# Patient Record
Sex: FEMALE | Race: BLACK OR AFRICAN AMERICAN | NOT HISPANIC OR LATINO | Employment: OTHER | URBAN - METROPOLITAN AREA
[De-identification: names, ages, dates, MRNs, and addresses within clinical notes are randomized per-mention and may not be internally consistent; named-entity substitution may affect disease eponyms.]

---

## 2017-10-10 ENCOUNTER — HOSPITAL ENCOUNTER (OUTPATIENT)
Dept: RADIOLOGY | Facility: OTHER | Age: 57
Discharge: HOME | End: 2017-10-10
Attending: INTERNAL MEDICINE
Payer: MEDICARE

## 2017-10-10 DIAGNOSIS — R10.13 ABDOMINAL PAIN, EPIGASTRIC: ICD-10-CM

## 2017-10-10 PROCEDURE — 76700 US EXAM ABDOM COMPLETE: CPT | Mod: 26,,, | Performed by: RADIOLOGY

## 2017-10-10 PROCEDURE — 76700 US EXAM ABDOM COMPLETE: CPT | Mod: TC

## 2017-11-28 DIAGNOSIS — R10.13 ABDOMINAL PAIN, EPIGASTRIC: Primary | ICD-10-CM

## 2018-01-08 ENCOUNTER — HOSPITAL ENCOUNTER (OUTPATIENT)
Dept: RADIOLOGY | Facility: OTHER | Age: 58
Discharge: HOME | End: 2018-01-08
Attending: INTERNAL MEDICINE
Payer: MEDICARE

## 2018-01-08 DIAGNOSIS — R10.13 ABDOMINAL PAIN, EPIGASTRIC: ICD-10-CM

## 2018-01-08 PROCEDURE — 74177 CT ABD & PELVIS W/CONTRAST: CPT | Mod: TC

## 2018-01-08 PROCEDURE — 74177 CT ABD & PELVIS W/CONTRAST: CPT | Mod: 26,,, | Performed by: RADIOLOGY

## 2018-01-08 PROCEDURE — 25500020 PHARM REV CODE 255: Performed by: INTERNAL MEDICINE

## 2018-01-08 RX ADMIN — IOHEXOL 75 ML: 350 INJECTION, SOLUTION INTRAVENOUS at 04:01

## 2018-01-08 RX ADMIN — IOHEXOL 25 ML: 350 INJECTION, SOLUTION INTRAVENOUS at 03:01

## 2018-12-31 ENCOUNTER — TELEPHONE (OUTPATIENT)
Dept: GASTROENTEROLOGY | Facility: CLINIC | Age: 58
End: 2018-12-31

## 2018-12-31 NOTE — TELEPHONE ENCOUNTER
----- Message from Rose Abid sent at 12/31/2018  2:10 PM CST -----  Contact: self [phone]  Patient Requesting  Appointment.     Reason for appt.: stomach issues/ loss of appepite; Pt asked specifically for Dr. Cobb  When is the first available appointment? Unable to access  Communication Preference: self [phone]  Additional Information:

## 2018-12-31 NOTE — TELEPHONE ENCOUNTER
Spoke with patient.  Scheduled to see Dr.Sean Cobb on 4/12 for 2:30.  Will mail confirmation.  Aware she is also on 's cancellation list.

## 2020-03-03 ENCOUNTER — TELEPHONE (OUTPATIENT)
Dept: GASTROENTEROLOGY | Facility: CLINIC | Age: 60
End: 2020-03-03

## 2020-03-03 ENCOUNTER — TELEPHONE (OUTPATIENT)
Dept: ENDOSCOPY | Facility: HOSPITAL | Age: 60
End: 2020-03-03

## 2020-03-03 ENCOUNTER — HOSPITAL ENCOUNTER (OUTPATIENT)
Facility: HOSPITAL | Age: 60
Discharge: HOME | End: 2020-03-05
Attending: EMERGENCY MEDICINE | Admitting: INTERNAL MEDICINE
Payer: MEDICARE

## 2020-03-03 DIAGNOSIS — F41.9 ANXIETY: ICD-10-CM

## 2020-03-03 DIAGNOSIS — I49.1 PAC (PREMATURE ATRIAL CONTRACTION): ICD-10-CM

## 2020-03-03 DIAGNOSIS — R63.4 WEIGHT LOSS: ICD-10-CM

## 2020-03-03 DIAGNOSIS — R63.4 ABNORMAL WEIGHT LOSS: Primary | ICD-10-CM

## 2020-03-03 PROBLEM — R63.39 FOOD AVERSION: Status: ACTIVE | Noted: 2020-03-03

## 2020-03-03 PROBLEM — M32.9 SLE (SYSTEMIC LUPUS ERYTHEMATOSUS RELATED SYNDROME): Status: ACTIVE | Noted: 2020-03-03

## 2020-03-03 LAB
ALBUMIN SERPL BCP-MCNC: 3.9 G/DL (ref 3.5–5.2)
ALP SERPL-CCNC: 62 U/L (ref 55–135)
ALT SERPL W/O P-5'-P-CCNC: 12 U/L (ref 10–44)
ANION GAP SERPL CALC-SCNC: 8 MMOL/L (ref 8–16)
AST SERPL-CCNC: 21 U/L (ref 10–40)
BASOPHILS # BLD AUTO: 0.02 K/UL (ref 0–0.2)
BASOPHILS NFR BLD: 0.6 % (ref 0–1.9)
BILIRUB SERPL-MCNC: 0.9 MG/DL (ref 0.1–1)
BILIRUB UR QL STRIP: NEGATIVE
BUN SERPL-MCNC: 5 MG/DL (ref 6–20)
CALCIUM SERPL-MCNC: 9.5 MG/DL (ref 8.7–10.5)
CHLORIDE SERPL-SCNC: 102 MMOL/L (ref 95–110)
CLARITY UR REFRACT.AUTO: CLEAR
CO2 SERPL-SCNC: 29 MMOL/L (ref 23–29)
COLOR UR AUTO: YELLOW
CREAT SERPL-MCNC: 0.8 MG/DL (ref 0.5–1.4)
DIFFERENTIAL METHOD: ABNORMAL
EOSINOPHIL # BLD AUTO: 0 K/UL (ref 0–0.5)
EOSINOPHIL NFR BLD: 1.2 % (ref 0–8)
ERYTHROCYTE [DISTWIDTH] IN BLOOD BY AUTOMATED COUNT: 12.4 % (ref 11.5–14.5)
EST. GFR  (AFRICAN AMERICAN): >60 ML/MIN/1.73 M^2
EST. GFR  (NON AFRICAN AMERICAN): >60 ML/MIN/1.73 M^2
GLUCOSE SERPL-MCNC: 93 MG/DL (ref 70–110)
GLUCOSE UR QL STRIP: NEGATIVE
HCT VFR BLD AUTO: 39.1 % (ref 37–48.5)
HGB BLD-MCNC: 12.2 G/DL (ref 12–16)
HGB UR QL STRIP: NEGATIVE
IMM GRANULOCYTES # BLD AUTO: 0 K/UL (ref 0–0.04)
IMM GRANULOCYTES NFR BLD AUTO: 0 % (ref 0–0.5)
KETONES UR QL STRIP: NEGATIVE
LEUKOCYTE ESTERASE UR QL STRIP: NEGATIVE
LIPASE SERPL-CCNC: 9 U/L (ref 4–60)
LYMPHOCYTES # BLD AUTO: 1.4 K/UL (ref 1–4.8)
LYMPHOCYTES NFR BLD: 43 % (ref 18–48)
MCH RBC QN AUTO: 29.8 PG (ref 27–31)
MCHC RBC AUTO-ENTMCNC: 31.2 G/DL (ref 32–36)
MCV RBC AUTO: 95 FL (ref 82–98)
MONOCYTES # BLD AUTO: 0.3 K/UL (ref 0.3–1)
MONOCYTES NFR BLD: 8.4 % (ref 4–15)
NEUTROPHILS # BLD AUTO: 1.5 K/UL (ref 1.8–7.7)
NEUTROPHILS NFR BLD: 46.8 % (ref 38–73)
NITRITE UR QL STRIP: NEGATIVE
NRBC BLD-RTO: 0 /100 WBC
PH UR STRIP: 6 [PH] (ref 5–8)
PLATELET # BLD AUTO: 303 K/UL (ref 150–350)
PMV BLD AUTO: 10.3 FL (ref 9.2–12.9)
POTASSIUM SERPL-SCNC: 3.7 MMOL/L (ref 3.5–5.1)
PROT SERPL-MCNC: 7.2 G/DL (ref 6–8.4)
PROT UR QL STRIP: NEGATIVE
RBC # BLD AUTO: 4.1 M/UL (ref 4–5.4)
SODIUM SERPL-SCNC: 139 MMOL/L (ref 136–145)
SP GR UR STRIP: 1.01 (ref 1–1.03)
URN SPEC COLLECT METH UR: NORMAL
WBC # BLD AUTO: 3.23 K/UL (ref 3.9–12.7)

## 2020-03-03 PROCEDURE — 83690 ASSAY OF LIPASE: CPT

## 2020-03-03 PROCEDURE — 93010 ELECTROCARDIOGRAM REPORT: CPT | Mod: ,,, | Performed by: INTERNAL MEDICINE

## 2020-03-03 PROCEDURE — 96361 HYDRATE IV INFUSION ADD-ON: CPT

## 2020-03-03 PROCEDURE — 94761 N-INVAS EAR/PLS OXIMETRY MLT: CPT

## 2020-03-03 PROCEDURE — 93005 ELECTROCARDIOGRAM TRACING: CPT

## 2020-03-03 PROCEDURE — 93010 EKG 12-LEAD: ICD-10-PCS | Mod: ,,, | Performed by: INTERNAL MEDICINE

## 2020-03-03 PROCEDURE — 96374 THER/PROPH/DIAG INJ IV PUSH: CPT

## 2020-03-03 PROCEDURE — 99285 PR EMERGENCY DEPT VISIT,LEVEL V: ICD-10-PCS | Mod: ,,, | Performed by: EMERGENCY MEDICINE

## 2020-03-03 PROCEDURE — 63600175 PHARM REV CODE 636 W HCPCS: Performed by: EMERGENCY MEDICINE

## 2020-03-03 PROCEDURE — 25500020 PHARM REV CODE 255: Performed by: EMERGENCY MEDICINE

## 2020-03-03 PROCEDURE — 63600175 PHARM REV CODE 636 W HCPCS: Performed by: HOSPITALIST

## 2020-03-03 PROCEDURE — 99285 EMERGENCY DEPT VISIT HI MDM: CPT | Mod: ,,, | Performed by: EMERGENCY MEDICINE

## 2020-03-03 PROCEDURE — 96376 TX/PRO/DX INJ SAME DRUG ADON: CPT

## 2020-03-03 PROCEDURE — 25000003 PHARM REV CODE 250: Performed by: HOSPITALIST

## 2020-03-03 PROCEDURE — G0378 HOSPITAL OBSERVATION PER HR: HCPCS

## 2020-03-03 PROCEDURE — 99220 PR INITIAL OBSERVATION CARE,LEVL III: CPT | Mod: ,,, | Performed by: HOSPITALIST

## 2020-03-03 PROCEDURE — 85025 COMPLETE CBC W/AUTO DIFF WBC: CPT

## 2020-03-03 PROCEDURE — 80053 COMPREHEN METABOLIC PANEL: CPT

## 2020-03-03 PROCEDURE — 81003 URINALYSIS AUTO W/O SCOPE: CPT

## 2020-03-03 PROCEDURE — 99220 PR INITIAL OBSERVATION CARE,LEVL III: ICD-10-PCS | Mod: ,,, | Performed by: HOSPITALIST

## 2020-03-03 PROCEDURE — 99285 EMERGENCY DEPT VISIT HI MDM: CPT | Mod: 25

## 2020-03-03 RX ORDER — PANTOPRAZOLE SODIUM 40 MG/1
40 TABLET, DELAYED RELEASE ORAL DAILY
Status: DISCONTINUED | OUTPATIENT
Start: 2020-03-04 | End: 2020-03-03

## 2020-03-03 RX ORDER — IBUPROFEN 200 MG
24 TABLET ORAL
Status: DISCONTINUED | OUTPATIENT
Start: 2020-03-03 | End: 2020-03-05 | Stop reason: HOSPADM

## 2020-03-03 RX ORDER — SOTALOL HYDROCHLORIDE 80 MG/1
80 TABLET ORAL 2 TIMES DAILY
COMMUNITY

## 2020-03-03 RX ORDER — ACETAMINOPHEN 325 MG/1
650 TABLET ORAL EVERY 4 HOURS PRN
Status: DISCONTINUED | OUTPATIENT
Start: 2020-03-03 | End: 2020-03-05 | Stop reason: HOSPADM

## 2020-03-03 RX ORDER — ONDANSETRON 2 MG/ML
4 INJECTION INTRAMUSCULAR; INTRAVENOUS
Status: COMPLETED | OUTPATIENT
Start: 2020-03-03 | End: 2020-03-03

## 2020-03-03 RX ORDER — PROCHLORPERAZINE EDISYLATE 5 MG/ML
5 INJECTION INTRAMUSCULAR; INTRAVENOUS EVERY 6 HOURS PRN
Status: DISCONTINUED | OUTPATIENT
Start: 2020-03-03 | End: 2020-03-05 | Stop reason: HOSPADM

## 2020-03-03 RX ORDER — DICYCLOMINE HYDROCHLORIDE 20 MG/1
20 TABLET ORAL 2 TIMES DAILY WITH MEALS
Status: DISCONTINUED | OUTPATIENT
Start: 2020-03-04 | End: 2020-03-04

## 2020-03-03 RX ORDER — SODIUM CHLORIDE 0.9 % (FLUSH) 0.9 %
10 SYRINGE (ML) INJECTION
Status: DISCONTINUED | OUTPATIENT
Start: 2020-03-03 | End: 2020-03-05 | Stop reason: HOSPADM

## 2020-03-03 RX ORDER — TALC
6 POWDER (GRAM) TOPICAL NIGHTLY PRN
Status: DISCONTINUED | OUTPATIENT
Start: 2020-03-03 | End: 2020-03-03

## 2020-03-03 RX ORDER — ZOLPIDEM TARTRATE 10 MG/1
10 TABLET ORAL NIGHTLY PRN
COMMUNITY

## 2020-03-03 RX ORDER — IBUPROFEN 200 MG
16 TABLET ORAL
Status: DISCONTINUED | OUTPATIENT
Start: 2020-03-03 | End: 2020-03-05 | Stop reason: HOSPADM

## 2020-03-03 RX ORDER — METHOCARBAMOL 500 MG/1
500 TABLET, FILM COATED ORAL 3 TIMES DAILY PRN
COMMUNITY

## 2020-03-03 RX ORDER — HYDROXYCHLOROQUINE SULFATE 200 MG/1
200 TABLET, FILM COATED ORAL DAILY
COMMUNITY

## 2020-03-03 RX ORDER — PANTOPRAZOLE SODIUM 20 MG/1
20 TABLET, DELAYED RELEASE ORAL
COMMUNITY

## 2020-03-03 RX ORDER — LORAZEPAM 1 MG/1
1 TABLET ORAL 2 TIMES DAILY
COMMUNITY

## 2020-03-03 RX ORDER — PANTOPRAZOLE SODIUM 40 MG/1
40 TABLET, DELAYED RELEASE ORAL 2 TIMES DAILY
Status: DISCONTINUED | OUTPATIENT
Start: 2020-03-03 | End: 2020-03-05 | Stop reason: HOSPADM

## 2020-03-03 RX ORDER — ONDANSETRON 4 MG/1
4 TABLET, ORALLY DISINTEGRATING ORAL EVERY 8 HOURS PRN
COMMUNITY
End: 2020-07-30

## 2020-03-03 RX ORDER — FAMOTIDINE 20 MG/1
20 TABLET, FILM COATED ORAL DAILY
COMMUNITY
End: 2020-07-20

## 2020-03-03 RX ORDER — SODIUM CHLORIDE 9 MG/ML
1000 INJECTION, SOLUTION INTRAVENOUS
Status: COMPLETED | OUTPATIENT
Start: 2020-03-03 | End: 2020-03-03

## 2020-03-03 RX ORDER — SODIUM CHLORIDE 9 MG/ML
INJECTION, SOLUTION INTRAVENOUS CONTINUOUS
Status: ACTIVE | OUTPATIENT
Start: 2020-03-03 | End: 2020-03-04

## 2020-03-03 RX ORDER — IPRATROPIUM BROMIDE AND ALBUTEROL SULFATE 2.5; .5 MG/3ML; MG/3ML
3 SOLUTION RESPIRATORY (INHALATION) EVERY 6 HOURS PRN
Status: DISCONTINUED | OUTPATIENT
Start: 2020-03-03 | End: 2020-03-05 | Stop reason: HOSPADM

## 2020-03-03 RX ORDER — SOTALOL HYDROCHLORIDE 80 MG/1
80 TABLET ORAL 2 TIMES DAILY
Status: DISCONTINUED | OUTPATIENT
Start: 2020-03-03 | End: 2020-03-05 | Stop reason: HOSPADM

## 2020-03-03 RX ORDER — FAMOTIDINE 20 MG/1
20 TABLET, FILM COATED ORAL DAILY
Status: DISCONTINUED | OUTPATIENT
Start: 2020-03-04 | End: 2020-03-05 | Stop reason: HOSPADM

## 2020-03-03 RX ORDER — ZOLPIDEM TARTRATE 5 MG/1
5 TABLET ORAL NIGHTLY PRN
Status: DISCONTINUED | OUTPATIENT
Start: 2020-03-03 | End: 2020-03-05 | Stop reason: HOSPADM

## 2020-03-03 RX ORDER — LORAZEPAM 1 MG/1
1 TABLET ORAL EVERY 12 HOURS PRN
Status: DISCONTINUED | OUTPATIENT
Start: 2020-03-03 | End: 2020-03-05 | Stop reason: HOSPADM

## 2020-03-03 RX ORDER — DICYCLOMINE HYDROCHLORIDE 20 MG/1
20 TABLET ORAL 4 TIMES DAILY
COMMUNITY
End: 2020-07-20 | Stop reason: SDUPTHER

## 2020-03-03 RX ORDER — HYDROXYCHLOROQUINE SULFATE 200 MG/1
200 TABLET, FILM COATED ORAL 2 TIMES DAILY
Status: DISCONTINUED | OUTPATIENT
Start: 2020-03-03 | End: 2020-03-05 | Stop reason: HOSPADM

## 2020-03-03 RX ORDER — ONDANSETRON 2 MG/ML
4 INJECTION INTRAMUSCULAR; INTRAVENOUS EVERY 8 HOURS PRN
Status: DISCONTINUED | OUTPATIENT
Start: 2020-03-03 | End: 2020-03-05 | Stop reason: HOSPADM

## 2020-03-03 RX ADMIN — SODIUM CHLORIDE: 0.9 INJECTION, SOLUTION INTRAVENOUS at 10:03

## 2020-03-03 RX ADMIN — ZOLPIDEM TARTRATE 5 MG: 5 TABLET ORAL at 10:03

## 2020-03-03 RX ADMIN — IOHEXOL 75 ML: 350 INJECTION, SOLUTION INTRAVENOUS at 05:03

## 2020-03-03 RX ADMIN — SODIUM CHLORIDE 1000 ML: 0.9 INJECTION, SOLUTION INTRAVENOUS at 02:03

## 2020-03-03 RX ADMIN — HYDROXYCHLOROQUINE SULFATE 200 MG: 200 TABLET, FILM COATED ORAL at 10:03

## 2020-03-03 RX ADMIN — ONDANSETRON 4 MG: 2 INJECTION INTRAMUSCULAR; INTRAVENOUS at 04:03

## 2020-03-03 RX ADMIN — SODIUM CHLORIDE 1000 ML: 0.9 INJECTION, SOLUTION INTRAVENOUS at 04:03

## 2020-03-03 RX ADMIN — SOTALOL HYDROCHLORIDE 80 MG: 80 TABLET ORAL at 10:03

## 2020-03-03 RX ADMIN — ONDANSETRON 4 MG: 2 INJECTION INTRAMUSCULAR; INTRAVENOUS at 02:03

## 2020-03-03 RX ADMIN — PANTOPRAZOLE SODIUM 40 MG: 40 TABLET, DELAYED RELEASE ORAL at 10:03

## 2020-03-03 NOTE — H&P
"Hospital Medicine  History and Physical Exam    Team: [facility] F Derick Lee MD  Admit Date: 3/3/2020  Principal Problem:  Abnormal weight loss   Patient information was obtained from patient, past medical records and ER records.   Primary care Physician: Sebastien Yousif Jr, MD  Code status: Full Code    HPI: 60 yo F with PMHx recently diagnosed lupus and SVT presents to the ED complaining of inability to tolerate PO intake. The patient reports that the issue is chronic, and she has been evaluated by GI at [facility]. Within the last couple of days, she reports that her symptoms have gotten worse. She reports that everything she tries to take by mouth, both solids and liquids causes her stomach discomfort. She has no appetite and has to even force herself to drink sprite with her medications. She also reports that food over the last couple of days seems to have an awful taste. She has had only one episode of actual vomiting yesterday which was a small amount of clear liquid. She denies any acute diarrhea, but she endorses diarrhea that keeps in the bathroom for several hours each night. She says that around 1-2am every night like clockwork she has stomach cramps and needs to get out of bed to use the toilet, and she ends up being there for at least an hour because of diarrhea. The diarrhea seems to be worse the more food she eats. She states that everything she eats seems to come out of one end or the other within a few hours. Her diarrhea is not acutely worse. She reports that her last BM was last night around 1 am. Today, she started to feel lightheaded and suspected it was 2/2 to dehydration.    Per the patient's PCP documentation in the care everywhere tab, "Upper GI done recently with GI at [facility] with no significant finding. Previous upper GI showed ulcers which appear to have healed. She continues to follow up with them for abnormal weight loss evaluation. Also seeing rheumatology who has recently " "diagnosed patient with Lupus (~2 months ago per patient) for which she is taking Plaquenil." She states she was referred here to Dr. Cobb for GI from [facility] for further work-up of her symptoms. She has an appointment scheduled for 4/28. She called Dr. Cobb's today because of the acute worsening of her symptoms in an attempt to be seen earlier. She was instructed to go to the ED for further evaluation and possible need for IV fluids as no appointments were available for today.      No results found for: HGBA1C    Past Medical History: Patient has a past medical history of Anxiety, Arthritis, GERD (gastroesophageal reflux disease), and Neuromuscular disorder.    Past Surgical History: Patient has a past surgical history that includes left breast surgery in 1989 (Left).    Social History: Patient reports that she has never smoked. She does not have any smokeless tobacco history on file. She reports that she does not drink alcohol or use drugs.    Family History: family history includes Cancer in her mother; Heart disease in her father.    Medications: reviewed     Allergies: Patient has No Known Allergies.    ROS  Pain Scale: 0 /10   Constitutional: Positive for weakness, lack of appetite, and weight loss  Respiratory: no cough or shortness of breath  Cardiovascular: no chest pain or palpitations, Positive for lightheadedness  Gastrointestinal: Positive for N/V, and diarrhea, no abdominal pain or change in bowel habits  Genitourinary: no hematuria or dysuria  Integument/Breast: no rash or pruritis  Hematologic/Lymphatic: no easy bruising or lymphadenopathy  Musculoskeletal: no arthralgias or myalgias  Neurological: no seizures or tremors  Behavioral/Psych: no depression or anxiety    PEx  Temp:  [98.1 °F (36.7 °C)-98.5 °F (36.9 °C)]   Pulse:  [74-77]   Resp:  [15-16]   BP: (118-121)/(63-69)   SpO2:  [99 %-100 %]   Body mass index is 16.46 kg/m².     Intake/Output Summary (Last 24 hours) at 3/3/2020 " 1707  Last data filed at 3/3/2020 1534  Gross per 24 hour   Intake 1000 ml   Output --   Net 1000 ml     General appearance: no distress, pt. Resting comfortably  Mental status: Alert and oriented x 3  HEENT:  conjunctivae/corneas clear, PERRL  Neck: supple, thyroid not enlarged  Pulm:   normal respiratory effort, CTA B, no c/w/r  Card: RRR, S1, S2 normal, no murmur, click, rub or gallop  Abd: soft, NT, ND, BS present; no masses, no organomegaly  Ext: no c/c/e  Pulses: 2+, symmetric  Skin: color, texture, turgor normal. No rashes or lesions  Neuro: CN II-XII grossly intact, no focal numbness or weakness, normal strength and tone     Recent Results (from the past 24 hour(s))   CBC W/ AUTO DIFFERENTIAL    Collection Time: 03/03/20  2:14 PM   Result Value Ref Range    WBC 3.23 (L) 3.90 - 12.70 K/uL    RBC 4.10 4.00 - 5.40 M/uL    Hemoglobin 12.2 12.0 - 16.0 g/dL    Hematocrit 39.1 37.0 - 48.5 %    Mean Corpuscular Volume 95 82 - 98 fL    Mean Corpuscular Hemoglobin 29.8 27.0 - 31.0 pg    Mean Corpuscular Hemoglobin Conc 31.2 (L) 32.0 - 36.0 g/dL    RDW 12.4 11.5 - 14.5 %    Platelets 303 150 - 350 K/uL    MPV 10.3 9.2 - 12.9 fL    Immature Granulocytes 0.0 0.0 - 0.5 %    Gran # (ANC) 1.5 (L) 1.8 - 7.7 K/uL    Immature Grans (Abs) 0.00 0.00 - 0.04 K/uL    Lymph # 1.4 1.0 - 4.8 K/uL    Mono # 0.3 0.3 - 1.0 K/uL    Eos # 0.0 0.0 - 0.5 K/uL    Baso # 0.02 0.00 - 0.20 K/uL    nRBC 0 0 /100 WBC    Gran% 46.8 38.0 - 73.0 %    Lymph% 43.0 18.0 - 48.0 %    Mono% 8.4 4.0 - 15.0 %    Eosinophil% 1.2 0.0 - 8.0 %    Basophil% 0.6 0.0 - 1.9 %    Differential Method Automated    Comp. Metabolic Panel    Collection Time: 03/03/20  2:14 PM   Result Value Ref Range    Sodium 139 136 - 145 mmol/L    Potassium 3.7 3.5 - 5.1 mmol/L    Chloride 102 95 - 110 mmol/L    CO2 29 23 - 29 mmol/L    Glucose 93 70 - 110 mg/dL    BUN, Bld 5 (L) 6 - 20 mg/dL    Creatinine 0.8 0.5 - 1.4 mg/dL    Calcium 9.5 8.7 - 10.5 mg/dL    Total Protein 7.2 6.0 -  8.4 g/dL    Albumin 3.9 3.5 - 5.2 g/dL    Total Bilirubin 0.9 0.1 - 1.0 mg/dL    Alkaline Phosphatase 62 55 - 135 U/L    AST 21 10 - 40 U/L    ALT 12 10 - 44 U/L    Anion Gap 8 8 - 16 mmol/L    eGFR if African American >60.0 >60 mL/min/1.73 m^2    eGFR if non African American >60.0 >60 mL/min/1.73 m^2   Lipase    Collection Time: 03/03/20  2:14 PM   Result Value Ref Range    Lipase 9 4 - 60 U/L   Urinalysis, Reflex to Urine Culture Urine, Clean Catch    Collection Time: 03/03/20  2:37 PM   Result Value Ref Range    Specimen UA Urine, Clean Catch     Color, UA Yellow Yellow, Straw, Kaitlynn    Appearance, UA Clear Clear    pH, UA 6.0 5.0 - 8.0    Specific Gravity, UA 1.015 1.005 - 1.030    Protein, UA Negative Negative    Glucose, UA Negative Negative    Ketones, UA Negative Negative    Bilirubin (UA) Negative Negative    Occult Blood UA Negative Negative    Nitrite, UA Negative Negative    Leukocytes, UA Negative Negative       No results for input(s): POCTGLUCOSE in the last 168 hours.    Active Hospital Problems    Diagnosis  POA    Weight loss [R63.4]  Yes      Resolved Hospital Problems   No resolved problems to display.     Assessment and Plan:  Abnormal Weight Loss  Food Aversion  -Pt. Reports no appetite, not eating due to non-speicific GI symptoms. Also reports diarrhea or nausea/vomiting after she eats. Only reporting a couple of BMs per day and vomiting less than once daily  -Electrolytes and Albumin WNL, no evidence of malnutirtion  -Seen by [facility] without clear diagnosis. EGD recently performed. Was referred to GI here and has appointment 4/28  -Also reporting 30 lbs weight loss over last year, but weights documented in PCP notes show a much more chronic loss of weight  -Symptoms could possibly be manifestation of lupus, weight loss often occurs prior to the diagnosis of SLE    Wt over last several years as reported in care everywhere:  2014: 126lbs  2015: 118lbs  2016: 115lbs  2017 111 lbs  2018:  103lbs  2019: 98 lbs  2020: 90 lbs     -GI consulted, to see patient in am. Monitor in observation and treat acute symptoms of nausea symptomatically.  -Continue bentyl and PPI    SLE  -Pt.reports being diagnosed ~2 months ago after seeing derm for rash/hair loss and being referred to rheumatologist for further serum testing. Unclear if current symptoms are related  -Continue current regimen of Plaquenil, continue to f/u with rheumatology outpatient    Hx of PUD  -Per patient, diagnosed on EGD in October 2019, more recent EGDs have shown healing  -Continue PPI BID    Hx of Atrial Tachycardia  -EKG ordered, but rate normal on exam  -Continue home med sotalol    Generalized Anxiety Disorder  -Continue home med ativan PO PRN BID    DVT PPx: Lovenox    Derick Lee MD  Hospital Medicine Staff  [phone] pager

## 2020-03-03 NOTE — ED PROVIDER NOTES
"Encounter Date: 3/3/2020    SCRIBE #1 NOTE: I, Maylin Winter, am scribing for, and in the presence of,  Shonna Camarillo MD. I have scribed the following portions of the note - Other sections scribed: HPI ROS PE MDM.       History     Chief Complaint   Patient presents with    Emesis     "been going on for several months"; states she has already seen GI    Abdominal Pain     Time patient was seen by the provider: 2:07 PM      The patient is a 59 y.o. female with co-morbidities including: Lupus, GERD, who presents to the ED with a complaint of nausea and vomiting. Patient reports this has been ongoing for months and has seen here PCP for this. Patient reports yesterday she tried to eat soup and crackers but "it does not taste good". Her last episode of emesis was yesterday. Patient also reports of trying to drink ensure, smoothies, and eating spinach but after 30 minutes, she would have an episode of diarrhea. Patient reports worsening of palpitations with associated chest discomfort. She reports episode last 10 minutes in duration. She reports palpitations normally occurs in the morning. Patient reports she loss approximately 30 lbs within the last year. Endorses light headedness and decrease in appetite. Patient reports over the last couple of days she notes chills, diaphoresis, sneezing and rhinorrhea. Denies cough or fever. She reports she has been more forgetful recently.     Pt states these symptoms have been ongoing for months but she came in today because she feels as though she hasn't been able to eat anything.    The history is provided by the patient.     Review of patient's allergies indicates:  No Known Allergies  Past Medical History:   Diagnosis Date    Anxiety     Arthritis     GERD (gastroesophageal reflux disease)     Neuromuscular disorder      Past Surgical History:   Procedure Laterality Date    left breast surgery in 1989 Left      Family History   Problem Relation Age of Onset    " Cancer Mother     Heart disease Father      Social History     Tobacco Use    Smoking status: Never Smoker   Substance Use Topics    Alcohol use: No    Drug use: No     Review of Systems   Constitutional: Positive for appetite change, chills, diaphoresis and unexpected weight change. Negative for fever.   HENT: Positive for rhinorrhea and sneezing. Negative for sore throat.    Respiratory: Negative for shortness of breath.    Cardiovascular: Negative for chest pain.   Gastrointestinal: Positive for abdominal pain, diarrhea, nausea and vomiting.   Genitourinary: Negative for dysuria.   Musculoskeletal: Negative for back pain.   Skin: Negative for rash.   Neurological: Positive for light-headedness. Negative for weakness.   Hematological: Does not bruise/bleed easily.       Physical Exam     Initial Vitals [03/03/20 1340]   BP Pulse Resp Temp SpO2   118/63 77 16 98.5 °F (36.9 °C) 99 %      MAP       --         Physical Exam    Constitutional: She appears well-developed and well-nourished. No distress.   Thin appearing   HENT:   Head: Normocephalic and atraumatic.   Right Ear: External ear normal.   Left Ear: External ear normal.   Mouth/Throat: Oropharynx is clear and moist.   Eyes: EOM are normal. Pupils are equal, round, and reactive to light.   Neck: Normal range of motion. Neck supple.   Cardiovascular: Normal rate, regular rhythm, normal heart sounds and intact distal pulses.   Pulmonary/Chest: Breath sounds normal.   Abdominal: Soft. She exhibits no distension. There is tenderness.   Diffusely tender   Musculoskeletal: Normal range of motion.   Neurological: She is alert and oriented to person, place, and time. She has normal strength.   Skin: Skin is warm and dry.   Psychiatric: Her behavior is normal. Thought content normal.         ED Course   Procedures  Labs Reviewed   CBC W/ AUTO DIFFERENTIAL - Abnormal; Notable for the following components:       Result Value    WBC 3.23 (*)     Mean Corpuscular  Hemoglobin Conc 31.2 (*)     Gran # (ANC) 1.5 (*)     All other components within normal limits   COMPREHENSIVE METABOLIC PANEL - Abnormal; Notable for the following components:    BUN, Bld 5 (*)     All other components within normal limits   LIPASE   URINALYSIS, REFLEX TO URINE CULTURE    Narrative:     Preferred Collection Type->Urine, Clean Catch        ECG Results    None       Imaging Results          CT Abdomen Pelvis With Contrast (Final result)  Result time 03/03/20 18:07:00    Final result by Delmer Vanessa MD (03/03/20 18:07:00)                 Impression:      1. Mild prominence of the bilateral renal collecting systems noting distention of the urinary bladder.  Correlation with any history of urinary outlet obstruction or urinary retention recommended.  2. Moderate to large amount of stool within the colon, correlation with any clinical findings of constipation.  3. Findings suggesting hepatic steatosis, correlation with LFTs advised.  4. Additional findings above.      Electronically signed by: Delmer Vanessa MD  Date:    03/03/2020  Time:    18:07             Narrative:    EXAMINATION:  CT ABDOMEN PELVIS WITH CONTRAST    CLINICAL HISTORY:  Nausea, vomiting, diarrhea;    TECHNIQUE:  Low dose axial images, sagittal and coronal reformations were obtained from the lung bases to the pubic symphysis following the IV administration of 75 mL of Omnipaque 350 .  Oral contrast was not given.    COMPARISON:  01/08/2018    FINDINGS:  Images of the lower thorax are remarkable for minimal dependent atelectasis.    The liver is hypoattenuating, suggesting steatosis, correlation with LFTs recommended.  The spleen, pancreas, and adrenal glands are unremarkable.  The gallbladder is distended without significant biliary dilation.  No ascites.  The pancreatic duct is not dilated.  The stomach is decompressed.  The portal vein, splenic vein, proximal SMV, celiac axis and SMA all are grossly patent allowing for  contrast phase.  No significant abdominal lymphadenopathy noting several scattered shotty periaortic and paracaval lymph nodes.    The kidneys enhance symmetrically and excrete contrast appropriately without nephrolithiasis.  There is mild prominence of the bilateral renal collecting systems.  The bilateral ureters are unable to be followed in their entirety to the urinary bladder noting the ureters are mildly prominent.  The urinary bladder is distended, without wall thickening.  This may account for mild ureteral prominence although correlation with urinalysis is recommended.  The uterus and adnexa are unremarkable.    There is a large amount of stool within the colon noting a few scattered colonic diverticula without inflammation.  The terminal ileum is unremarkable.  The appendix is unremarkable.  The small bowel is unremarkable.  No focal organized pelvic fluid collection.    There is osteopenia.  Degenerative changes are noted of the spine.  No significant inguinal lymphadenopathy.                                 Medical Decision Making:   History:   Old Medical Records: I decided to obtain old medical records.  Old Records Summarized: records from clinic visits.       <> Summary of Records: Annual visit with her primary care physician at [facility] on 2/21. She has a history of Lupus and is on Plaquenil. She sees cardiology for palpitations. She had upper GI done recently at [facility] that showed no significant findings. Previous upper GI showed ulcers. Telephone note from this afternoon, where she called GI complaining of vomiting, diarrhea, unable to keep anything down.   Initial Assessment:   59 y.o. female with recent diagnosis of Lupus who presents complaining of weight loss, nausea, vomiting, and diarrhea. Reports significant decrease appetite over the past 2 days to the point she does not feel like she is eating at all. On my exam, abdominal exam is benign, will check labs. Will give a liter  of IV fluids and give IV Zofran for nausea.   Clinical Tests:   Lab Tests: Ordered and Reviewed            Scribe Attestation:   Scribe #1: I performed the above scribed service and the documentation accurately describes the services I performed. I attest to the accuracy of the note.    Attending Attestation:           Physician Attestation for Scribe:      Comments: I, Dr. Shonna Camarillo, personally performed the services described in this documentation. All medical record entries made by the scribe were at my direction and in my presence.  I have reviewed the chart and agree that the record reflects my personal performance and is accurate and complete. Shonna Camarillo MD.        Attending ED Notes:   3:57 PM  Labs are unremarkable other than mild decrease white blood cell count and a BUN of 5. Patient reports she is still nauseated. Will give another dose of Zofran and try to contact GI, Dr. Cobb.    Pt does not feel much better. Will place in observation for continued IV fluids, control of her nausea and consultation with GI.                        Clinical Impression:       ICD-10-CM ICD-9-CM   1. Weight loss R63.4 783.21   2. PAC (premature atrial contraction) I49.1 427.61   3. Abnormal weight loss R63.4 783.21             ED Disposition Condition    Observation                           Shonna Camarillo MD  03/04/20 1710

## 2020-03-03 NOTE — TELEPHONE ENCOUNTER
Patient calls in and I spoke with her.  She has no appetite and she is unable to keep anything down. No solids, no fluids. She has had numerous bouts of vomiting and diarrhea; says she weighs 82#.  She is currently taking Bentyl, Zofran, Protonix and pepcid AC @ night.  She states she is very lightheaded and weak and is experiencing heart palpitations.  She stated she feels like she needs to go to the ER for some IV fluids.  Patient is crying on the phone.  Informed her that we had no appointments today and if she felt like she needed to go to the ER because of the dehydration, weakness and heart palpations that she should do so.  She thanked me for listening.  I told her I would let Dr. Cobb know.

## 2020-03-04 PROBLEM — E43 SEVERE MALNUTRITION: Status: ACTIVE | Noted: 2020-03-04

## 2020-03-04 PROBLEM — R10.9 ABDOMINAL PAIN: Status: ACTIVE | Noted: 2020-03-04

## 2020-03-04 PROBLEM — D64.9 ANEMIA: Status: ACTIVE | Noted: 2020-03-04

## 2020-03-04 PROBLEM — R19.7 DIARRHEA: Status: ACTIVE | Noted: 2020-03-04

## 2020-03-04 PROBLEM — K21.9 GERD (GASTROESOPHAGEAL REFLUX DISEASE): Status: ACTIVE | Noted: 2020-03-04

## 2020-03-04 LAB
ANION GAP SERPL CALC-SCNC: 5 MMOL/L (ref 8–16)
BASOPHILS # BLD AUTO: 0.02 K/UL (ref 0–0.2)
BASOPHILS NFR BLD: 0.8 % (ref 0–1.9)
BUN SERPL-MCNC: 7 MG/DL (ref 6–20)
CALCIUM SERPL-MCNC: 7.8 MG/DL (ref 8.7–10.5)
CHLORIDE SERPL-SCNC: 112 MMOL/L (ref 95–110)
CO2 SERPL-SCNC: 24 MMOL/L (ref 23–29)
CREAT SERPL-MCNC: 0.8 MG/DL (ref 0.5–1.4)
CRP SERPL-MCNC: 0.5 MG/L (ref 0–8.2)
DIFFERENTIAL METHOD: ABNORMAL
EOSINOPHIL # BLD AUTO: 0.1 K/UL (ref 0–0.5)
EOSINOPHIL NFR BLD: 1.9 % (ref 0–8)
ERYTHROCYTE [DISTWIDTH] IN BLOOD BY AUTOMATED COUNT: 12.6 % (ref 11.5–14.5)
ERYTHROCYTE [SEDIMENTATION RATE] IN BLOOD BY WESTERGREN METHOD: 8 MM/HR (ref 0–36)
EST. GFR  (AFRICAN AMERICAN): >60 ML/MIN/1.73 M^2
EST. GFR  (NON AFRICAN AMERICAN): >60 ML/MIN/1.73 M^2
GLUCOSE SERPL-MCNC: 87 MG/DL (ref 70–110)
HCT VFR BLD AUTO: 29.2 % (ref 37–48.5)
HGB BLD-MCNC: 9.2 G/DL (ref 12–16)
IMM GRANULOCYTES # BLD AUTO: 0.02 K/UL (ref 0–0.04)
IMM GRANULOCYTES NFR BLD AUTO: 0.8 % (ref 0–0.5)
IRON SERPL-MCNC: 52 UG/DL (ref 30–160)
LYMPHOCYTES # BLD AUTO: 1.4 K/UL (ref 1–4.8)
LYMPHOCYTES NFR BLD: 51.3 % (ref 18–48)
MCH RBC QN AUTO: 29.7 PG (ref 27–31)
MCHC RBC AUTO-ENTMCNC: 31.5 G/DL (ref 32–36)
MCV RBC AUTO: 94 FL (ref 82–98)
MONOCYTES # BLD AUTO: 0.3 K/UL (ref 0.3–1)
MONOCYTES NFR BLD: 9.4 % (ref 4–15)
NEUTROPHILS # BLD AUTO: 1 K/UL (ref 1.8–7.7)
NEUTROPHILS NFR BLD: 35.8 % (ref 38–73)
NRBC BLD-RTO: 0 /100 WBC
PLATELET # BLD AUTO: 209 K/UL (ref 150–350)
PMV BLD AUTO: 10.8 FL (ref 9.2–12.9)
POTASSIUM SERPL-SCNC: 3.7 MMOL/L (ref 3.5–5.1)
RBC # BLD AUTO: 3.1 M/UL (ref 4–5.4)
SATURATED IRON: 18 % (ref 20–50)
SODIUM SERPL-SCNC: 141 MMOL/L (ref 136–145)
T4 FREE SERPL-MCNC: 0.96 NG/DL (ref 0.71–1.51)
TOTAL IRON BINDING CAPACITY: 292 UG/DL (ref 250–450)
TRANSFERRIN SERPL-MCNC: 197 MG/DL (ref 200–375)
TSH SERPL DL<=0.005 MIU/L-ACNC: 1.35 UIU/ML (ref 0.4–4)
WBC # BLD AUTO: 2.65 K/UL (ref 3.9–12.7)

## 2020-03-04 PROCEDURE — 80048 BASIC METABOLIC PNL TOTAL CA: CPT

## 2020-03-04 PROCEDURE — 85652 RBC SED RATE AUTOMATED: CPT

## 2020-03-04 PROCEDURE — 84443 ASSAY THYROID STIM HORMONE: CPT

## 2020-03-04 PROCEDURE — 83540 ASSAY OF IRON: CPT

## 2020-03-04 PROCEDURE — 25000003 PHARM REV CODE 250: Performed by: HOSPITALIST

## 2020-03-04 PROCEDURE — 86677 HELICOBACTER PYLORI ANTIBODY: CPT

## 2020-03-04 PROCEDURE — 97802 MEDICAL NUTRITION INDIV IN: CPT

## 2020-03-04 PROCEDURE — 84439 ASSAY OF FREE THYROXINE: CPT

## 2020-03-04 PROCEDURE — 85025 COMPLETE CBC W/AUTO DIFF WBC: CPT

## 2020-03-04 PROCEDURE — 99226 PR SUBSEQUENT OBSERVATION CARE,LEVEL III: ICD-10-PCS | Mod: ,,, | Performed by: PHYSICIAN ASSISTANT

## 2020-03-04 PROCEDURE — 25000003 PHARM REV CODE 250: Performed by: PHYSICIAN ASSISTANT

## 2020-03-04 PROCEDURE — 86140 C-REACTIVE PROTEIN: CPT

## 2020-03-04 PROCEDURE — G0378 HOSPITAL OBSERVATION PER HR: HCPCS

## 2020-03-04 PROCEDURE — 83516 IMMUNOASSAY NONANTIBODY: CPT | Mod: 59

## 2020-03-04 PROCEDURE — 94761 N-INVAS EAR/PLS OXIMETRY MLT: CPT

## 2020-03-04 PROCEDURE — 99204 PR OFFICE/OUTPT VISIT, NEW, LEVL IV, 45-59 MIN: ICD-10-PCS | Mod: ,,, | Performed by: INTERNAL MEDICINE

## 2020-03-04 PROCEDURE — 99226 PR SUBSEQUENT OBSERVATION CARE,LEVEL III: CPT | Mod: ,,, | Performed by: PHYSICIAN ASSISTANT

## 2020-03-04 PROCEDURE — 36415 COLL VENOUS BLD VENIPUNCTURE: CPT

## 2020-03-04 PROCEDURE — 99204 OFFICE O/P NEW MOD 45 MIN: CPT | Mod: ,,, | Performed by: INTERNAL MEDICINE

## 2020-03-04 PROCEDURE — 63600175 PHARM REV CODE 636 W HCPCS: Performed by: HOSPITALIST

## 2020-03-04 PROCEDURE — 96376 TX/PRO/DX INJ SAME DRUG ADON: CPT

## 2020-03-04 RX ORDER — LOPERAMIDE HYDROCHLORIDE 2 MG/1
2 CAPSULE ORAL 4 TIMES DAILY PRN
Status: DISCONTINUED | OUTPATIENT
Start: 2020-03-04 | End: 2020-03-05 | Stop reason: HOSPADM

## 2020-03-04 RX ORDER — DICYCLOMINE HYDROCHLORIDE 20 MG/1
20 TABLET ORAL
Status: DISCONTINUED | OUTPATIENT
Start: 2020-03-04 | End: 2020-03-04

## 2020-03-04 RX ORDER — DICYCLOMINE HYDROCHLORIDE 20 MG/1
20 TABLET ORAL
Status: DISPENSED | OUTPATIENT
Start: 2020-03-04 | End: 2020-03-05

## 2020-03-04 RX ADMIN — DICYCLOMINE HYDROCHLORIDE 20 MG: 20 TABLET ORAL at 09:03

## 2020-03-04 RX ADMIN — DICYCLOMINE HYDROCHLORIDE 20 MG: 20 TABLET ORAL at 10:03

## 2020-03-04 RX ADMIN — HYDROXYCHLOROQUINE SULFATE 200 MG: 200 TABLET, FILM COATED ORAL at 09:03

## 2020-03-04 RX ADMIN — ONDANSETRON 4 MG: 2 INJECTION INTRAMUSCULAR; INTRAVENOUS at 07:03

## 2020-03-04 RX ADMIN — ACETAMINOPHEN 650 MG: 325 TABLET ORAL at 02:03

## 2020-03-04 RX ADMIN — LOPERAMIDE HYDROCHLORIDE 2 MG: 2 CAPSULE ORAL at 01:03

## 2020-03-04 RX ADMIN — SOTALOL HYDROCHLORIDE 80 MG: 80 TABLET ORAL at 09:03

## 2020-03-04 RX ADMIN — PANTOPRAZOLE SODIUM 40 MG: 40 TABLET, DELAYED RELEASE ORAL at 09:03

## 2020-03-04 RX ADMIN — ZOLPIDEM TARTRATE 5 MG: 5 TABLET ORAL at 09:03

## 2020-03-04 RX ADMIN — SOTALOL HYDROCHLORIDE 80 MG: 80 TABLET ORAL at 10:03

## 2020-03-04 RX ADMIN — HYDROXYCHLOROQUINE SULFATE 200 MG: 200 TABLET, FILM COATED ORAL at 10:03

## 2020-03-04 RX ADMIN — LORAZEPAM 1 MG: 1 TABLET ORAL at 02:03

## 2020-03-04 RX ADMIN — LOPERAMIDE HYDROCHLORIDE 2 MG: 2 CAPSULE ORAL at 02:03

## 2020-03-04 RX ADMIN — FAMOTIDINE 20 MG: 20 TABLET, FILM COATED ORAL at 01:03

## 2020-03-04 NOTE — PLAN OF CARE
Pt admitted and care plan initiated.pt reports a 30lb unintentional weight loss over the past year.reports decreased appetite and diff swallowing solids.states she gets nauseated and vomits every night at 2;30am.no n/v noted tonight.ivf in progress.awaiting GI consult in am.reports adequate pain relief.pt free of falls or injuries.safety precautions maintained.continue plan of care.

## 2020-03-04 NOTE — CONSULTS
"  [facility]  Adult Nutrition  Consult Note    SUMMARY     Recommendations    Pt meets severe malnutrition criteria.     1. Continue regular diet as tolerated.     2. Add Boost Plus with meals to aid in caloric intake.     3. If TF warranted, recommend TF of Isosource 1.5 advancing as tolerated to goal rate of 45 mL/hr to provide 1620 kcal, 73 gm protein, and 825 mL free fluid.     4. If TPN warranted, recommend Custom TPN 82 gm AA / 250 gm Dex + IV lipids to provide 1619 kcal, 82 gm protein, and GIR 4.26.     5. RD following.     Goals: meet >50% EEN/EPN by RD follow-up  Nutrition Goal Status: new  Communication of RD Recs: (POC)    Reason for Assessment    Reason For Assessment: consult  Diagnosis: (Abnormal weight loss)  Relevant Medical History:  recently diagnosed lupus, SVT   Interdisciplinary Rounds: did not attend  General Information Comments: Pt resting in bed with no family members at bedside. Pt reports no appetite and agreed to try Boost Plus. Per chart review, pt eating 25% of meals. C/o nausea. Pt states that appetite was been decreased/worsening for 1 year PTA. Experiences N/V/D almost daily. UBW 128lbs 1 year ago. NFPE completed. Pt with mild-severe muscle/fat wasting. Pt reports being very small compared to normal. Pt meets severe malnutrition criteria.   Nutrition Discharge Planning: adequate po intake    Nutrition Risk Screen    Nutrition Risk Screen: dysphagia or difficulty swallowing    Nutrition/Diet History    Spiritual, Cultural Beliefs, Adventism Practices, Values that Affect Care: no  Factors Affecting Nutritional Intake: abdominal pain, decreased appetite, nausea/vomiting    Anthropometrics    Temp: 97.5 °F (36.4 °C)  Height Method: Stated  Height: 5' 2" (157.5 cm)  Height (inches): 62 in  Weight Method: Standard Scale  Weight: 40.7 kg (89 lb 11.6 oz)  Weight (lb): 89.73 lb  Ideal Body Weight (IBW), Female: 110 lb  % Ideal Body Weight, Female (lb): 81.57 %  BMI " (Calculated): 16.4  BMI Grade: 16 - 16.9 protein-energy malnutrition grade II  Weight Loss: unintentional  Usual Body Weight (UBW), k.18 kg(UBW x 1 year per pt)  % Usual Body Weight: 70.1  % Weight Change From Usual Weight: -30.05 %     Lab/Procedures/Meds    Pertinent Labs Reviewed: reviewed  Pertinent Medications Reviewed: reviewed  Pertinent Medications Comments: famotidine, pantprazole, sotalol, IVF    Estimated/Assessed Needs    Weight Used For Calorie Calculations: 40.7 kg (89 lb 11.6 oz)  Energy Calorie Requirements (kcal): 1628 kcal/day  Energy Need Method: Kcal/kg(40)  Protein Requirements: 61-82 gm/day(1.5-2.0 gm/kg)  Weight Used For Protein Calculations: 40.7 kg (89 lb 11.6 oz)  Fluid Requirements (mL): 1 mL/kcal or per MD     RDA Method (mL): 1628     Nutrition Prescription Ordered    Current Diet Order: Regular    Evaluation of Received Nutrient/Fluid Intake    I/O: +2.54L since admit  Comments: LBM 3/2  Tolerance: tolerating  % Intake of Estimated Energy Needs: 0 - 25 %  % Meal Intake: 0 - 25 %    Nutrition Risk    Level of Risk/Frequency of Follow-up: (f/u 1 x wk)     Assessment and Plan    Severe malnutrition  Nutrition Problem:  Severe Protein-Calorie Malnutrition  Malnutrition in the context of Chronic Illness/Injury    Related to (etiology):  Inadequate Energy Intake 2/2 abdominal pain, nausea, vomiting, diarrhea    Signs and Symptoms (as evidenced by):  Energy Intake: <75% of estimated energy requirement for > 1 year; <50% of estimated energy requirement for > 1 month  Body Fat Depletion: moderate depletion of triceps   Muscle Mass Depletion: mild, moderate and severe depletion of temples, clavicle region and lower extremities   Weight Loss: 30% x 1 year per pt     Interventions (treatment strategy):  Collaboration of nutrition care with other providers    Nutrition Diagnosis Status:  New             Monitor and Evaluation    Food and Nutrient Intake: energy intake, food and beverage  intake  Food and Nutrient Adminstration: diet order  Physical Activity and Function: nutrition-related ADLs and IADLs  Anthropometric Measurements: weight, weight change, body mass index  Biochemical Data, Medical Tests and Procedures: electrolyte and renal panel, gastrointestinal profile, glucose/endocrine profile, inflammatory profile, lipid profile  Nutrition-Focused Physical Findings: overall appearance     Malnutrition Assessment  Malnutrition Type: chronic illness          Weight Loss (Malnutrition): (30% x 1 year per pt)  Energy Intake (Malnutrition): less than or equal to 50% for greater than or equal to 1 month(less than 75% for greater than 1 year)   Upper Arm Region (Subcutaneous Fat Loss): moderate depletion   [place] Region (Muscle Loss): moderate depletion  Clavicle Bone Region (Muscle Loss): severe depletion  Clavicle and Acromion Bone Region (Muscle Loss): severe depletion  Anterior Thigh Region (Muscle Loss): mild depletion  Posterior Calf Region (Muscle Loss): well nourished                 Nutrition Follow-Up    RD Follow-up?: Yes

## 2020-03-04 NOTE — PROGRESS NOTES
"[facility] Hospital Medicine  Progress Note    Patient Name: Luciana Berger  MRN: [id number]  Patient Class: OP- Observation   Admission Date: 3/3/2020  Length of Stay: 0 days  Attending Physician: JONATHON Huggins MD  Primary Care Provider: Sebastien Yousif Jr, MD    [facility] Medicine Team: [facility] MED F Edilia Alonso PA-C    Subjective:     Principal Problem:Abnormal weight loss        HPI:  58 yo F with PMHx recently diagnosed lupus and SVT presents to the ED complaining of inability to tolerate PO intake. The patient reports that the issue is chronic, and she has been evaluated by GI at [facility]. Within the last couple of days, she reports that her symptoms have gotten worse. She reports that everything she tries to take by mouth, both solids and liquids causes her stomach discomfort. She has no appetite and has to even force herself to drink sprite with her medications. She also reports that food over the last couple of days seems to have an awful taste. She has had only one episode of actual vomiting yesterday which was a small amount of clear liquid. She denies any acute diarrhea, but she endorses diarrhea that keeps in the bathroom for several hours each night. She says that around 1-2am every night like clockwork she has stomach cramps and needs to get out of bed to use the toilet, and she ends up being there for at least an hour because of diarrhea. The diarrhea seems to be worse the more food she eats. She states that everything she eats seems to come out of one end or the other within a few hours. Her diarrhea is not acutely worse. She reports that her last BM was last night around 1 am. Today, she started to feel lightheaded and suspected it was 2/2 to dehydration.     Per the patient's PCP documentation in the care everywhere tab, "Upper GI done recently with GI at [facility] with no significant finding. Previous upper GI showed ulcers which appear to have healed. She continues " "to follow up with them for abnormal weight loss evaluation. Also seeing rheumatology who has recently diagnosed patient with Lupus (~2 months ago per patient) for which she is taking Plaquenil." She states she was referred here to Dr. Cobb for GI from [facility] for further work-up of her symptoms. She has an appointment scheduled for 4/28. She called Dr. Cobb's today because of the acute worsening of her symptoms in an attempt to be seen earlier. She was instructed to go to the ED for further evaluation and possible need for IV fluids as no appointments were available for today.    Overview/Hospital Course:  Mrs. Berger was admitted to observation for abnormal weight loss. GI consulted, recommend gastric emptying study, ordered. Hgb 12.2-->9.2, anemia work up pending though could be dilutional as patient given 2L IVF and started on mIVF.  No active signs of bleeding. TSH WNL. Celiac panel pending. Suspect discharge tomorrow after gastric emptying study.    Interval History: Patient reports generalized abdominal cramping, N/V, and mild frequency and urgency. Multiple episodes of diarrhea. On mIVF. Hgb dropped 12-->9, likely diliutional but will get anemia work up. Celiac panel pending. TSH WNL. GI evaluated, recommend gastric emptying study for gastroparesis. Nutrition consulted, recommend boost supplements. Vitamin D pending. UA negative.     Review of Systems   Constitutional: Positive for activity change, appetite change and fatigue. Negative for chills and fever.   HENT: Negative for congestion and rhinorrhea.    Respiratory: Negative for cough and shortness of breath.    Cardiovascular: Negative for chest pain and leg swelling.   Gastrointestinal: Positive for abdominal pain, diarrhea, nausea and vomiting. Negative for abdominal distention, blood in stool and constipation.   Genitourinary: Positive for frequency and urgency. Negative for difficulty urinating, dysuria, flank pain and hematuria. "   Musculoskeletal: Negative for arthralgias and back pain.   Skin: Negative for color change and pallor.   Neurological: Positive for weakness. Negative for numbness and headaches.   Psychiatric/Behavioral: Negative for agitation and behavioral problems.     Objective:     Vital Signs (Most Recent):  Temp: 98 °F (36.7 °C) (03/04/20 1240)  Pulse: 70 (03/04/20 1240)  Resp: 17 (03/04/20 1240)  BP: 137/80 (03/04/20 1240)  SpO2: 95 % (03/04/20 1240) Vital Signs (24h Range):  Temp:  [96.7 °F (35.9 °C)-98.5 °F (36.9 °C)] 98 °F (36.7 °C)  Pulse:  [69-80] 70  Resp:  [15-18] 17  SpO2:  [95 %-100 %] 95 %  BP: ()/(52-80) 137/80     Weight: 40.7 kg (89 lb 11.6 oz)  Body mass index is 16.41 kg/m².    Intake/Output Summary (Last 24 hours) at 3/4/2020 1320  Last data filed at 3/4/2020 0600  Gross per 24 hour   Intake 3040 ml   Output 500 ml   Net 2540 ml      Physical Exam   Constitutional: She is oriented to person, place, and time. She appears well-developed and well-nourished.   Pleasant, thin appearing female   HENT:   Head: Normocephalic and atraumatic.   Eyes: Pupils are equal, round, and reactive to light. EOM are normal.   Neck: Normal range of motion. Neck supple.   Cardiovascular: Normal rate and regular rhythm.   Pulmonary/Chest: Effort normal and breath sounds normal.   Abdominal: Soft. Bowel sounds are normal. There is no tenderness. There is no guarding.   Hyperactive bowel sounds   Musculoskeletal: Normal range of motion. She exhibits no edema.   Neurological: She is alert and oriented to person, place, and time.   Skin: Skin is warm and dry. Capillary refill takes less than 2 seconds.   Psychiatric: She has a normal mood and affect. Her behavior is normal.   Nursing note and vitals reviewed.      Significant Labs:   Bilirubin:   Recent Labs   Lab 03/03/20  1414   BILITOT 0.9     CBC:   Recent Labs   Lab 03/03/20  1414 03/04/20  0452   WBC 3.23* 2.65*   HGB 12.2 9.2*   HCT 39.1 29.2*    209     CMP:    Recent Labs   Lab 03/03/20  1414 03/04/20  0452    141   K 3.7 3.7    112*   CO2 29 24   GLU 93 87   BUN 5* 7   CREATININE 0.8 0.8   CALCIUM 9.5 7.8*   PROT 7.2  --    ALBUMIN 3.9  --    BILITOT 0.9  --    ALKPHOS 62  --    AST 21  --    ALT 12  --    ANIONGAP 8 5*   EGFRNONAA >60.0 >60.0     Lipase:   Recent Labs   Lab 03/03/20  1414   LIPASE 9     Magnesium: No results for input(s): MG in the last 48 hours.  Troponin: No results for input(s): TROPONINI in the last 48 hours.  Urine Studies:   Recent Labs   Lab 03/03/20  1437   COLORU Yellow   APPEARANCEUA Clear   PHUR 6.0   SPECGRAV 1.015   PROTEINUA Negative   GLUCUA Negative   KETONESU Negative   BILIRUBINUA Negative   OCCULTUA Negative   NITRITE Negative   LEUKOCYTESUR Negative       Significant Imaging: I have reviewed all pertinent imaging results/findings within the past 24 hours.      Assessment/Plan:      * Abnormal weight loss  Abdominal pain  Diarrhea  Anxiety  Severe malnutrition  Patient presents with chronic abdominal cramping and diarrhea, nausea, bloating, decreased appetite, and weight loss. She reports abdominal cramping 30 minutes after meals with diarrhea. No melena/ hematochezia. Has had a few episodes of vomiting food.  -Seen by [facility] without clear diagnosis. EGD recently performed. Was referred to GI here and has appointment 4/28  -Reporting 30 lbs weight loss over last year, but weights documented in PCP notes show a much more chronic loss of weight  -Symptoms could possibly be manifestation of lupus, weight loss often occurs prior to the diagnosis of SLE  - No bloody bowel movements, lower concern for ischemia  - CT with stool burden on admit, but currently having diarrhea  - GI consulted, appreciate recs  - suspect IBS with anxiety, but concern for delayed gastric emptying.  - celiac labs pending  - vitamin D pending  - gastric emptying study pending, will hold bentyl at midnight for study  - dietary consulted, recommend  boost plus with meals for   - suspect discharge in am after gastric emptying study  - will need follow up with GI     Wt over last several years as reported in care everywhere:  2014: 126lbs  2015: 118lbs  2016: 115lbs  2017 111 lbs  2018: 103lbs  2019: 98 lbs  2020: 90 lbs      -GI consulted, to see patient in am. Monitor in observation and treat acute symptoms of nausea symptomatically.  -Continue bentyl and PPI      Anemia  - Hgb 12--> 9.2  - anemia labs pending  - suspect dilutional in setting of >2L IVF  - no evidence of bleeding  - continue to monitor    GERD (gastroesophageal reflux disease)  - continue protonix daily    SLE (systemic lupus erythematosus related syndrome)  -Pt.reports being diagnosed ~2 months ago after seeing derm for rash/hair loss and being referred to rheumatologist for further serum testing. Unclear if current symptoms are related  -Continue current regimen of Plaquenil, continue to f/u with rheumatology outpatient      VTE Risk Mitigation (From admission, onward)         Ordered     IP VTE LOW RISK PATIENT  Once      03/03/20 1631     Place REX hose  Until discontinued      03/03/20 1631     Place sequential compression device  Until discontinued      03/03/20 1631                      Edilia Alonso PA-C  Department of Hospital Medicine   [facility]

## 2020-03-04 NOTE — NURSING TRANSFER
Nursing Transfer Note    Pt arrived from the er via stretcher accompanied by transporter and .aaox4.resp even and non labored.vss.safety precautions implemented.call bell in reach.will monitor.

## 2020-03-04 NOTE — PLAN OF CARE
Problem: Oral Intake Inadequate  Goal: Improved Oral Intake  Outcome: Ongoing, Progressing     Problem: Malnutrition  Goal: Improved Nutritional Intake  Outcome: Ongoing, Progressing       Recommendations    Pt meets severe malnutrition criteria.     1. Continue regular diet as tolerated.     2. Add Boost Plus with meals to aid in caloric intake.     3. If TF warranted, recommend TF of Isosource 1.5 advancing as tolerated to goal rate of 45 mL/hr to provide 1620 kcal, 73 gm protein, and 825 mL free fluid.     4. If TPN warranted, recommend Custom TPN 82 gm AA / 250 gm Dex + IV lipids to provide 1619 kcal, 82 gm protein, and GIR 4.26.     5. RD following.     Goals: meet >50% EEN/EPN by RD follow-up  Nutrition Goal Status: new

## 2020-03-04 NOTE — ASSESSMENT & PLAN NOTE
-Pt.reports being diagnosed ~2 months ago after seeing derm for rash/hair loss and being referred to rheumatologist for further serum testing. Unclear if current symptoms are related  -Continue current regimen of Plaquenil, continue to f/u with rheumatology outpatient

## 2020-03-04 NOTE — ASSESSMENT & PLAN NOTE
Nutrition Problem:  Severe Protein-Calorie Malnutrition  Malnutrition in the context of Chronic Illness/Injury    Related to (etiology):  Inadequate Energy Intake 2/2 abdominal pain, nausea, vomiting, diarrhea    Signs and Symptoms (as evidenced by):  Energy Intake: <75% of estimated energy requirement for > 1 year; <50% of estimated energy requirement for > 1 month  Body Fat Depletion: moderate depletion of triceps   Muscle Mass Depletion: mild, moderate and severe depletion of temples, clavicle region and lower extremities   Weight Loss: 30% x 1 year per pt     Interventions (treatment strategy):  Collaboration of nutrition care with other providers    Nutrition Diagnosis Status:  New

## 2020-03-04 NOTE — CONSULTS
[facility]  Gastroenterology Service  Consult Note    Patient Name: Luciana Berger  MRN: [id number]  Admission Date: 3/3/2020  Hospital Length of Stay: 0 days  Code Status: Full Code   Attending Provider: JONATHON Huggins MD   Consulting Provider: Moises Onofre MD  Primary Care Physician: Sebastien Yousif Jr, MD  Principal Problem:Abnormal weight loss    Inpatient consult to Gastroenterology  Consult performed by: Moises Onofre MD  Consult ordered by: Derick Lee MD        Subjective:     HPI: Luciana Berger is a 59 y.o. female with history of SLE (on plaquenil) who presents with vomiting, diarrhea and weight loss.    Patient reports about five years of GERD and 3-4 years of dysphagia (previous dilations), nausea, vomiting, abdominal pain, and diarrhea.  She feels like food takes longer to get into her stomach.  After a few hours, she starts having nausea and vomiting.  Typically wakes up from sleep around 1-2AM with vomiting and sometimes diarrhea.  Try cutting out milk products, but no relief.  She has not found any specific food triggers.  Has an abdominal cramping in mid and lower abdomen which is relieved with Bentyl.  She notes a history of H pylori which was treated.  Most recently had EGD last month with GI at [facility] - previously seen ulcers had healed, and otherwise unremarkable. Last colonoscopy 4 years ago for screening unremarkable.    Most recently, patient notes worsening heartburn for the last 2 weeks.  She also notes intermittent dysphagia of but nothing that is changed from her baseline.  There is no change in her diarrhea or frequency of vomiting.  No overt GI bleeding.  She became more concerned about her symptoms and weight loss (40# in last two years) so presented to the ED.    She takes Percocet as needed for neck and back pain. She is also on Ativan and Robaxin.  No prior abdominal surgeries.  Denies smoking alcohol or illicit drug use.  No family history of GI  malignancies or IBD.  Mother had lymphoma.    CT a/p here with steatosis and stool throughout colon.        Past Medical History:   Diagnosis Date    Anxiety     Arthritis     GERD (gastroesophageal reflux disease)     Neuromuscular disorder        Past Surgical History:   Procedure Laterality Date    left breast surgery in 1989 Left        Family History   Problem Relation Age of Onset    Cancer Mother     Heart disease Father        Social History     Socioeconomic History    Marital status:      Spouse name: Not on file    Number of children: Not on file    Years of education: Not on file    Highest education level: Not on file   Occupational History    Not on file   Social Needs    Financial resource strain: Not on file    Food insecurity:     Worry: Not on file     Inability: Not on file    Transportation needs:     Medical: Not on file     Non-medical: Not on file   Tobacco Use    Smoking status: Never Smoker   Substance and Sexual Activity    Alcohol use: No    Drug use: No    Sexual activity: Not on file   Lifestyle    Physical activity:     Days per week: Not on file     Minutes per session: Not on file    Stress: Not on file   Relationships    Social connections:     Talks on phone: Not on file     Gets together: Not on file     Attends [employer] service: Not on file     Active member of club or organization: Not on file     Attends meetings of clubs or organizations: Not on file     Relationship status: Not on file   Other Topics Concern    Not on file   Social History Narrative    Not on file       No current facility-administered medications on file prior to encounter.      Current Outpatient Medications on File Prior to Encounter   Medication Sig Dispense Refill    dicyclomine (BENTYL) 20 mg tablet Take 20 mg by mouth 2 (two) times daily.      famotidine (PEPCID) 20 MG tablet Take 20 mg by mouth once daily.       hydroxychloroquine (PLAQUENIL) 200 mg tablet Take 200  mg by mouth once daily.      LORazepam (ATIVAN) 1 MG tablet Take 1 mg by mouth 2 (two) times daily.      methocarbamol (ROBAXIN) 500 MG Tab Take 500 mg by mouth 3 (three) times daily as needed.      ondansetron (ZOFRAN-ODT) 4 MG TbDL Take 4 mg by mouth every 8 (eight) hours as needed.      oxycodone-acetaminophen (PERCOCET) 5-325 mg per tablet Take 1 tablet by mouth every 4 (four) hours as needed for Pain.      pantoprazole (PROTONIX) 20 MG tablet Take 20 mg by mouth 2 (two) times daily before meals.      sotalol (BETAPACE) 80 MG tablet Take 80 mg by mouth 2 (two) times daily.      zolpidem (AMBIEN) 10 mg Tab Take 10 mg by mouth nightly as needed.      esomeprazole (NEXIUM) 20 mg GrPS Take 20 mg by mouth before breakfast.         Review of patient's allergies indicates:  No Known Allergies    Review of Systems:   Constitutional: no fever, chills  Eyes: no visual changes   ENT: no sore throat  Respiratory: no cough or shortness of breath   Cardiovascular: no chest pain or palpitations   Gastrointestinal: as per HPI  Hematologic/Lymphatic: no easy bruising or lymphadenopathy   Musculoskeletal: no arthralgias or myalgias   Neurological: no change in mental status  Behavioral/Psych: no change in mood    Objective:     Vitals:    03/04/20 0438   BP: (!) 98/52   Pulse: 75   Resp: 18   Temp: 97.8 °F (36.6 °C)       General: Alert and Oriented, no distress. Thin  HEENT: Normocephalic, Atraumatic. No scleral icterus.  Resp: Good air entry bilaterally, no adventitious sounds  Cardiac: S1 and S2 normal  Abdomen: Normoactive bowel sounds. Non-distended. Normal tympany. Soft. Non-tender. No peritoneal signs.  Extremities: No peripheral edema.   Neurologic: No gross neurological Deficits  Psych: Calm, cooperative. Normal mood and affect.    Significant Labs:  Recent Labs   Lab 03/03/20  1414   HGB 12.2       Lab Results   Component Value Date    WBC 3.23 (L) 03/03/2020    HGB 12.2 03/03/2020    HCT 39.1 03/03/2020    MCV  95 03/03/2020     03/03/2020       Lab Results   Component Value Date     03/03/2020    K 3.7 03/03/2020     03/03/2020    CO2 29 03/03/2020    BUN 5 (L) 03/03/2020    CREATININE 0.8 03/03/2020    CALCIUM 9.5 03/03/2020    ANIONGAP 8 03/03/2020    ESTGFRAFRICA >60.0 03/03/2020    EGFRNONAA >60.0 03/03/2020       Lab Results   Component Value Date    ALT 12 03/03/2020    AST 21 03/03/2020    ALKPHOS 62 03/03/2020    BILITOT 0.9 03/03/2020       No results found for: INR    Significant Imaging:  Reviewed pertinent radiology findings.       Assessment/Plan:     Luciana Berger is a 59 y.o. female with history of SLE (on plaquenil) who presents with vomiting, diarrhea and weight loss.    Symptoms are chronic, ongoing for years. Suspect IBS with anxiety. Vomiting occurs hours after eating. Possible delayed gastric emptying. Usually more immediate vomiting would suggest psychogenic component or rumination. Abdominal pain appears to be responding well to bentyl. Regarding diarrhea, patient actually has stool burden on CT. No use in sending stool studies for chronicity.    Problem List:  1. IBS  2. Vomiting  3. Chronic diarrhea  4. Weight loss    Plan:  1. Recommend gastric emptying study (ideally off narcotics or any motility agents). Can be done inpatient or outpatient.  2. No need to repeat endoscopy which was just recently done. Would be helpful to get OSH records from [facility]  3. Continue bentyl prn  4. Will follow up in GI clinic    Thank you for involving us in the care of Luciana Berger. Please call with any additional questions, concerns or changes in the patient's clinical status.    Moises Onofre MD  Gastroenterology Fellow PGY IV   [facility]

## 2020-03-04 NOTE — SUBJECTIVE & OBJECTIVE
Interval History: Patient reports generalized abdominal cramping, N/V, and mild frequency and urgency. Multiple episodes of diarrhea. On mIVF. Hgb dropped 12-->9, likely diliutional but will get anemia work up. Celiac panel pending. TSH WNL. GI evaluated, recommend gastric emptying study for gastroparesis. Nutrition consulted, recommend boost supplements. Vitamin D pending. UA negative.     Review of Systems   Constitutional: Positive for activity change, appetite change and fatigue. Negative for chills and fever.   HENT: Negative for congestion and rhinorrhea.    Respiratory: Negative for cough and shortness of breath.    Cardiovascular: Negative for chest pain and leg swelling.   Gastrointestinal: Positive for abdominal pain, diarrhea, nausea and vomiting. Negative for abdominal distention, blood in stool and constipation.   Genitourinary: Positive for frequency and urgency. Negative for difficulty urinating, dysuria, flank pain and hematuria.   Musculoskeletal: Negative for arthralgias and back pain.   Skin: Negative for color change and pallor.   Neurological: Positive for weakness. Negative for numbness and headaches.   Psychiatric/Behavioral: Negative for agitation and behavioral problems.     Objective:     Vital Signs (Most Recent):  Temp: 98 °F (36.7 °C) (03/04/20 1240)  Pulse: 70 (03/04/20 1240)  Resp: 17 (03/04/20 1240)  BP: 137/80 (03/04/20 1240)  SpO2: 95 % (03/04/20 1240) Vital Signs (24h Range):  Temp:  [96.7 °F (35.9 °C)-98.5 °F (36.9 °C)] 98 °F (36.7 °C)  Pulse:  [69-80] 70  Resp:  [15-18] 17  SpO2:  [95 %-100 %] 95 %  BP: ()/(52-80) 137/80     Weight: 40.7 kg (89 lb 11.6 oz)  Body mass index is 16.41 kg/m².    Intake/Output Summary (Last 24 hours) at 3/4/2020 1320  Last data filed at 3/4/2020 0600  Gross per 24 hour   Intake 3040 ml   Output 500 ml   Net 2540 ml      Physical Exam   Constitutional: She is oriented to person, place, and time. She appears well-developed and well-nourished.    Pleasant, thin appearing female   HENT:   Head: Normocephalic and atraumatic.   Eyes: Pupils are equal, round, and reactive to light. EOM are normal.   Neck: Normal range of motion. Neck supple.   Cardiovascular: Normal rate and regular rhythm.   Pulmonary/Chest: Effort normal and breath sounds normal.   Abdominal: Soft. Bowel sounds are normal. There is no tenderness. There is no guarding.   Hyperactive bowel sounds   Musculoskeletal: Normal range of motion. She exhibits no edema.   Neurological: She is alert and oriented to person, place, and time.   Skin: Skin is warm and dry. Capillary refill takes less than 2 seconds.   Psychiatric: She has a normal mood and affect. Her behavior is normal.   Nursing note and vitals reviewed.      Significant Labs:   Bilirubin:   Recent Labs   Lab 03/03/20  1414   BILITOT 0.9     CBC:   Recent Labs   Lab 03/03/20  1414 03/04/20  0452   WBC 3.23* 2.65*   HGB 12.2 9.2*   HCT 39.1 29.2*    209     CMP:   Recent Labs   Lab 03/03/20  1414 03/04/20  0452    141   K 3.7 3.7    112*   CO2 29 24   GLU 93 87   BUN 5* 7   CREATININE 0.8 0.8   CALCIUM 9.5 7.8*   PROT 7.2  --    ALBUMIN 3.9  --    BILITOT 0.9  --    ALKPHOS 62  --    AST 21  --    ALT 12  --    ANIONGAP 8 5*   EGFRNONAA >60.0 >60.0     Lipase:   Recent Labs   Lab 03/03/20  1414   LIPASE 9     Magnesium: No results for input(s): MG in the last 48 hours.  Troponin: No results for input(s): TROPONINI in the last 48 hours.  Urine Studies:   Recent Labs   Lab 03/03/20  1437   COLORU Yellow   APPEARANCEUA Clear   PHUR 6.0   SPECGRAV 1.015   PROTEINUA Negative   GLUCUA Negative   KETONESU Negative   BILIRUBINUA Negative   OCCULTUA Negative   NITRITE Negative   LEUKOCYTESUR Negative       Significant Imaging: I have reviewed all pertinent imaging results/findings within the past 24 hours.

## 2020-03-04 NOTE — HPI
"60 yo F with PMHx recently diagnosed lupus and SVT presents to the ED complaining of inability to tolerate PO intake. The patient reports that the issue is chronic, and she has been evaluated by GI at [facility]. Within the last couple of days, she reports that her symptoms have gotten worse. She reports that everything she tries to take by mouth, both solids and liquids causes her stomach discomfort. She has no appetite and has to even force herself to drink sprite with her medications. She also reports that food over the last couple of days seems to have an awful taste. She has had only one episode of actual vomiting yesterday which was a small amount of clear liquid. She denies any acute diarrhea, but she endorses diarrhea that keeps in the bathroom for several hours each night. She says that around 1-2am every night like clockwork she has stomach cramps and needs to get out of bed to use the toilet, and she ends up being there for at least an hour because of diarrhea. The diarrhea seems to be worse the more food she eats. She states that everything she eats seems to come out of one end or the other within a few hours. Her diarrhea is not acutely worse. She reports that her last BM was last night around 1 am. Today, she started to feel lightheaded and suspected it was 2/2 to dehydration.     Per the patient's PCP documentation in the care everywhere tab, "Upper GI done recently with GI at [facility] with no significant finding. Previous upper GI showed ulcers which appear to have healed. She continues to follow up with them for abnormal weight loss evaluation. Also seeing rheumatology who has recently diagnosed patient with Lupus (~2 months ago per patient) for which she is taking Plaquenil." She states she was referred here to Dr. Cobb for GI from [facility] GI for further work-up of her symptoms. She has an appointment scheduled for 4/28. She called Dr. Cobb's today because of the acute worsening of her " symptoms in an attempt to be seen earlier. She was instructed to go to the ED for further evaluation and possible need for IV fluids as no appointments were available for today.

## 2020-03-04 NOTE — ASSESSMENT & PLAN NOTE
Abdominal pain  Diarrhea  Anxiety  Severe malnutrition  Patient presents with chronic abdominal cramping and diarrhea, nausea, bloating, decreased appetite, and weight loss. She reports abdominal cramping 30 minutes after meals with diarrhea. No melena/ hematochezia. Has had a few episodes of vomiting food.  -Seen by [facility] L[facility] without clear diagnosis. EGD recently performed. Was referred to GI here and has appointment 4/28  -Reporting 30 lbs weight loss over last year, but weights documented in PCP notes show a much more chronic loss of weight  -Symptoms could possibly be manifestation of lupus, weight loss often occurs prior to the diagnosis of SLE  - No bloody bowel movements, lower concern for ischemia  - CT with stool burden on admit, but currently having diarrhea  - GI consulted, appreciate recs  - suspect IBS with anxiety, but concern for delayed gastric emptying.  - celiac labs pending  - vitamin D pending  - gastric emptying study pending, will hold bentyl at midnight for study  - dietary consulted, recommend boost plus with meals for   - suspect discharge in am after gastric emptying study  - will need follow up with GI     Wt over last several years as reported in care everywhere:  2014: 126lbs  2015: 118lbs  2016: 115lbs  2017 111 lbs  2018: 103lbs  2019: 98 lbs  2020: 90 lbs      -GI consulted, to see patient in am. Monitor in observation and treat acute symptoms of nausea symptomatically.  -Continue bentyl and PPI

## 2020-03-04 NOTE — PLAN OF CARE
03/04/20 1031   Discharge Assessment   Assessment Type Discharge Planning Assessment   Confirmed/corrected address and phone number on facesheet? Yes   Assessment information obtained from? Patient   Expected Length of Stay (days) 1   Communicated expected length of stay with patient/caregiver yes   Prior to hospitilization cognitive status: Alert/Oriented   Prior to hospitalization functional status: Independent   Current cognitive status: Alert/Oriented   Current Functional Status: Independent   Lives With spouse   Able to Return to Prior Arrangements yes   Is patient able to care for self after discharge? Yes   Patient's perception of discharge disposition home or selfcare   Readmission Within the Last 30 Days no previous admission in last 30 days   Patient currently being followed by outpatient case management? No   Patient currently receives any other outside agency services? No   Equipment Currently Used at Home none   Do you have any problems affording any of your prescribed medications? No   Is the patient taking medications as prescribed? yes   Does the patient have transportation home? Yes   Transportation Anticipated family or friend will provide   Does the patient receive services at the [facility]? No   Discharge Plan A Home   Discharge Plan B Home with family   DME Needed Upon Discharge  none   Patient/Family in Agreement with Plan yes     SW completed discharge planning assessment at bedside with patient and family. Patient verified information on FS. Patient lives at home with her spouse in a one story home with no steps to enter. Patient denies use of assistive devices. Patient denies use of illicit drugs or alcohol. Patient has no concerns at this time.    Sebastien Yousif Jr, MD  No Pharmacies Listed    Jo Evans LMSW [facility]  [id number]

## 2020-03-04 NOTE — NURSING
Pt arrived to unit via wheelchair from ED. Pt AAOx4. Pt denies pain or SOB upon arrival. . Bed in low position with call light within reach. Plan of care and fall precautions reviewed with pt. Family at bedside.

## 2020-03-04 NOTE — ASSESSMENT & PLAN NOTE
- Hgb 12--> 9.2  - anemia labs pending  - suspect dilutional in setting of >2L IVF  - no evidence of bleeding  - continue to monitor

## 2020-03-05 VITALS
SYSTOLIC BLOOD PRESSURE: 120 MMHG | DIASTOLIC BLOOD PRESSURE: 59 MMHG | TEMPERATURE: 98 F | HEART RATE: 69 BPM | WEIGHT: 89.75 LBS | BODY MASS INDEX: 16.52 KG/M2 | RESPIRATION RATE: 16 BRPM | OXYGEN SATURATION: 100 % | HEIGHT: 62 IN

## 2020-03-05 LAB
25(OH)D3+25(OH)D2 SERPL-MCNC: 26 NG/ML (ref 30–96)
ANION GAP SERPL CALC-SCNC: 7 MMOL/L (ref 8–16)
BASOPHILS # BLD AUTO: 0.01 K/UL (ref 0–0.2)
BASOPHILS NFR BLD: 0.4 % (ref 0–1.9)
BUN SERPL-MCNC: 9 MG/DL (ref 6–20)
CALCIUM SERPL-MCNC: 8.6 MG/DL (ref 8.7–10.5)
CHLORIDE SERPL-SCNC: 107 MMOL/L (ref 95–110)
CO2 SERPL-SCNC: 27 MMOL/L (ref 23–29)
CREAT SERPL-MCNC: 0.8 MG/DL (ref 0.5–1.4)
DIFFERENTIAL METHOD: ABNORMAL
EOSINOPHIL # BLD AUTO: 0.1 K/UL (ref 0–0.5)
EOSINOPHIL NFR BLD: 4.1 % (ref 0–8)
ERYTHROCYTE [DISTWIDTH] IN BLOOD BY AUTOMATED COUNT: 12.5 % (ref 11.5–14.5)
EST. GFR  (AFRICAN AMERICAN): >60 ML/MIN/1.73 M^2
EST. GFR  (NON AFRICAN AMERICAN): >60 ML/MIN/1.73 M^2
FERRITIN SERPL-MCNC: 22 NG/ML (ref 20–300)
FOLATE SERPL-MCNC: 5.3 NG/ML (ref 4–24)
GLUCOSE SERPL-MCNC: 102 MG/DL (ref 70–110)
H PYLORI IGG SERPL QL IA: POSITIVE
HCT VFR BLD AUTO: 31.5 % (ref 37–48.5)
HGB BLD-MCNC: 9.6 G/DL (ref 12–16)
IMM GRANULOCYTES # BLD AUTO: 0.01 K/UL (ref 0–0.04)
IMM GRANULOCYTES NFR BLD AUTO: 0.4 % (ref 0–0.5)
LYMPHOCYTES # BLD AUTO: 1.4 K/UL (ref 1–4.8)
LYMPHOCYTES NFR BLD: 49.8 % (ref 18–48)
MCH RBC QN AUTO: 29.4 PG (ref 27–31)
MCHC RBC AUTO-ENTMCNC: 30.5 G/DL (ref 32–36)
MCV RBC AUTO: 96 FL (ref 82–98)
MONOCYTES # BLD AUTO: 0.3 K/UL (ref 0.3–1)
MONOCYTES NFR BLD: 11.1 % (ref 4–15)
NEUTROPHILS # BLD AUTO: 0.9 K/UL (ref 1.8–7.7)
NEUTROPHILS NFR BLD: 34.2 % (ref 38–73)
NRBC BLD-RTO: 0 /100 WBC
PLATELET # BLD AUTO: 208 K/UL (ref 150–350)
PLATELET BLD QL SMEAR: ABNORMAL
PMV BLD AUTO: 10.4 FL (ref 9.2–12.9)
POTASSIUM SERPL-SCNC: 3.9 MMOL/L (ref 3.5–5.1)
RBC # BLD AUTO: 3.27 M/UL (ref 4–5.4)
SODIUM SERPL-SCNC: 141 MMOL/L (ref 136–145)
VIT B12 SERPL-MCNC: 514 PG/ML (ref 210–950)
WBC # BLD AUTO: 2.71 K/UL (ref 3.9–12.7)

## 2020-03-05 PROCEDURE — 99217 PR OBSERVATION CARE DISCHARGE: CPT | Mod: ,,, | Performed by: PHYSICIAN ASSISTANT

## 2020-03-05 PROCEDURE — 99217 PR OBSERVATION CARE DISCHARGE: ICD-10-PCS | Mod: ,,, | Performed by: PHYSICIAN ASSISTANT

## 2020-03-05 PROCEDURE — 80048 BASIC METABOLIC PNL TOTAL CA: CPT

## 2020-03-05 PROCEDURE — 85025 COMPLETE CBC W/AUTO DIFF WBC: CPT

## 2020-03-05 PROCEDURE — 63600175 PHARM REV CODE 636 W HCPCS: Performed by: PHYSICIAN ASSISTANT

## 2020-03-05 PROCEDURE — 82728 ASSAY OF FERRITIN: CPT

## 2020-03-05 PROCEDURE — 96375 TX/PRO/DX INJ NEW DRUG ADDON: CPT

## 2020-03-05 PROCEDURE — G0378 HOSPITAL OBSERVATION PER HR: HCPCS

## 2020-03-05 PROCEDURE — 25000003 PHARM REV CODE 250: Performed by: HOSPITALIST

## 2020-03-05 PROCEDURE — 82746 ASSAY OF FOLIC ACID SERUM: CPT

## 2020-03-05 PROCEDURE — 25000003 PHARM REV CODE 250: Performed by: PHYSICIAN ASSISTANT

## 2020-03-05 PROCEDURE — 82607 VITAMIN B-12: CPT

## 2020-03-05 PROCEDURE — 82306 VITAMIN D 25 HYDROXY: CPT

## 2020-03-05 RX ORDER — ERGOCALCIFEROL 1.25 MG/1
50000 CAPSULE ORAL
Status: DISCONTINUED | OUTPATIENT
Start: 2020-03-05 | End: 2020-03-05 | Stop reason: HOSPADM

## 2020-03-05 RX ORDER — FERROUS SULFATE 325(65) MG
325 TABLET, DELAYED RELEASE (ENTERIC COATED) ORAL DAILY
Status: DISCONTINUED | OUTPATIENT
Start: 2020-03-06 | End: 2020-03-05 | Stop reason: HOSPADM

## 2020-03-05 RX ORDER — FERROUS SULFATE 325(65) MG
325 TABLET ORAL DAILY
Qty: 30 TABLET | Refills: 3 | Status: SHIPPED | OUTPATIENT
Start: 2020-03-06 | End: 2020-10-20 | Stop reason: SDUPTHER

## 2020-03-05 RX ADMIN — HYDROXYCHLOROQUINE SULFATE 200 MG: 200 TABLET, FILM COATED ORAL at 12:03

## 2020-03-05 RX ADMIN — LORAZEPAM 1 MG: 1 TABLET ORAL at 01:03

## 2020-03-05 RX ADMIN — PANTOPRAZOLE SODIUM 40 MG: 40 TABLET, DELAYED RELEASE ORAL at 12:03

## 2020-03-05 RX ADMIN — FAMOTIDINE 20 MG: 20 TABLET, FILM COATED ORAL at 12:03

## 2020-03-05 RX ADMIN — SODIUM CHLORIDE 125 MG: 9 INJECTION, SOLUTION INTRAVENOUS at 12:03

## 2020-03-05 RX ADMIN — ERGOCALCIFEROL 50000 UNITS: 1.25 CAPSULE ORAL at 12:03

## 2020-03-05 NOTE — PLAN OF CARE
Safety precautions maintained. Bed in low position wheels locked. Side rails up x 2. Call light within reach. Pt free of falls.

## 2020-03-05 NOTE — PROGRESS NOTES
Pt Dc per MD orders. Dc and prescription instructions given. Pt verbalizes understanding. PIV removed catheter tip intact. VSS. Pt escorted by transport via WC.

## 2020-03-05 NOTE — PLAN OF CARE
Patient's VSS, AAOx4, patient stated some discomfort and pain, refer to MAR. NPO @ [facility] for morning procedure. Patient had 1x Diarrhea and continued removal urine hat, unable to measure urine output. Patient resting in bed.

## 2020-03-05 NOTE — PLAN OF CARE
Pt progressing towards goals.NPO for Gastric Emptying Study in am.denies abd pain.adequate relief of nausea with zofran.no further diarrhea tonight.pt free of falls or injuries.safety precautions maintained.continue plan of care.

## 2020-03-06 LAB
GLIADIN PEPTIDE IGA SER-ACNC: 4 UNITS
GLIADIN PEPTIDE IGG SER-ACNC: 4 UNITS
IGA SERPL-MCNC: 205 MG/DL (ref 70–400)
TTG IGA SER-ACNC: 6 UNITS
TTG IGG SER-ACNC: 4 UNITS

## 2020-03-06 NOTE — PLAN OF CARE
03/06/20 0819   Final Note   Assessment Type Final Discharge Note   Anticipated Discharge Disposition Home   What phone number can be called within the next 1-3 days to see how you are doing after discharge? [phone]   Discharge plans and expectations educations in teach back method with documentation complete? Yes   Right Care Referral Info   Post Acute Recommendation No Care     Future Appointments   Date Time Provider Department Center   3/16/2020  1:20 PM Padmini Silva NP [facility]

## 2020-03-08 NOTE — DISCHARGE SUMMARY
"[facility] Hospital Medicine  Discharge Summary      Patient Name: Luciana Berger  MRN: [id number]  Admission Date: 3/3/2020  Hospital Length of Stay: 0 days  Discharge Date and Time: 3/5/2020  5:15 PM  Attending Physician: No att. providers found   Discharging Provider: Woody Wyman PA-C  Primary Care Provider: Sebastien Yousif Jr, MD  [facility] Medicine Team: [facility] MED F Woody Wyman PA-C    HPI:   60 yo F with PMHx recently diagnosed lupus and SVT presents to the ED complaining of inability to tolerate PO intake. The patient reports that the issue is chronic, and she has been evaluated by GI at [facility]. Within the last couple of days, she reports that her symptoms have gotten worse. She reports that everything she tries to take by mouth, both solids and liquids causes her stomach discomfort. She has no appetite and has to even force herself to drink sprite with her medications. She also reports that food over the last couple of days seems to have an awful taste. She has had only one episode of actual vomiting yesterday which was a small amount of clear liquid. She denies any acute diarrhea, but she endorses diarrhea that keeps in the bathroom for several hours each night. She says that around 1-2am every night like clockwork she has stomach cramps and needs to get out of bed to use the toilet, and she ends up being there for at least an hour because of diarrhea. The diarrhea seems to be worse the more food she eats. She states that everything she eats seems to come out of one end or the other within a few hours. Her diarrhea is not acutely worse. She reports that her last BM was last night around 1 am. Today, she started to feel lightheaded and suspected it was 2/2 to dehydration.     Per the patient's PCP documentation in the care everywhere tab, "Upper GI done recently with GI at [facility] with no significant finding. Previous upper GI showed ulcers which appear to have " "healed. She continues to follow up with them for abnormal weight loss evaluation. Also seeing rheumatology who has recently diagnosed patient with Lupus (~2 months ago per patient) for which she is taking Plaquenil." She states she was referred here to Dr. Cobb for GI from [facility] for further work-up of her symptoms. She has an appointment scheduled for 4/28. She called Dr. Cobb's today because of the acute worsening of her symptoms in an attempt to be seen earlier. She was instructed to go to the ED for further evaluation and possible need for IV fluids as no appointments were available for today.    * No surgery found *      Hospital Course:   Mrs. Berger was admitted to observation for abnormal weight loss. GI consulted, recommend gastric emptying study, ordered. Hgb 12.2-->9.2, anemia work up pending though could be dilutional as patient given 2L IVF and started on mIVF.  No active signs of bleeding. TSH WNL. Celiac panel pending. Gastric emptying study negative for acute pathology. Suspect IBS symptoms. Patient stable for discharge with GI follow up in clinic.     Consults:   Consults (From admission, onward)        Status Ordering Provider     Inpatient consult to Gastroenterology  Once     Provider:  (Not yet assigned)    Completed ARELIS HARRY     Inpatient consult to Registered Dietitian/Nutritionist  Once     Provider:  (Not yet assigned)    Completed JONATHON SALES          * Abnormal weight loss  Abdominal pain  Diarrhea  Anxiety  Severe malnutrition  Patient presents with chronic abdominal cramping and diarrhea, nausea, bloating, decreased appetite, and weight loss. She reports abdominal cramping 30 minutes after meals with diarrhea. No melena/ hematochezia. Has had a few episodes of vomiting food.  -Seen by [facility] without clear diagnosis. EGD recently performed. Was referred to GI here and has appointment 4/28  -Reporting 30 lbs weight loss over last year, but weights documented in " PCP notes show a much more chronic loss of weight  -Symptoms could possibly be manifestation of lupus, weight loss often occurs prior to the diagnosis of SLE  - No bloody bowel movements, lower concern for ischemia  - CT with stool burden on admit, but currently having diarrhea  - GI consulted, appreciate recs  - suspect IBS with anxiety, but concern for delayed gastric emptying.  - gastric emptying negative  - dietary consulted, recommend boost plus with meals for      Wt over last several years as reported in care everywhere:  2014: 126lbs  2015: 118lbs  2016: 115lbs  2017 111 lbs  2018: 103lbs  2019: 98 lbs  2020: 90 lbs   -Continue bentyl and PPI  - stable for discharge      Anemia  - Hgb 12--> 9.2  - anemia labs pending  - suspect dilutional in setting of >2L IVF  - no evidence of bleeding  - continue to monitor  - ferritin low, started on iron supplements    GERD (gastroesophageal reflux disease)  - continue protonix daily    SLE (systemic lupus erythematosus related syndrome)  -Pt.reports being diagnosed ~2 months ago after seeing derm for rash/hair loss and being referred to rheumatologist for further serum testing. Unclear if current symptoms are related  -Continue current regimen of Plaquenil, continue to f/u with rheumatology outpatient      Final Active Diagnoses:    Diagnosis Date Noted POA    PRINCIPAL PROBLEM:  Abnormal weight loss [R63.4] 03/03/2020 Yes    Severe malnutrition [E43] 03/04/2020 Yes    GERD (gastroesophageal reflux disease) [K21.9] 03/04/2020 Yes    Abdominal pain [R10.9] 03/04/2020 Yes    Diarrhea [R19.7] 03/04/2020 Yes    Anemia [D64.9] 03/04/2020 Yes    Anxiety [F41.9] 03/03/2020 Yes    SLE (systemic lupus erythematosus related syndrome) [M32.9] 03/03/2020 Yes      Problems Resolved During this Admission:       Discharged Condition: good    Disposition: Home or Self Care    Follow Up:  Follow-up Information     Sebastien Yousif Jr, MD.    Specialty:  Family Medicine  Why:  If  symptoms worsen  Contact information:  [address]  [facility] [id number]  [phone]                 Patient Instructions:      Ambulatory referral/consult to Gastroenterology   Standing Status: Future   Referral Priority: Routine Referral Type: Consultation   Referral Reason: Specialty Services Required   Requested Specialty: Gastroenterology   Number of Visits Requested: 1     Diet Adult Regular     Notify your health care provider if you experience any of the following:  severe uncontrolled pain     Notify your health care provider if you experience any of the following:  persistent nausea and vomiting or diarrhea     Activity as tolerated       Significant Diagnostic Studies: Labs: All labs within the past 24 hours have been reviewed    Pending Diagnostic Studies:     None         Medications:  Reconciled Home Medications:      Medication List      START taking these medications    ferrous sulfate 325 mg (65 mg iron) Tab tablet  Commonly known as:  FEOSOL  Take 1 tablet (325 mg total) by mouth once daily.        CONTINUE taking these medications    dicyclomine 20 mg tablet  Commonly known as:  BENTYL  Take 20 mg by mouth 2 (two) times daily.     esomeprazole 20 mg Grps  Commonly known as:  NEXIUM  Take 20 mg by mouth before breakfast.     famotidine 20 MG tablet  Commonly known as:  PEPCID  Take 20 mg by mouth once daily.     hydroxychloroquine 200 mg tablet  Commonly known as:  PLAQUENIL  Take 200 mg by mouth once daily.     LORazepam 1 MG tablet  Commonly known as:  ATIVAN  Take 1 mg by mouth 2 (two) times daily.     methocarbamol 500 MG Tab  Commonly known as:  ROBAXIN  Take 500 mg by mouth 3 (three) times daily as needed.     ondansetron 4 MG Tbdl  Commonly known as:  ZOFRAN-ODT  Take 4 mg by mouth every 8 (eight) hours as needed.     oxyCODONE-acetaminophen 5-325 mg per tablet  Commonly known as:  PERCOCET  Take 1 tablet by mouth every 4 (four) hours as needed for Pain.     pantoprazole 20 MG  tablet  Commonly known as:  PROTONIX  Take 20 mg by mouth 2 (two) times daily before meals.     sotalol 80 MG tablet  Commonly known as:  BETAPACE  Take 80 mg by mouth 2 (two) times daily.     zolpidem 10 mg Tab  Commonly known as:  AMBIEN  Take 10 mg by mouth nightly as needed.            Indwelling Lines/Drains at time of discharge:   Lines/Drains/Airways     None                 Time spent on the discharge of patient: 32 minutes  Patient was seen and examined on the date of discharge and determined to be suitable for discharge.         Woody Wyman PA-C  Department of Hospital Medicine  [facility]

## 2020-03-08 NOTE — ASSESSMENT & PLAN NOTE
- Hgb 12--> 9.2  - anemia labs pending  - suspect dilutional in setting of >2L IVF  - no evidence of bleeding  - continue to monitor  - ferritin low, started on iron supplements

## 2020-03-08 NOTE — ASSESSMENT & PLAN NOTE
Abdominal pain  Diarrhea  Anxiety  Severe malnutrition  Patient presents with chronic abdominal cramping and diarrhea, nausea, bloating, decreased appetite, and weight loss. She reports abdominal cramping 30 minutes after meals with diarrhea. No melena/ hematochezia. Has had a few episodes of vomiting food.  -Seen by [facility] without clear diagnosis. EGD recently performed. Was referred to GI here and has appointment 4/28  -Reporting 30 lbs weight loss over last year, but weights documented in PCP notes show a much more chronic loss of weight  -Symptoms could possibly be manifestation of lupus, weight loss often occurs prior to the diagnosis of SLE  - No bloody bowel movements, lower concern for ischemia  - CT with stool burden on admit, but currently having diarrhea  - GI consulted, appreciate recs  - suspect IBS with anxiety, but concern for delayed gastric emptying.  - gastric emptying negative  - dietary consulted, recommend boost plus with meals for      Wt over last several years as reported in care everywhere:  2014: 126lbs  2015: 118lbs  2016: 115lbs  2017 111 lbs  2018: 103lbs  2019: 98 lbs  2020: 90 lbs   -Continue bentyl and PPI  - stable for discharge

## 2020-03-10 ENCOUNTER — TELEPHONE (OUTPATIENT)
Dept: SURGERY | Facility: CLINIC | Age: 60
End: 2020-03-10

## 2020-06-24 ENCOUNTER — TELEPHONE (OUTPATIENT)
Dept: GASTROENTEROLOGY | Facility: CLINIC | Age: 60
End: 2020-06-24

## 2020-06-24 NOTE — TELEPHONE ENCOUNTER
Angie Rodriguez from phone staff sent a message stating  pt requesting sooner appt. Left vm for pt informing her that her appt was rescheduled to 07/20/20 at 8AM with VANESSA Narayan.

## 2020-07-20 ENCOUNTER — LAB VISIT (OUTPATIENT)
Dept: LAB | Facility: HOSPITAL | Age: 60
End: 2020-07-20
Attending: INTERNAL MEDICINE
Payer: MEDICARE

## 2020-07-20 ENCOUNTER — TELEPHONE (OUTPATIENT)
Dept: GASTROENTEROLOGY | Facility: CLINIC | Age: 60
End: 2020-07-20

## 2020-07-20 ENCOUNTER — OFFICE VISIT (OUTPATIENT)
Dept: GASTROENTEROLOGY | Facility: CLINIC | Age: 60
End: 2020-07-20
Payer: MEDICARE

## 2020-07-20 VITALS
HEART RATE: 78 BPM | BODY MASS INDEX: 16.23 KG/M2 | DIASTOLIC BLOOD PRESSURE: 76 MMHG | SYSTOLIC BLOOD PRESSURE: 118 MMHG | WEIGHT: 88.19 LBS | HEIGHT: 62 IN

## 2020-07-20 DIAGNOSIS — Z51.81 ENCOUNTER FOR MONITORING LONG-TERM PROTON PUMP INHIBITOR THERAPY: ICD-10-CM

## 2020-07-20 DIAGNOSIS — R63.4 ABNORMAL WEIGHT LOSS: Primary | ICD-10-CM

## 2020-07-20 DIAGNOSIS — R09.89 PALPABLE ABDOMINAL AORTA: ICD-10-CM

## 2020-07-20 DIAGNOSIS — R13.10 DYSPHAGIA, UNSPECIFIED TYPE: ICD-10-CM

## 2020-07-20 DIAGNOSIS — Z11.3 SCREENING FOR STDS (SEXUALLY TRANSMITTED DISEASES): ICD-10-CM

## 2020-07-20 DIAGNOSIS — D50.9 IRON DEFICIENCY ANEMIA, UNSPECIFIED IRON DEFICIENCY ANEMIA TYPE: ICD-10-CM

## 2020-07-20 DIAGNOSIS — Z11.4 ENCOUNTER FOR SCREENING FOR HIV: ICD-10-CM

## 2020-07-20 DIAGNOSIS — R19.7 DIARRHEA, UNSPECIFIED TYPE: ICD-10-CM

## 2020-07-20 DIAGNOSIS — Z79.899 ENCOUNTER FOR MONITORING LONG-TERM PROTON PUMP INHIBITOR THERAPY: ICD-10-CM

## 2020-07-20 DIAGNOSIS — R63.4 ABNORMAL WEIGHT LOSS: ICD-10-CM

## 2020-07-20 DIAGNOSIS — K21.9 GASTROESOPHAGEAL REFLUX DISEASE, ESOPHAGITIS PRESENCE NOT SPECIFIED: ICD-10-CM

## 2020-07-20 DIAGNOSIS — R10.13 EPIGASTRIC PAIN: ICD-10-CM

## 2020-07-20 DIAGNOSIS — M32.9 SLE (SYSTEMIC LUPUS ERYTHEMATOSUS RELATED SYNDROME): ICD-10-CM

## 2020-07-20 LAB
25(OH)D3+25(OH)D2 SERPL-MCNC: 53 NG/ML (ref 30–96)
ALBUMIN SERPL BCP-MCNC: 4.1 G/DL (ref 3.5–5.2)
ALBUMIN SERPL BCP-MCNC: 4.1 G/DL (ref 3.5–5.2)
ALP SERPL-CCNC: 60 U/L (ref 55–135)
ALP SERPL-CCNC: 60 U/L (ref 55–135)
ALT SERPL W/O P-5'-P-CCNC: 14 U/L (ref 10–44)
ALT SERPL W/O P-5'-P-CCNC: 14 U/L (ref 10–44)
ANION GAP SERPL CALC-SCNC: 6 MMOL/L (ref 8–16)
ANION GAP SERPL CALC-SCNC: 6 MMOL/L (ref 8–16)
AST SERPL-CCNC: 23 U/L (ref 10–40)
AST SERPL-CCNC: 23 U/L (ref 10–40)
BASOPHILS # BLD AUTO: 0.01 K/UL (ref 0–0.2)
BASOPHILS # BLD AUTO: 0.01 K/UL (ref 0–0.2)
BASOPHILS NFR BLD: 0.4 % (ref 0–1.9)
BASOPHILS NFR BLD: 0.4 % (ref 0–1.9)
BILIRUB DIRECT SERPL-MCNC: 0.4 MG/DL (ref 0.1–0.3)
BILIRUB SERPL-MCNC: 0.9 MG/DL (ref 0.1–1)
BILIRUB SERPL-MCNC: 0.9 MG/DL (ref 0.1–1)
BUN SERPL-MCNC: 3 MG/DL (ref 6–20)
BUN SERPL-MCNC: 3 MG/DL (ref 6–20)
CALCIUM SERPL-MCNC: 9.8 MG/DL (ref 8.7–10.5)
CALCIUM SERPL-MCNC: 9.8 MG/DL (ref 8.7–10.5)
CHLORIDE SERPL-SCNC: 104 MMOL/L (ref 95–110)
CHLORIDE SERPL-SCNC: 104 MMOL/L (ref 95–110)
CO2 SERPL-SCNC: 31 MMOL/L (ref 23–29)
CO2 SERPL-SCNC: 31 MMOL/L (ref 23–29)
CORTIS SERPL-MCNC: 11 UG/DL
CREAT SERPL-MCNC: 0.7 MG/DL (ref 0.5–1.4)
CREAT SERPL-MCNC: 0.7 MG/DL (ref 0.5–1.4)
CRP SERPL-MCNC: 0.5 MG/L (ref 0–8.2)
DIFFERENTIAL METHOD: ABNORMAL
DIFFERENTIAL METHOD: ABNORMAL
EOSINOPHIL # BLD AUTO: 0 K/UL (ref 0–0.5)
EOSINOPHIL # BLD AUTO: 0 K/UL (ref 0–0.5)
EOSINOPHIL NFR BLD: 0.7 % (ref 0–8)
EOSINOPHIL NFR BLD: 0.7 % (ref 0–8)
ERYTHROCYTE [DISTWIDTH] IN BLOOD BY AUTOMATED COUNT: 12.5 % (ref 11.5–14.5)
ERYTHROCYTE [DISTWIDTH] IN BLOOD BY AUTOMATED COUNT: 12.5 % (ref 11.5–14.5)
EST. GFR  (AFRICAN AMERICAN): >60 ML/MIN/1.73 M^2
EST. GFR  (AFRICAN AMERICAN): >60 ML/MIN/1.73 M^2
EST. GFR  (NON AFRICAN AMERICAN): >60 ML/MIN/1.73 M^2
EST. GFR  (NON AFRICAN AMERICAN): >60 ML/MIN/1.73 M^2
FERRITIN SERPL-MCNC: 78 NG/ML (ref 20–300)
FERRITIN SERPL-MCNC: 78 NG/ML (ref 20–300)
GLUCOSE SERPL-MCNC: 78 MG/DL (ref 70–110)
GLUCOSE SERPL-MCNC: 78 MG/DL (ref 70–110)
HCT VFR BLD AUTO: 40.1 % (ref 37–48.5)
HCT VFR BLD AUTO: 40.1 % (ref 37–48.5)
HGB BLD-MCNC: 12.7 G/DL (ref 12–16)
HGB BLD-MCNC: 12.7 G/DL (ref 12–16)
IGA SERPL-MCNC: 216 MG/DL (ref 40–350)
IMM GRANULOCYTES # BLD AUTO: 0 K/UL (ref 0–0.04)
IMM GRANULOCYTES # BLD AUTO: 0 K/UL (ref 0–0.04)
IMM GRANULOCYTES NFR BLD AUTO: 0 % (ref 0–0.5)
IMM GRANULOCYTES NFR BLD AUTO: 0 % (ref 0–0.5)
IRON SERPL-MCNC: 126 UG/DL (ref 30–160)
IRON SERPL-MCNC: 126 UG/DL (ref 30–160)
LIPASE SERPL-CCNC: 8 U/L (ref 4–60)
LYMPHOCYTES # BLD AUTO: 1.4 K/UL (ref 1–4.8)
LYMPHOCYTES # BLD AUTO: 1.4 K/UL (ref 1–4.8)
LYMPHOCYTES NFR BLD: 50.2 % (ref 18–48)
LYMPHOCYTES NFR BLD: 50.2 % (ref 18–48)
MAGNESIUM SERPL-MCNC: 1.8 MG/DL (ref 1.6–2.6)
MCH RBC QN AUTO: 30.2 PG (ref 27–31)
MCH RBC QN AUTO: 30.2 PG (ref 27–31)
MCHC RBC AUTO-ENTMCNC: 31.7 G/DL (ref 32–36)
MCHC RBC AUTO-ENTMCNC: 31.7 G/DL (ref 32–36)
MCV RBC AUTO: 95 FL (ref 82–98)
MCV RBC AUTO: 95 FL (ref 82–98)
MONOCYTES # BLD AUTO: 0.2 K/UL (ref 0.3–1)
MONOCYTES # BLD AUTO: 0.2 K/UL (ref 0.3–1)
MONOCYTES NFR BLD: 8.7 % (ref 4–15)
MONOCYTES NFR BLD: 8.7 % (ref 4–15)
NEUTROPHILS # BLD AUTO: 1.1 K/UL (ref 1.8–7.7)
NEUTROPHILS # BLD AUTO: 1.1 K/UL (ref 1.8–7.7)
NEUTROPHILS NFR BLD: 40 % (ref 38–73)
NEUTROPHILS NFR BLD: 40 % (ref 38–73)
NRBC BLD-RTO: 0 /100 WBC
NRBC BLD-RTO: 0 /100 WBC
PLATELET # BLD AUTO: 271 K/UL (ref 150–350)
PLATELET # BLD AUTO: 271 K/UL (ref 150–350)
PMV BLD AUTO: 10.2 FL (ref 9.2–12.9)
PMV BLD AUTO: 10.2 FL (ref 9.2–12.9)
POTASSIUM SERPL-SCNC: 3.8 MMOL/L (ref 3.5–5.1)
POTASSIUM SERPL-SCNC: 3.8 MMOL/L (ref 3.5–5.1)
PROT SERPL-MCNC: 7.9 G/DL (ref 6–8.4)
PROT SERPL-MCNC: 7.9 G/DL (ref 6–8.4)
RBC # BLD AUTO: 4.21 M/UL (ref 4–5.4)
RBC # BLD AUTO: 4.21 M/UL (ref 4–5.4)
SATURATED IRON: 30 % (ref 20–50)
SATURATED IRON: 30 % (ref 20–50)
SODIUM SERPL-SCNC: 141 MMOL/L (ref 136–145)
SODIUM SERPL-SCNC: 141 MMOL/L (ref 136–145)
TOTAL IRON BINDING CAPACITY: 425 UG/DL (ref 250–450)
TOTAL IRON BINDING CAPACITY: 425 UG/DL (ref 250–450)
TRANSFERRIN SERPL-MCNC: 287 MG/DL (ref 200–375)
TRANSFERRIN SERPL-MCNC: 287 MG/DL (ref 200–375)
TSH SERPL DL<=0.005 MIU/L-ACNC: 0.95 UIU/ML (ref 0.4–4)
VIT B12 SERPL-MCNC: >2000 PG/ML (ref 210–950)
WBC # BLD AUTO: 2.77 K/UL (ref 3.9–12.7)
WBC # BLD AUTO: 2.77 K/UL (ref 3.9–12.7)

## 2020-07-20 PROCEDURE — 83735 ASSAY OF MAGNESIUM: CPT

## 2020-07-20 PROCEDURE — 82607 VITAMIN B-12: CPT

## 2020-07-20 PROCEDURE — 86592 SYPHILIS TEST NON-TREP QUAL: CPT

## 2020-07-20 PROCEDURE — 99215 PR OFFICE/OUTPT VISIT, EST, LEVL V, 40-54 MIN: ICD-10-PCS | Mod: S$GLB,,, | Performed by: INTERNAL MEDICINE

## 2020-07-20 PROCEDURE — 99999 PR PBB SHADOW E&M-EST. PATIENT-LVL III: ICD-10-PCS | Mod: PBBFAC,,, | Performed by: INTERNAL MEDICINE

## 2020-07-20 PROCEDURE — 80053 COMPREHEN METABOLIC PANEL: CPT

## 2020-07-20 PROCEDURE — 80074 ACUTE HEPATITIS PANEL: CPT

## 2020-07-20 PROCEDURE — 86703 HIV-1/HIV-2 1 RESULT ANTBDY: CPT

## 2020-07-20 PROCEDURE — 85025 COMPLETE CBC W/AUTO DIFF WBC: CPT

## 2020-07-20 PROCEDURE — 36415 COLL VENOUS BLD VENIPUNCTURE: CPT

## 2020-07-20 PROCEDURE — 99999 PR PBB SHADOW E&M-EST. PATIENT-LVL III: CPT | Mod: PBBFAC,,, | Performed by: INTERNAL MEDICINE

## 2020-07-20 PROCEDURE — 84443 ASSAY THYROID STIM HORMONE: CPT

## 2020-07-20 PROCEDURE — 83516 IMMUNOASSAY NONANTIBODY: CPT

## 2020-07-20 PROCEDURE — 82784 ASSAY IGA/IGD/IGG/IGM EACH: CPT

## 2020-07-20 PROCEDURE — 3008F BODY MASS INDEX DOCD: CPT | Mod: CPTII,S$GLB,, | Performed by: INTERNAL MEDICINE

## 2020-07-20 PROCEDURE — 86706 HEP B SURFACE ANTIBODY: CPT

## 2020-07-20 PROCEDURE — 99215 OFFICE O/P EST HI 40 MIN: CPT | Mod: S$GLB,,, | Performed by: INTERNAL MEDICINE

## 2020-07-20 PROCEDURE — 82728 ASSAY OF FERRITIN: CPT

## 2020-07-20 PROCEDURE — 3008F PR BODY MASS INDEX (BMI) DOCUMENTED: ICD-10-PCS | Mod: CPTII,S$GLB,, | Performed by: INTERNAL MEDICINE

## 2020-07-20 PROCEDURE — 83690 ASSAY OF LIPASE: CPT

## 2020-07-20 PROCEDURE — 86790 VIRUS ANTIBODY NOS: CPT

## 2020-07-20 PROCEDURE — 82306 VITAMIN D 25 HYDROXY: CPT

## 2020-07-20 PROCEDURE — 86704 HEP B CORE ANTIBODY TOTAL: CPT

## 2020-07-20 PROCEDURE — 83540 ASSAY OF IRON: CPT

## 2020-07-20 PROCEDURE — 82533 TOTAL CORTISOL: CPT

## 2020-07-20 PROCEDURE — 86140 C-REACTIVE PROTEIN: CPT

## 2020-07-20 RX ORDER — CYANOCOBALAMIN 1000 UG/ML
INJECTION, SOLUTION INTRAMUSCULAR; SUBCUTANEOUS
COMMUNITY
Start: 2020-07-12

## 2020-07-20 RX ORDER — DICYCLOMINE HYDROCHLORIDE 20 MG/1
20 TABLET ORAL EVERY 12 HOURS PRN
Qty: 60 TABLET | Refills: 0 | Status: SHIPPED | OUTPATIENT
Start: 2020-07-20 | End: 2020-08-19

## 2020-07-20 RX ORDER — LIDOCAINE HYDROCHLORIDE 20 MG/ML
SOLUTION OROPHARYNGEAL
COMMUNITY
Start: 2020-06-19
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
## 2020-07-21 LAB
HAV IGM SERPL QL IA: NEGATIVE
HBV CORE AB SERPL QL IA: NEGATIVE
HBV CORE IGM SERPL QL IA: NEGATIVE
HBV SURFACE AG SERPL QL IA: NEGATIVE
HCV AB SERPL QL IA: NEGATIVE
HEPATITIS A ANTIBODY, IGG: NEGATIVE
HIV 1+2 AB+HIV1 P24 AG SERPL QL IA: NEGATIVE
RPR SER QL: NORMAL

## 2020-07-23 DIAGNOSIS — Z01.818 PRE-OP TESTING: ICD-10-CM

## 2020-07-23 DIAGNOSIS — Z12.11 SPECIAL SCREENING FOR MALIGNANT NEOPLASMS, COLON: Primary | ICD-10-CM

## 2020-07-23 DIAGNOSIS — D64.9 ANEMIA, UNSPECIFIED TYPE: ICD-10-CM

## 2020-07-23 LAB
HBV SURFACE AB SER QL IA: NEGATIVE
HBV SURFACE AB SERPL IA-ACNC: 9 MIU/ML
TTG IGA SER-ACNC: 5 UNITS

## 2020-07-23 RX ORDER — POLYETHYLENE GLYCOL 3350, SODIUM SULFATE ANHYDROUS, SODIUM BICARBONATE, SODIUM CHLORIDE, POTASSIUM CHLORIDE 236; 22.74; 6.74; 5.86; 2.97 G/4L; G/4L; G/4L; G/4L; G/4L
4 POWDER, FOR SOLUTION ORAL ONCE
Qty: 4000 ML | Refills: 0 | Status: SHIPPED | OUTPATIENT
Start: 2020-07-23 | End: 2020-07-23

## 2020-07-24 ENCOUNTER — HOSPITAL ENCOUNTER (OUTPATIENT)
Dept: RADIOLOGY | Facility: OTHER | Age: 60
Discharge: HOME | End: 2020-07-24
Attending: INTERNAL MEDICINE
Payer: MEDICARE

## 2020-07-24 DIAGNOSIS — R09.89 PALPABLE ABDOMINAL AORTA: ICD-10-CM

## 2020-07-24 DIAGNOSIS — R63.4 ABNORMAL WEIGHT LOSS: ICD-10-CM

## 2020-07-24 DIAGNOSIS — R10.13 EPIGASTRIC PAIN: ICD-10-CM

## 2020-07-24 PROCEDURE — 74177 CT ABDOMEN PELVIS WITH CONTRAST: ICD-10-PCS | Mod: 26,,, | Performed by: RADIOLOGY

## 2020-07-24 PROCEDURE — 74177 CT ABD & PELVIS W/CONTRAST: CPT | Mod: TC

## 2020-07-24 PROCEDURE — 74177 CT ABD & PELVIS W/CONTRAST: CPT | Mod: 26,,, | Performed by: RADIOLOGY

## 2020-07-24 PROCEDURE — A9698 NON-RAD CONTRAST MATERIALNOC: HCPCS | Performed by: INTERNAL MEDICINE

## 2020-07-24 PROCEDURE — 25500020 PHARM REV CODE 255: Performed by: INTERNAL MEDICINE

## 2020-07-24 RX ADMIN — IOHEXOL 25 ML: 350 INJECTION, SOLUTION INTRAVENOUS at 01:07

## 2020-07-24 RX ADMIN — IOHEXOL 1000 ML: 9 SOLUTION ORAL at 12:07

## 2020-07-24 RX ADMIN — IOHEXOL 75 ML: 350 INJECTION, SOLUTION INTRAVENOUS at 01:07

## 2020-07-30 DIAGNOSIS — R11.0 NAUSEA: Primary | ICD-10-CM

## 2020-07-30 RX ORDER — ONDANSETRON 4 MG/1
4 TABLET, ORALLY DISINTEGRATING ORAL EVERY 12 HOURS PRN
Qty: 60 TABLET | Refills: 0 | Status: SHIPPED | OUTPATIENT
Start: 2020-07-30 | End: 2020-08-29

## 2020-08-02 DIAGNOSIS — R19.7 DIARRHEA, UNSPECIFIED TYPE: Primary | ICD-10-CM

## 2020-08-05 ENCOUNTER — TELEPHONE (OUTPATIENT)
Dept: GASTROENTEROLOGY | Facility: CLINIC | Age: 60
End: 2020-08-05

## 2020-08-05 NOTE — TELEPHONE ENCOUNTER
"----- Message from Cornell Cobb MD sent at 8/2/2020 10:38 AM CDT -----  Blanca  please tell patient that their Hepatitis A, B and C labs are negative but they have "No" immunity to them either.      There is currently No vaccination yet for Hepatitis C.    There is vaccinations for Hepatitis A and B,  and Recommend the Hepatitis A and B vaccination series.        Hepatitis A vaccination series is a 2 part vaccine series - Day 1, and then again in 6-months from first one.    Hepatitis B vaccination series is a 2 part vaccine series - Day 1, and then again in 1-month from the first one.      Orders were  placed.  "

## 2020-08-05 NOTE — TELEPHONE ENCOUNTER
Called and spoke to pt.  Pt verbalized understanding of results.  Pt accepted appt and appreciated the call.

## 2020-08-09 ENCOUNTER — LAB VISIT (OUTPATIENT)
Dept: URGENT CARE | Facility: CLINIC | Age: 60
End: 2020-08-09
Payer: MEDICARE

## 2020-08-09 VITALS — TEMPERATURE: 98 F

## 2020-08-09 DIAGNOSIS — Z01.818 PRE-OP TESTING: ICD-10-CM

## 2020-08-09 PROCEDURE — 99211 PR OFFICE/OUTPT VISIT, EST, LEVL I: ICD-10-PCS | Mod: S$GLB,,, | Performed by: NURSE PRACTITIONER

## 2020-08-09 PROCEDURE — 99211 OFF/OP EST MAY X REQ PHY/QHP: CPT | Mod: S$GLB,,, | Performed by: NURSE PRACTITIONER

## 2020-08-09 PROCEDURE — U0003 INFECTIOUS AGENT DETECTION BY NUCLEIC ACID (DNA OR RNA); SEVERE ACUTE RESPIRATORY SYNDROME CORONAVIRUS 2 (SARS-COV-2) (CORONAVIRUS DISEASE [COVID-19]), AMPLIFIED PROBE TECHNIQUE, MAKING USE OF HIGH THROUGHPUT TECHNOLOGIES AS DESCRIBED BY CMS-2020-01-R: HCPCS

## 2020-08-10 LAB — SARS-COV-2 RNA RESP QL NAA+PROBE: NOT DETECTED

## 2020-08-11 ENCOUNTER — TELEPHONE (OUTPATIENT)
Dept: GASTROENTEROLOGY | Facility: CLINIC | Age: 60
End: 2020-08-11

## 2020-08-11 DIAGNOSIS — R19.7 DIARRHEA, UNSPECIFIED TYPE: Primary | ICD-10-CM

## 2020-08-11 NOTE — TELEPHONE ENCOUNTER
----- Message from Eneida De Dios sent at 8/11/2020  3:24 PM CDT -----  [place] with [facility]#[phone]    She states that pt dropped off stool sample. She states that they have orders from June and if you want a duplicate test done, they need up to date orders.

## 2020-08-12 ENCOUNTER — HOSPITAL ENCOUNTER (OUTPATIENT)
Facility: HOSPITAL | Age: 60
Discharge: HOME | End: 2020-08-12
Attending: INTERNAL MEDICINE | Admitting: INTERNAL MEDICINE
Payer: MEDICARE

## 2020-08-12 ENCOUNTER — ANESTHESIA EVENT (OUTPATIENT)
Dept: ENDOSCOPY | Facility: HOSPITAL | Age: 60
End: 2020-08-12
Payer: MEDICARE

## 2020-08-12 ENCOUNTER — ANESTHESIA (OUTPATIENT)
Dept: ENDOSCOPY | Facility: HOSPITAL | Age: 60
End: 2020-08-12
Payer: MEDICARE

## 2020-08-12 VITALS
WEIGHT: 90 LBS | SYSTOLIC BLOOD PRESSURE: 124 MMHG | HEIGHT: 62 IN | DIASTOLIC BLOOD PRESSURE: 59 MMHG | RESPIRATION RATE: 16 BRPM | HEART RATE: 89 BPM | TEMPERATURE: 98 F | OXYGEN SATURATION: 100 % | BODY MASS INDEX: 16.56 KG/M2

## 2020-08-12 DIAGNOSIS — R19.7 DIARRHEA: ICD-10-CM

## 2020-08-12 PROCEDURE — E9220 PRA ENDO ANESTHESIA: ICD-10-PCS | Mod: ,,, | Performed by: NURSE ANESTHETIST, CERTIFIED REGISTERED

## 2020-08-12 PROCEDURE — 82657 ENZYME CELL ACTIVITY: CPT | Performed by: PATHOLOGY

## 2020-08-12 PROCEDURE — 43239 PR EGD, FLEX, W/BIOPSY, SGL/MULTI: ICD-10-PCS | Mod: 51,,, | Performed by: INTERNAL MEDICINE

## 2020-08-12 PROCEDURE — 88305 TISSUE EXAM BY PATHOLOGIST: ICD-10-PCS | Mod: 26,,, | Performed by: PATHOLOGY

## 2020-08-12 PROCEDURE — 43239 EGD BIOPSY SINGLE/MULTIPLE: CPT | Mod: 51,,, | Performed by: INTERNAL MEDICINE

## 2020-08-12 PROCEDURE — 25000003 PHARM REV CODE 250: Performed by: INTERNAL MEDICINE

## 2020-08-12 PROCEDURE — 25000003 PHARM REV CODE 250: Performed by: NURSE ANESTHETIST, CERTIFIED REGISTERED

## 2020-08-12 PROCEDURE — E9220 PRA ENDO ANESTHESIA: HCPCS | Mod: ,,, | Performed by: NURSE ANESTHETIST, CERTIFIED REGISTERED

## 2020-08-12 PROCEDURE — 27201012 HC FORCEPS, HOT/COLD, DISP: Performed by: INTERNAL MEDICINE

## 2020-08-12 PROCEDURE — 37000008 HC ANESTHESIA 1ST 15 MINUTES: Performed by: INTERNAL MEDICINE

## 2020-08-12 PROCEDURE — 88305 TISSUE EXAM BY PATHOLOGIST: CPT | Mod: 26,,, | Performed by: PATHOLOGY

## 2020-08-12 PROCEDURE — 45380 COLONOSCOPY AND BIOPSY: CPT | Performed by: INTERNAL MEDICINE

## 2020-08-12 PROCEDURE — 88305 TISSUE EXAM BY PATHOLOGIST: CPT | Mod: 59 | Performed by: PATHOLOGY

## 2020-08-12 PROCEDURE — 45380 COLONOSCOPY AND BIOPSY: CPT | Mod: ,,, | Performed by: INTERNAL MEDICINE

## 2020-08-12 PROCEDURE — 63600175 PHARM REV CODE 636 W HCPCS: Performed by: NURSE ANESTHETIST, CERTIFIED REGISTERED

## 2020-08-12 PROCEDURE — 45380 PR COLONOSCOPY,BIOPSY: ICD-10-PCS | Mod: ,,, | Performed by: INTERNAL MEDICINE

## 2020-08-12 PROCEDURE — 37000009 HC ANESTHESIA EA ADD 15 MINS: Performed by: INTERNAL MEDICINE

## 2020-08-12 PROCEDURE — 43239 EGD BIOPSY SINGLE/MULTIPLE: CPT | Performed by: INTERNAL MEDICINE

## 2020-08-12 RX ORDER — PROPOFOL 10 MG/ML
INJECTION, EMULSION INTRAVENOUS CONTINUOUS PRN
Status: DISCONTINUED | OUTPATIENT
Start: 2020-08-12 | End: 2020-08-12

## 2020-08-12 RX ORDER — PROPOFOL 10 MG/ML
INJECTION, EMULSION INTRAVENOUS
Status: DISCONTINUED | OUTPATIENT
Start: 2020-08-12 | End: 2020-08-12

## 2020-08-12 RX ORDER — LIDOCAINE HCL/PF 100 MG/5ML
SYRINGE (ML) INTRAVENOUS
Status: DISCONTINUED | OUTPATIENT
Start: 2020-08-12 | End: 2020-08-12

## 2020-08-12 RX ORDER — GLYCOPYRROLATE 0.2 MG/ML
INJECTION INTRAMUSCULAR; INTRAVENOUS
Status: DISCONTINUED | OUTPATIENT
Start: 2020-08-12 | End: 2020-08-12

## 2020-08-12 RX ORDER — SODIUM CHLORIDE 9 MG/ML
INJECTION, SOLUTION INTRAVENOUS CONTINUOUS
Status: DISCONTINUED | OUTPATIENT
Start: 2020-08-12 | End: 2020-08-12 | Stop reason: HOSPADM

## 2020-08-12 RX ADMIN — Medication 60 MG: at 03:08

## 2020-08-12 RX ADMIN — SODIUM CHLORIDE: 0.9 INJECTION, SOLUTION INTRAVENOUS at 02:08

## 2020-08-12 RX ADMIN — GLYCOPYRROLATE 0.2 MG: 0.2 INJECTION, SOLUTION INTRAMUSCULAR; INTRAVENOUS at 03:08

## 2020-08-12 RX ADMIN — PROPOFOL 200 MCG/KG/MIN: 10 INJECTION, EMULSION INTRAVENOUS at 03:08

## 2020-08-12 RX ADMIN — SODIUM CHLORIDE: 0.9 INJECTION, SOLUTION INTRAVENOUS at 03:08

## 2020-08-12 RX ADMIN — PROPOFOL 30 MG: 10 INJECTION, EMULSION INTRAVENOUS at 03:08

## 2020-08-12 RX ADMIN — PROPOFOL 70 MG: 10 INJECTION, EMULSION INTRAVENOUS at 03:08

## 2020-08-12 NOTE — TRANSFER OF CARE
"Anesthesia Transfer of Care Note    Patient: Luciana Berger    Procedure(s) Performed: Procedure(s) (LRB):  EGD (ESOPHAGOGASTRODUODENOSCOPY) (N/A)  COLONOSCOPY (N/A)    Patient location: GI    Anesthesia Type: general    Transport from OR: Transported from OR on room air with adequate spontaneous ventilation    Post pain: adequate analgesia    Post assessment: no apparent anesthetic complications and tolerated procedure well    Post vital signs: stable    Level of consciousness: awake    Nausea/Vomiting: no nausea/vomiting    Complications: none    Transfer of care protocol was followed      Last vitals:   Visit Vitals  /71 (BP Location: Left arm, Patient Position: Lying)   Pulse 90   Temp 36.7 °C (98.1 °F) (Temporal)   Resp 18   Ht 5' 2" (1.575 m)   Wt 40.8 kg (90 lb)   LMP 10/24/2013   SpO2 97%   Breastfeeding No   BMI 16.46 kg/m²     "

## 2020-08-12 NOTE — ANESTHESIA POSTPROCEDURE EVALUATION
Anesthesia Post Evaluation    Patient: Luciana Berger    Procedure(s) Performed: Procedure(s) (LRB):  EGD (ESOPHAGOGASTRODUODENOSCOPY) (N/A)  COLONOSCOPY (N/A)    Final Anesthesia Type: general    Patient location during evaluation: GI PACU  Patient participation: Yes- Able to Participate  Level of consciousness: awake and alert and oriented  Post-procedure vital signs: reviewed and stable  Pain management: adequate  Airway patency: patent    PONV status at discharge: No PONV  Anesthetic complications: no      Cardiovascular status: blood pressure returned to baseline and hemodynamically stable  Respiratory status: unassisted, spontaneous ventilation and room air  Hydration status: euvolemic  Follow-up not needed.          Vitals Value Taken Time   /57 08/12/20 1632   Temp 36.4 °C (97.5 °F) 08/12/20 1617   Pulse 89 08/12/20 1632   Resp 16 08/12/20 1632   SpO2 100 % 08/12/20 1632         No case tracking events are documented in the log.      Pain/Cecil Score: Cecil Score: 9 (8/12/2020  4:32 PM)

## 2020-08-12 NOTE — [employer] PATIENT INSTRUCTIONS
Discharge Summary/Instructions after an Endoscopic Procedure  Patient Name: Luciana Berger  Patient MRN: [id number]  Patient YOB: 1960  Wednesday, August 12, 2020  Cornell Cobb MD  RESTRICTIONS:  During your procedure today, you received medications for sedation.  These   medications may affect your judgment, balance and coordination.  Therefore,   for 24 hours, you have the following restrictions:   - DO NOT drive a car, operate machinery, make legal/financial decisions,   sign important papers or drink alcohol.    ACTIVITY:  Today: no heavy lifting, straining or running due to procedural   sedation/anesthesia.  The following day: return to full activity including work.  DIET:  Eat and drink normally unless instructed otherwise.     TREATMENT FOR COMMON SIDE EFFECTS:  - Mild abdominal pain, nausea, belching, bloating or excessive gas:  rest,   eat lightly and use a heating pad.  - Sore Throat: treat with throat lozenges and/or gargle with warm salt   water.  - Because air was used during the procedure, expelling large amounts of air   from your rectum or belching is normal.  - If a bowel prep was taken, you may not have a bowel movement for 1-3 days.    This is normal.  SYMPTOMS TO WATCH FOR AND REPORT TO YOUR PHYSICIAN:  1. Abdominal pain or bloating, other than gas cramps.  2. Chest pain.  3. Back pain.  4. Signs of infection such as: chills or fever occurring within 24 hours   after the procedure.  5. Rectal bleeding, which would show as bright red, maroon, or black stools.   (A tablespoon of blood from the rectum is not serious, especially if   hemorrhoids are present.)  6. Vomiting.  7. Weakness or dizziness.  GO DIRECTLY TO THE NEAREST EMERGENCY ROOM IF YOU HAVE ANY OF THE FOLLOWING:      Difficulty breathing              Chills and/or fever over 101 F   Persistent vomiting and/or vomiting blood   Severe abdominal pain   Severe chest pain   Black, tarry stools   Bleeding- more than one  tablespoon   Any other symptom or condition that you feel may need urgent attention  Your doctor recommends these additional instructions:  If any biopsies were taken, your doctors clinic will contact you in 1 to 2   weeks with any results.  - Discharge patient to home.   - Await pathology results.   - Telephone endoscopist for pathology results in 2 weeks.   - Return to primary care physician.   - Return to GI clinic.   - Repeat colonoscopy in 10 years for screening purposes.   - THIS RECOMMENDATION of 10-Years FOR NEXT SCREENING COLONOSCOPY ASSUMES   THAT YOU WILL NOT HAVE HAD OR NOT HAD A FIRST OR SECOND DEGREE RELATIVE/S   WITH COLORECTAL CANCER OR AN ADVANCE COLON ADENOMAS BEFORE THE AGE OF 60   YEARS OF AGE OF THOSE INDIVIDUALS BY THE TIME OF YOUR NEXT SCREENING   COLONOSCOPY.                - The findings and recommendations were discussed with the patient.  For questions, problems or results please call your physician - Cornell Cobb MD at Work:  [phone].  [facility], EMERGENCY ROOM PHONE NUMBER: [phone]  IF A COMPLICATION OR EMERGENCY SITUATION ARISES AND YOU ARE UNABLE TO REACH   YOUR PHYSICIAN - GO DIRECTLY TO THE EMERGENCY ROOM.  Cornell Cobb MD  8/12/2020 4:20:38 PM  This report has been verified and signed electronically.  [employer]

## 2020-08-12 NOTE — H&P
[facility] [address] [facility]  History & Physical    Subjective:      Chief Complaint/Reason for Admission:   EGD and colonoscopy    Luciana Berger is a 59 y.o. female.    Past Medical History:   Diagnosis Date    Anxiety     Arthritis     GERD (gastroesophageal reflux disease)     Neuromuscular disorder      Past Surgical History:   Procedure Laterality Date    left breast surgery in 1989 Left      Family History   Problem Relation Age of Onset    Cancer Mother     Heart disease Father     Celiac disease Neg Hx     Cirrhosis Neg Hx     Colon cancer Neg Hx     Colon polyps Neg Hx     Crohn's disease Neg Hx     Esophageal cancer Neg Hx     Inflammatory bowel disease Neg Hx     Liver cancer Neg Hx     Liver disease Neg Hx     Rectal cancer Neg Hx     Stomach cancer Neg Hx     Ulcerative colitis Neg Hx     Juan's disease Neg Hx     Lymphoma Neg Hx      Social History     Tobacco Use    Smoking status: Never Smoker   Substance Use Topics    Alcohol use: No    Drug use: No       PTA Medications   Medication Sig    cyanocobalamin 1,000 mcg/mL injection INJECT 1 ML INTO THE SKIN ONCE A WEEK    dicyclomine (BENTYL) 20 mg tablet Take 1 tablet (20 mg total) by mouth every 12 (twelve) hours as needed (Abdominal cramp).    ferrous sulfate (FEOSOL) 325 mg (65 mg iron) Tab tablet Take 1 tablet (325 mg total) by mouth once daily.    hydroxychloroquine (PLAQUENIL) 200 mg tablet Take 200 mg by mouth once daily.    LORazepam (ATIVAN) 1 MG tablet Take 1 mg by mouth 2 (two) times daily.    methocarbamol (ROBAXIN) 500 MG Tab Take 500 mg by mouth 3 (three) times daily as needed.    ondansetron (ZOFRAN-ODT) 4 MG TbDL Take 1 tablet (4 mg total) by mouth every 12 (twelve) hours as needed (Nausea).    oxycodone-acetaminophen (PERCOCET) 5-325 mg per tablet Take 1 tablet by mouth every 4 (four) hours as needed for Pain.    pantoprazole (PROTONIX) 20 MG tablet Take 20 mg by mouth 2 (two) times daily before  meals.    sotalol (BETAPACE) 80 MG tablet Take 80 mg by mouth 2 (two) times daily.    zolpidem (AMBIEN) 10 mg Tab Take 10 mg by mouth nightly as needed.    lidocaine HCl 2% (XYLOCAINE) 2 % Soln GARGLE AND SPIT 1 TEASPOON EVERY 6 HOURS AS NEEDED FOR SORE THROAT     Review of patient's allergies indicates:  No Known Allergies     Review of Systems   Constitutional: Positive for weight loss. Negative for chills and fever.   Respiratory: Negative for shortness of breath.    Cardiovascular: Negative for chest pain.   Gastrointestinal: Positive for diarrhea.       Objective:      Vital Signs (Most Recent)  Temp: 98.1 °F (36.7 °C) (08/12/20 1444)  Pulse: 90 (08/12/20 1444)  Resp: 18 (08/12/20 1444)  BP: 134/71 (08/12/20 1444)  SpO2: 97 % (08/12/20 1444)    Vital Signs Range (Last 24H):  Temp:  [98.1 °F (36.7 °C)]   Pulse:  [90]   Resp:  [18]   BP: (134)/(71)   SpO2:  [97 %]     Physical Exam  Cardiovascular:      Rate and Rhythm: Normal rate.   Pulmonary:      Effort: Pulmonary effort is normal.   Neurological:      Mental Status: She is alert and oriented to person, place, and time.   Psychiatric:         Mood and Affect: Mood normal.         Behavior: Behavior normal.         Thought Content: Thought content normal.         Judgment: Judgment normal.              Assessment:      Active Hospital Problems    Diagnosis  POA    Diarrhea [R19.7]  Yes      Resolved Hospital Problems   No resolved problems to display.       Plan:    EGD and colonoscopy for weight loss diarrhea history of iron deficiency anemia.

## 2020-08-12 NOTE — DISCHARGE INSTRUCTIONS

## 2020-08-12 NOTE — ANESTHESIA PREPROCEDURE EVALUATION
08/12/2020  Luciana Berger is a 59 y.o., female.    Anesthesia Evaluation    I have reviewed the Patient Summary Reports.   I have reviewed the NPO Status.   I have reviewed the Medications.     Review of Systems  Anesthesia Hx:  No problems with previous Anesthesia   Denies Personal Hx of Anesthesia complications.   Social:  Non-Smoker    Hematology/Oncology:  Hematology Normal   Oncology Normal     EENT/Dental:EENT/Dental Normal   Cardiovascular:  Cardiovascular Normal Exercise tolerance: good  ECG has been reviewed.    Pulmonary:  Pulmonary Normal    Renal/:  Renal/ Normal     Hepatic/GI:   Bowel Prep. GERD    Musculoskeletal:  Musculoskeletal Normal    Endocrine:  Endocrine Normal    Dermatological:  Skin Normal    Psych:  Psychiatric Normal           Physical Exam  General:  Well nourished    Airway/Jaw/Neck:  Airway Findings: Mouth Opening: Normal Mallampati: I  TM Distance: Normal, at least 6 cm  Jaw/Neck Findings:  Neck ROM: Normal ROM      Dental:  Dental Findings: In tact   Chest/Lungs:  Chest/Lungs Findings: Clear to auscultation, Normal Respiratory Rate     Heart/Vascular:  Heart Findings: Rate: Normal  Rhythm: Regular Rhythm  Sounds: Normal  Heart murmur: negative       Mental Status:  Mental Status Findings: Normal        Anesthesia Plan  Type of Anesthesia, risks & benefits discussed:  Anesthesia Type:  general  Patient's Preference:   Intra-op Monitoring Plan: standard ASA monitors  Intra-op Monitoring Plan Comments:   Post Op Pain Control Plan: IV/PO Opioids PRN  Post Op Pain Control Plan Comments:   Induction:   IV  Beta Blocker:  Patient is not currently on a Beta-Blocker (No further documentation required).       Informed Consent: Patient understands risks and agrees with Anesthesia plan.  Questions answered. Anesthesia consent signed with patient.  ASA Score: 1     Day of Surgery  Review of History & Physical:    H&P update referred to the provider.         Ready For Surgery From Anesthesia Perspective.

## 2020-08-12 NOTE — [employer] PATIENT INSTRUCTIONS
Discharge Summary/Instructions after an Endoscopic Procedure  Patient Name: Luciana Berger  Patient MRN: [id number]  Patient YOB: 1960  Wednesday, August 12, 2020  Cornell Cobb MD  RESTRICTIONS:  During your procedure today, you received medications for sedation.  These   medications may affect your judgment, balance and coordination.  Therefore,   for 24 hours, you have the following restrictions:   - DO NOT drive a car, operate machinery, make legal/financial decisions,   sign important papers or drink alcohol.    ACTIVITY:  Today: no heavy lifting, straining or running due to procedural   sedation/anesthesia.  The following day: return to full activity including work.  DIET:  Eat and drink normally unless instructed otherwise.     TREATMENT FOR COMMON SIDE EFFECTS:  - Mild abdominal pain, nausea, belching, bloating or excessive gas:  rest,   eat lightly and use a heating pad.  - Sore Throat: treat with throat lozenges and/or gargle with warm salt   water.  - Because air was used during the procedure, expelling large amounts of air   from your rectum or belching is normal.  - If a bowel prep was taken, you may not have a bowel movement for 1-3 days.    This is normal.  SYMPTOMS TO WATCH FOR AND REPORT TO YOUR PHYSICIAN:  1. Abdominal pain or bloating, other than gas cramps.  2. Chest pain.  3. Back pain.  4. Signs of infection such as: chills or fever occurring within 24 hours   after the procedure.  5. Rectal bleeding, which would show as bright red, maroon, or black stools.   (A tablespoon of blood from the rectum is not serious, especially if   hemorrhoids are present.)  6. Vomiting.  7. Weakness or dizziness.  GO DIRECTLY TO THE NEAREST EMERGENCY ROOM IF YOU HAVE ANY OF THE FOLLOWING:      Difficulty breathing              Chills and/or fever over 101 F   Persistent vomiting and/or vomiting blood   Severe abdominal pain   Severe chest pain   Black, tarry stools   Bleeding- more than one  tablespoon   Any other symptom or condition that you feel may need urgent attention  Your doctor recommends these additional instructions:  If any biopsies were taken, your doctors clinic will contact you in 1 to 2   weeks with any results.  - Discharge patient to home.   - Await pathology results.   - Telephone endoscopist for pathology results in 2 weeks.   - Return to GI clinic.   - The findings and recommendations were discussed with the patient.   - Return to primary care physician.  For questions, problems or results please call your physician - Cornell Cobb MD at Work:  [phone].  [facility], EMERGENCY ROOM PHONE NUMBER: [phone]  IF A COMPLICATION OR EMERGENCY SITUATION ARISES AND YOU ARE UNABLE TO REACH   YOUR PHYSICIAN - GO DIRECTLY TO THE EMERGENCY ROOM.  Cornell Cobb MD  8/12/2020 4:35:39 PM  This report has been verified and signed electronically.  [employer]

## 2020-08-13 NOTE — TELEPHONE ENCOUNTER
Called and spoke to pt.  Informed pt of stool needing to be recollected.   Pt says she will  on 08/14.    Informed pt message will be sent to provider.  Pt appreciated the call.

## 2020-08-18 LAB
FINAL PATHOLOGIC DIAGNOSIS: NORMAL
GROSS: NORMAL

## 2020-08-21 ENCOUNTER — TELEPHONE (OUTPATIENT)
Dept: GASTROENTEROLOGY | Facility: CLINIC | Age: 60
End: 2020-08-21

## 2020-08-21 NOTE — TELEPHONE ENCOUNTER
----- Message from Mariana Rogers sent at 8/21/2020  8:55 AM CDT -----  Luciana Clayton calling regarding Results  (message) for EGD done on 8/12. she also stated she was to be schedule for an appt but nothing is schedule. She also need a stool sample kit. [phone]

## 2020-08-24 LAB
FINAL PATHOLOGIC DIAGNOSIS: NORMAL
GROSS: NORMAL

## 2020-08-27 ENCOUNTER — TELEPHONE (OUTPATIENT)
Dept: GASTROENTEROLOGY | Facility: CLINIC | Age: 60
End: 2020-08-27

## 2020-08-27 DIAGNOSIS — D50.9 IRON DEFICIENCY ANEMIA, UNSPECIFIED IRON DEFICIENCY ANEMIA TYPE: Primary | ICD-10-CM

## 2020-08-27 NOTE — TELEPHONE ENCOUNTER
----- Message from Cornell Cobb MD sent at 8/27/2020  1:35 AM CDT -----  VERY IMPORTANT:    Blanca - please schedule with me in my fellows clinic in next 1-2 weeks to discuss VCE for iron def anemia.    Blanca- please tell patient that they are iron deficient and anemic and recommend that they take ferrous sulfate one 325mg pill every 12 hours for next 4 months.    Please order repeat fasting Hemoglobin, Iron/TIBC, and Ferritin in 8 weeks - Orders placed.

## 2020-08-28 ENCOUNTER — TELEPHONE (OUTPATIENT)
Dept: GASTROENTEROLOGY | Facility: CLINIC | Age: 60
End: 2020-08-28

## 2020-08-28 NOTE — TELEPHONE ENCOUNTER
----- Message from Mariana Rogers sent at 8/28/2020  1:20 PM CDT -----  Blanca -- pt is returning your call. [phone]

## 2020-09-21 DIAGNOSIS — R11.0 NAUSEA: Primary | ICD-10-CM

## 2020-09-21 RX ORDER — ONDANSETRON 4 MG/1
4 TABLET, FILM COATED ORAL EVERY 12 HOURS PRN
Qty: 60 TABLET | Refills: 0 | Status: SHIPPED | OUTPATIENT
Start: 2020-09-21 | End: 2020-10-21

## 2020-09-30 ENCOUNTER — TELEPHONE (OUTPATIENT)
Dept: GASTROENTEROLOGY | Facility: CLINIC | Age: 60
End: 2020-09-30

## 2020-09-30 NOTE — TELEPHONE ENCOUNTER
Blanca,  Pt was scheduled with Dr. Onofre for a virtual visit   Pt was having trouble getting on \she reached out to technical support and was told it will be awhile before they call her back  Pt wants to see Dr. Cobb she states she feels comfortable seeing her MD  Please advise pt

## 2020-09-30 NOTE — TELEPHONE ENCOUNTER
Called and spoke to pt.  Pt scheduled for fellow clinic with Dr. Cobb.  Pt accepted appt and appreciated the call.

## 2020-09-30 NOTE — TELEPHONE ENCOUNTER
----- Message from Eneida De Dios sent at 9/30/2020  1:33 PM CDT -----  Pt#[phone]     Calling regarding test results. She states that she was speaking to somebody and call dropped

## 2020-10-19 ENCOUNTER — LAB VISIT (OUTPATIENT)
Dept: LAB | Facility: HOSPITAL | Age: 60
End: 2020-10-19
Attending: INTERNAL MEDICINE
Payer: MEDICARE

## 2020-10-19 ENCOUNTER — TELEPHONE (OUTPATIENT)
Dept: GASTROENTEROLOGY | Facility: CLINIC | Age: 60
End: 2020-10-19

## 2020-10-19 DIAGNOSIS — D50.9 IRON DEFICIENCY ANEMIA, UNSPECIFIED IRON DEFICIENCY ANEMIA TYPE: ICD-10-CM

## 2020-10-19 LAB
FERRITIN SERPL-MCNC: 39 NG/ML (ref 20–300)
HGB BLD-MCNC: 11.6 G/DL (ref 12–16)
IRON SERPL-MCNC: 107 UG/DL (ref 30–160)
SATURATED IRON: 27 % (ref 20–50)
TOTAL IRON BINDING CAPACITY: 392 UG/DL (ref 250–450)
TRANSFERRIN SERPL-MCNC: 265 MG/DL (ref 200–375)

## 2020-10-19 PROCEDURE — 36415 COLL VENOUS BLD VENIPUNCTURE: CPT | Mod: PN

## 2020-10-19 PROCEDURE — 85018 HEMOGLOBIN: CPT

## 2020-10-19 PROCEDURE — 82728 ASSAY OF FERRITIN: CPT

## 2020-10-19 PROCEDURE — 83540 ASSAY OF IRON: CPT

## 2020-10-19 NOTE — TELEPHONE ENCOUNTER
----- Message from Luciana Gilliland MA sent at 10/19/2020  8:25 AM CDT -----  Contact: [phone]  Pt is doing lab work today and states that she was supposed to be getting a call to be fit in for an appt with Dr Cobb

## 2020-10-20 ENCOUNTER — TELEPHONE (OUTPATIENT)
Dept: GASTROENTEROLOGY | Facility: CLINIC | Age: 60
End: 2020-10-20

## 2020-10-20 DIAGNOSIS — D50.9 IRON DEFICIENCY ANEMIA, UNSPECIFIED IRON DEFICIENCY ANEMIA TYPE: Primary | ICD-10-CM

## 2020-10-20 RX ORDER — FERROUS SULFATE 325(65) MG
325 TABLET ORAL EVERY 12 HOURS
Qty: 60 TABLET | Refills: 3 | Status: SHIPPED | OUTPATIENT
Start: 2020-10-20 | End: 2021-02-17

## 2020-11-30 RX ORDER — ONDANSETRON 4 MG/1
4 TABLET, FILM COATED ORAL EVERY 8 HOURS PRN
Qty: 30 TABLET | Refills: 1 | Status: SHIPPED | OUTPATIENT
Start: 2020-11-30 | End: 2021-02-19 | Stop reason: SDUPTHER

## 2020-12-04 RX ORDER — ONDANSETRON 4 MG/1
8 TABLET, ORALLY DISINTEGRATING ORAL EVERY 12 HOURS PRN
Qty: 60 TABLET | Refills: 0 | Status: SHIPPED | OUTPATIENT
Start: 2020-12-04 | End: 2021-01-12

## 2020-12-04 NOTE — TELEPHONE ENCOUNTER
Dr. Cobb   Pt states shes having problems swallowing the zofran and wants the dissolvable tablet  Please advise

## 2020-12-04 NOTE — TELEPHONE ENCOUNTER
----- Message from So Medrano sent at 12/4/2020  1:36 PM CST -----  Regarding: PT  Contact: PT  PT called to see if the doctor could call in the dissolvable zolfran tablets bc its hard for her to swallow the other ones. Please resend the prescription     Callback: [phone]

## 2020-12-09 ENCOUNTER — OFFICE VISIT (OUTPATIENT)
Dept: GASTROENTEROLOGY | Facility: CLINIC | Age: 60
End: 2020-12-09
Payer: MEDICARE

## 2020-12-09 ENCOUNTER — HOSPITAL ENCOUNTER (OUTPATIENT)
Dept: CARDIOLOGY | Facility: CLINIC | Age: 60
Discharge: HOME | End: 2020-12-09
Payer: MEDICARE

## 2020-12-09 VITALS
WEIGHT: 95.44 LBS | SYSTOLIC BLOOD PRESSURE: 101 MMHG | HEART RATE: 61 BPM | DIASTOLIC BLOOD PRESSURE: 58 MMHG | BODY MASS INDEX: 17.56 KG/M2 | HEIGHT: 62 IN

## 2020-12-09 DIAGNOSIS — K30 FUNCTIONAL DYSPEPSIA: Primary | ICD-10-CM

## 2020-12-09 DIAGNOSIS — K30 FUNCTIONAL DYSPEPSIA: ICD-10-CM

## 2020-12-09 DIAGNOSIS — Z79.899 LONG-TERM USE OF HIGH-RISK MEDICATION: ICD-10-CM

## 2020-12-09 PROCEDURE — 99214 OFFICE O/P EST MOD 30 MIN: CPT | Mod: GC,S$GLB,, | Performed by: INTERNAL MEDICINE

## 2020-12-09 PROCEDURE — 99999 PR PBB SHADOW E&M-EST. PATIENT-LVL IV: CPT | Mod: PBBFAC,GC,, | Performed by: INTERNAL MEDICINE

## 2020-12-09 PROCEDURE — 93010 EKG 12-LEAD: ICD-10-PCS | Mod: S$GLB,,, | Performed by: INTERNAL MEDICINE

## 2020-12-09 PROCEDURE — 93010 ELECTROCARDIOGRAM REPORT: CPT | Mod: S$GLB,,, | Performed by: INTERNAL MEDICINE

## 2020-12-09 PROCEDURE — 93005 EKG 12-LEAD: ICD-10-PCS | Mod: S$GLB,,, | Performed by: INTERNAL MEDICINE

## 2020-12-09 PROCEDURE — 93005 ELECTROCARDIOGRAM TRACING: CPT | Mod: S$GLB,,, | Performed by: INTERNAL MEDICINE

## 2020-12-09 PROCEDURE — 99214 PR OFFICE/OUTPT VISIT, EST, LEVL IV, 30-39 MIN: ICD-10-PCS | Mod: GC,S$GLB,, | Performed by: INTERNAL MEDICINE

## 2020-12-09 PROCEDURE — 99999 PR PBB SHADOW E&M-EST. PATIENT-LVL IV: ICD-10-PCS | Mod: PBBFAC,GC,, | Performed by: INTERNAL MEDICINE

## 2020-12-09 RX ORDER — AMITRIPTYLINE HYDROCHLORIDE 25 MG/1
25 TABLET, FILM COATED ORAL NIGHTLY
Qty: 30 TABLET | Refills: 1 | Status: SHIPPED | OUTPATIENT
Start: 2020-12-09 | End: 2022-01-10

## 2020-12-09 NOTE — PROGRESS NOTES
[facility]    Reason for visit: The primary encounter diagnosis was Functional dyspepsia. A diagnosis of Long-term use of high-risk medication was also pertinent to this visit.  Referring Provider/PCP: Sebastien Yousif Jr, MD    History of Present Illness:  Luciana Thurston is a 60 y.o. female with a history of SLE, CRESCENCIO, afib, GERD, anxiety who is presenting for follow-up evaluation of reflux, epigastric pain, dysphagia.    Patient first seen by [facility] while hospitalized in March 2020 for chronic vomiting, diarrhea and weight loss.  Gastric emptying study was recommended which was normal.  Patient last seen by Dr. Cobb 7/20/20 for epigastric pain, diarrhea, weight loss x2 years. Workup notable for negative / normal stool studies (C diff, cyclospora, microsporidia, fecal elastase, Giardia/Crypto, stool OCP x2), celiac panel, TSH, cortisol. Noted to be mildly iron deficient. CT a/p with IV / PO / rectal contrast 7/24 unremarkable. 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 Had positive HP IgG in March 2020, but not seen on EGD biopsies 8/2020.    Today, complaining of reflux, dysphagia and epigastric soreness.  Symptoms have been going for months, but worse last 2 weeks.  Feels like food sits in lower esophagus. Takes pantoprazole twice a day about an hour before meals, but doesn't feel like it's helping. Previously taking Nexium but not sure if this helped more or less. No odynophagia. No NSAIDs. Takes iron once every other day.  No overt GI bleeding.  Reports compliance with lactose free diet (as well as gluten free diet) and diarrhea improved, but still has it intermittently. Takes imodium prn which helps. Doesn't feel like she's gained weight, but weight today 95lbs compared to 90lbs in August.    PEndoHx:  EGD 8/12/20 for CRESCENCIO, weight loss, diarrhea: normal. Disaccharidase panel with lactase deficiency. Gastric and duodenal biopsies otherwise  normal.  Colonoscopy 8/12/20 for CRESCENCIO, weight loss, diarrhea: normal TI, diverticulosis. Random colon and TI biopsies normal.    Review of Systems:   Constitutional: no fever, chills or change in weight   Eyes: no visual changes   ENT: no sore throat  Respiratory: no cough or shortness of breath   Cardiovascular: no chest pain or palpitations   Gastrointestinal: as per HPI  Hematologic/Lymphatic: no easy bruising or lymphadenopathy   Musculoskeletal: no arthralgias or myalgias   Neurological: no change in mental status  Behavioral/Psych: no change in mood    Medical, surgical, family, social history reviewed. Medications and allergies reviewed.    Physical Exam:  Vitals:    12/09/20 1325   BP: (!) 101/58   Pulse: 61     General: Alert and Oriented x3, no distress  HEENT: Normocephalic, Atraumatic. No scleral icterus.  Resp: Effort normal  Cardiac: RRR  Abdomen: Normoactive bowel sounds. Non-distended. Soft. Discomfort to palpation epigastric area.  Extremities: No peripheral edema.  Neurologic: No gross neurological Deficits  Psych: Calm, cooperative. Normal mood and affect.    Laboratory:  Lab Results   Component Value Date     07/20/2020     07/20/2020    K 3.8 07/20/2020    K 3.8 07/20/2020     07/20/2020     07/20/2020    CO2 31 (H) 07/20/2020    CO2 31 (H) 07/20/2020    BUN 3 (L) 07/20/2020    BUN 3 (L) 07/20/2020    CREATININE 0.7 07/20/2020    CREATININE 0.7 07/20/2020    CALCIUM 9.8 07/20/2020    CALCIUM 9.8 07/20/2020    ANIONGAP 6 (L) 07/20/2020    ANIONGAP 6 (L) 07/20/2020    ESTGFRAFRICA >60.0 07/20/2020    ESTGFRAFRICA >60.0 07/20/2020    EGFRNONAA >60.0 07/20/2020    EGFRNONAA >60.0 07/20/2020       Lab Results   Component Value Date    ALT 14 07/20/2020    ALT 14 07/20/2020    AST 23 07/20/2020    AST 23 07/20/2020    ALKPHOS 60 07/20/2020    ALKPHOS 60 07/20/2020    BILITOT 0.9 07/20/2020    BILITOT 0.9 07/20/2020       Lab Results   Component Value Date    WBC 3.26 (L)  08/12/2020    HGB 11.6 (L) 10/19/2020    HCT 36.2 (L) 08/12/2020    MCV 96 08/12/2020     08/12/2020       Microbiology:  Reviewed    Imaging:  Reviewed    Assessment:  Luciana Thurston is a 60 y.o. female who is presenting for follow-up evaluation of reflux, epigastric pain, dysphagia.    Patient has history of chronic GI symptoms with extensive workup in the past.  This includes workup for chronic diarrhea - negative stool studies, celiac labs and biopsy, TSH.  Gastric emptying study normal March 2020.  Had EGD and colonoscopy August 2020 which were unremarkable other than lactase deficiency on disaccharidase panel.  She is mildly iron deficient, but no overt bleeding.  Has gained weight since her last visit.  Overall, suspect her chronic symptoms are due to functional dyspepsia.    Plan:  1. Will start amitriptyline 25 mg every night.  As she is on sotalol, checked EKG today - QTc 438.  2. Continue oral iron every other day.  Will check iron labs at next visit  3. CRC Screening:  Next due 2030  Follow up in about 4 weeks (around 1/6/2021).    Moises Onofre MD  Gastroenterology Fellow (PGY IV)  Phone: [phone]    Orders Placed This Encounter   Procedures    EKG 12-lead

## 2020-12-10 NOTE — PROGRESS NOTES
I have discussed the patient with the fellow and agree with their history, examination, and plan.

## 2020-12-31 ENCOUNTER — OFFICE VISIT (OUTPATIENT)
Dept: URGENT CARE | Facility: CLINIC | Age: 60
End: 2020-12-31
Payer: MEDICARE

## 2020-12-31 VITALS
TEMPERATURE: 97 F | HEART RATE: 68 BPM | SYSTOLIC BLOOD PRESSURE: 123 MMHG | HEIGHT: 62 IN | OXYGEN SATURATION: 99 % | RESPIRATION RATE: 18 BRPM | DIASTOLIC BLOOD PRESSURE: 57 MMHG | WEIGHT: 95 LBS | BODY MASS INDEX: 17.48 KG/M2

## 2020-12-31 DIAGNOSIS — S69.91XA WRIST INJURY, RIGHT, INITIAL ENCOUNTER: Primary | ICD-10-CM

## 2020-12-31 DIAGNOSIS — S00.83XA CONTUSION OF FACE, INITIAL ENCOUNTER: ICD-10-CM
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
## 2020-12-31 PROCEDURE — 99213 PR OFFICE/OUTPT VISIT, EST, LEVL III, 20-29 MIN: ICD-10-PCS | Mod: S$GLB,,, | Performed by: NURSE PRACTITIONER

## 2020-12-31 PROCEDURE — 73110 XR WRIST COMPLETE 3 VIEWS RIGHT: ICD-10-PCS | Mod: RT,S$GLB,, | Performed by: RADIOLOGY

## 2020-12-31 PROCEDURE — 99213 OFFICE O/P EST LOW 20 MIN: CPT | Mod: S$GLB,,, | Performed by: NURSE PRACTITIONER

## 2020-12-31 PROCEDURE — 3008F BODY MASS INDEX DOCD: CPT | Mod: CPTII,S$GLB,, | Performed by: NURSE PRACTITIONER

## 2020-12-31 PROCEDURE — 3008F PR BODY MASS INDEX (BMI) DOCUMENTED: ICD-10-PCS | Mod: CPTII,S$GLB,, | Performed by: NURSE PRACTITIONER

## 2020-12-31 PROCEDURE — 73110 X-RAY EXAM OF WRIST: CPT | Mod: RT,S$GLB,, | Performed by: RADIOLOGY

## 2020-12-31 RX ORDER — DICYCLOMINE HYDROCHLORIDE 10 MG/1
CAPSULE ORAL
COMMUNITY
Start: 2020-11-29 | End: 2023-03-02 | Stop reason: SDUPTHER

## 2020-12-31 RX ORDER — FAMOTIDINE 20 MG/1
TABLET, FILM COATED ORAL
COMMUNITY
Start: 2020-10-19

## 2020-12-31 NOTE — PATIENT INSTRUCTIONS
If your condition worsens or fails to improve we recommend that you receive another evaluation at the ER immediately or contact your PCP to discuss your concerns or return here. You must understand that you've received an urgent care treatment only and that you may be released before all your medical problems are known or treated. You the patient will arrange for follouwp care as instructed.   Tylenol can also be used as directed for pain unless you have an allergy to them or medical condition such as stomach ulcers, kidney or liver disease or blood thinners etc for which you should not be taking these type of medications.   RICE which means rest, ice compression and elevation are helpful.   If you were given a splint wear it at all times.         Wrist Sprain  A sprain is an injury to the ligaments or capsule that holds a joint together. There are no broken bones. Most sprains take about 3 to 6 weeks to heal. If it a severe sprain where the ligament is completely torn, it can take months to recover.     Most wrist sprains are treated with a splint, wrist brace, or elastic wrap for support. Severe sprains may require surgery.  Home care  · Keep your arm elevated to reduce pain and swelling. This is very important during the first 48 hours.  · Apply an ice pack over the injured area for 15 to 20 minutes every 3 to 6 hours. You should do this for the first 24 to 48 hours. You can make an ice pack by filling a plastic bag that seals at the top with ice cubes and then wrapping it with a thin towel. Continue to use ice packs for relief of pain and swelling as needed. As the ice melts, be careful to avoid getting your wrap, splint, or cast wet. After 48 hours, apply heat (warm shower or warm bath) for 15 to 20 minutes several times a day, or alternate ice and heat.   · You may use over-the-counter pain medicine to control pain, unless another pain medicine was prescribed. If you have chronic liver or kidney disease or  ever had a stomach ulcer or GI bleeding, talk with your doctor before using these medicines.  · If you were given a splint or brace, wear it for the time advised by your doctor.  Follow-up care  Follow up with your healthcare provider as advised. Any X-rays you had today dont show any broken bones, breaks, or fractures. Sometimes fractures dont show up on the first X-ray. Bruises and sprains can sometimes hurt as much as a fracture. These injuries can take time to heal completely. If your symptoms dont improve or they get worse, talk with your doctor. You may need a repeat X-ray. If X-rays were taken, you will be told of any new findings that may affect your care.  When to seek medical advice  Call your healthcare provider right away if any of these occur:  · Pain or swelling increases  · Fingers or hand becomes cold, blue, numb, or tingly  Date Last Reviewed: 11/20/2015  © 9411-4474 [employer]. [address] [place] [address], [place] [address]. All rights reserved. This information is not intended as a substitute for professional medical care. Always follow your healthcare professional's instructions.        Facial Contusion  A contusion is another word for a bruise. It happens when small blood vessels break open and leak blood into the nearby area. A facial contusion can result from a bump, hit, or fall. This may happen during sports or an accident. Symptoms of a contusion often include changes in skin color (bruising), swelling, and pain.   The swelling from the contusion should decrease in a few days. Bruising and pain may take several weeks to go away.   Home care  · If you have been prescribed medicines for pain, take them as directed.  · To help reduce swelling and pain, wrap a cold pack or bag of frozen peas in a thin towel. Put it on the injured area for up to 20 minutes. Do this a few times a day until the swelling goes down.   · If you have scrapes or cuts on your face requiring stiches or other  closures, care for them as directed.  · For the next 24 hours (or longer if instructed):  ¨ Dont drink alcohol, or use sedatives or medicines that make you sleepy.  ¨ Dont drive or operate machinery.  ¨ Avoid doing anything strenuous. Dont lift or strain.  ¨ Do not return to sports or other activity that could result in another head injury.  Note about concussion  Because the injury was to your head, it is possible that a concussion (mild brain injury) could result. You don't have signs of a concussion at this time. But symptoms can show up later. Be alert for signs and symptoms of a concussion. Seek emergency medical care if any of these develop over the next hours to days:  · Headache  · Nausea or vomiting  · Dizziness  · Sensitivity to light or noise  · Unusual sleepiness or grogginess  · Trouble falling asleep  · Personality changes  · Vision changes  · Memory loss  · Confusion  · Trouble walking or clumsiness  · Loss of consciousness (even for a short time)  · Inability to be awakened   Follow-up care  Follow up with your healthcare provider or our staff as directed.  When to seek medical advice  Call your healthcare provider right away if any of these occur:  · Swelling or pain that gets worse, not better  · New swelling or pain  · Warmth or drainage from the swollen area or from cuts or scrapes  · Fluid drainage or bleeding from the nose or ears  · Fever of 100.4ºF (38ºC) or higher, or as directed by your healthcare provider  Date Last Reviewed: 5/7/2015  © 7682-1927 [employer]. [address] [place] [address], [place] [address]. All rights reserved. This information is not intended as a substitute for professional medical care. Always follow your healthcare professional's instructions.

## 2020-12-31 NOTE — PROGRESS NOTES
"Subjective:       Patient ID: Luciana Thurston is a 60 y.o. female.    Vitals:  height is 5' 2" (1.575 m) and weight is 43.1 kg (95 lb). Her tympanic temperature is 97.1 °F (36.2 °C). Her blood pressure is 123/57 (abnormal) and her pulse is 68. Her respiration is 18 and oxygen saturation is 99%.     Chief Complaint: Wrist Pain    Patient presents with c/o trip and fall pta with striking the right side of her face above her right eyebrow and bracing her fall with her right wrist.  Denies LOC, headache, N/V, dizziness, or blurred vision.  She denies blood thinner use.  Pain to right wrist.    Wrist Pain   The pain is present in the right wrist. This is a new problem. The current episode started today. The problem occurs constantly. The problem has been unchanged. The pain is at a severity of 8/10. Associated symptoms include joint swelling. Pertinent negatives include no fever, inability to bear weight, itching, joint locking, limited range of motion, numbness, stiffness or tingling. She has tried cold for the symptoms. The treatment provided mild relief.   Head Injury   The incident occurred 1 to 3 hours ago. The injury mechanism was a fall. There was no loss of consciousness. There was no blood loss. The pain is at a severity of 0/10. The patient is experiencing no pain. Pertinent negatives include no blurred vision, disorientation, headaches, memory loss, numbness, tinnitus, vomiting or weakness. She has tried ice for the symptoms.       Constitution: Negative for chills, fatigue and fever.   HENT: Negative for tinnitus, congestion and sore throat.    Neck: Negative for painful lymph nodes.   Cardiovascular: Negative for chest pain and leg swelling.   Eyes: Negative for double vision and blurred vision.   Respiratory: Negative for cough and shortness of breath.    Gastrointestinal: Negative for nausea, vomiting and diarrhea.   Genitourinary: Negative for dysuria, frequency, urgency and history of kidney " stones.   Musculoskeletal: Positive for joint pain and joint swelling. Negative for abnormal ROM of joint, muscle cramps and muscle ache.   Skin: Negative for color change, pale, rash and bruising.   Allergic/Immunologic: Negative for seasonal allergies.   Neurological: Negative for dizziness, history of vertigo, light-headedness, passing out, headaches, disorientation and numbness.   Hematologic/Lymphatic: Negative for swollen lymph nodes.   Psychiatric/Behavioral: Negative for disorientation, nervous/anxious, sleep disturbance and depression. The patient is not nervous/anxious.        Objective:      Physical Exam   Constitutional: She is oriented to person, place, and time. She appears well-developed. She is cooperative.  Non-toxic appearance. She does not appear ill. No distress.   HENT:   Head: Normocephalic and atraumatic. Head is without abrasion, without contusion and without laceration.   Ears:   Right Ear: Hearing, tympanic membrane, external ear and ear canal normal. No hemotympanum.   Left Ear: Hearing, tympanic membrane, external ear and ear canal normal. No hemotympanum.   Nose: Nose normal. No mucosal edema, rhinorrhea or nasal deformity. No epistaxis. Right sinus exhibits no maxillary sinus tenderness and no frontal sinus tenderness. Left sinus exhibits no maxillary sinus tenderness and no frontal sinus tenderness.   Mouth/Throat: Uvula is midline, oropharynx is clear and moist and mucous membranes are normal. No trismus in the jaw. Normal dentition. No uvula swelling. No posterior oropharyngeal erythema.   Eyes: Pupils are equal, round, and reactive to light. Conjunctivae, EOM and lids are normal. Right eye exhibits no discharge. Left eye exhibits no discharge. No visual field deficit is present. No scleral icterus.   Neck: Trachea normal, normal range of motion, full passive range of motion without pain and phonation normal. Neck supple. No spinous process tenderness and no muscular tenderness  present. No neck rigidity. No tracheal deviation present.   Cardiovascular: Normal rate, regular rhythm, normal heart sounds and normal pulses.   Pulmonary/Chest: Effort normal and breath sounds normal. No respiratory distress.   Abdominal: Soft. Normal appearance and bowel sounds are normal. She exhibits no distension, no pulsatile midline mass and no mass. There is no abdominal tenderness.   Musculoskeletal: Normal range of motion.         General: No deformity.      Right wrist: She exhibits tenderness. She exhibits normal range of motion, no bony tenderness, no swelling, no effusion, no crepitus, no deformity and no laceration.      Right hand: Normal.   Neurological: She is alert and oriented to person, place, and time. She has normal motor skills, normal sensation, normal strength, normal reflexes and intact cranial nerves. She displays facial symmetry. No cranial nerve deficit or sensory deficit. She exhibits normal muscle tone. She displays no seizure activity. Gait and coordination normal. Coordination normal. GCS eye subscore is 4. GCS verbal subscore is 5. GCS motor subscore is 6.   Skin: Skin is warm, dry, intact, not diaphoretic and not pale. Capillary refill takes less than 2 seconds. Lesions:  bruising (small contusion above right eyebrow.  Small contusion to right inner wrist)abrasion, burn and ecchymosisPsychiatric: Her speech is normal and behavior is normal. Judgment and thought content normal.   Nursing note and vitals reviewed.  X-ray Wrist Complete 3 Views Right    Result Date: 12/31/2020  EXAMINATION: XR WRIST COMPLETE 3 VIEWS RIGHT CLINICAL HISTORY: Unspecified injury of right wrist, hand and finger(s), initial encounter FINDINGS: Wrist three views right: No fracture dislocation bone destruction seen.  No trauma seen.     No acute process seen. Electronically signed by: Sonu Nathan MD Date:    12/31/2020 Time:    14:37      Assessment:       1. Wrist injury, right, initial encounter    2.  Fall, initial encounter    3. Contusion of face, initial encounter        Plan:       Xray reviewed.  Brace applied to right wrist, pt tolerated well, neurovascularly intact pre and post application by MA.  Wrist injury, right, initial encounter  -     X-Ray Wrist Complete 3 views Right; Future; Expected date: 12/31/2020  -     WRIST BRACE FOR HOME USE    Fall, initial encounter    Contusion of face, initial encounter      Patient Instructions       If your condition worsens or fails to improve we recommend that you receive another evaluation at the ER immediately or contact your PCP to discuss your concerns or return here. You must understand that you've received an urgent care treatment only and that you may be released before all your medical problems are known or treated. You the patient will arrange for follouwp care as instructed.   Tylenol can also be used as directed for pain unless you have an allergy to them or medical condition such as stomach ulcers, kidney or liver disease or blood thinners etc for which you should not be taking these type of medications.   RICE which means rest, ice compression and elevation are helpful.   If you were given a splint wear it at all times.         Wrist Sprain  A sprain is an injury to the ligaments or capsule that holds a joint together. There are no broken bones. Most sprains take about 3 to 6 weeks to heal. If it a severe sprain where the ligament is completely torn, it can take months to recover.     Most wrist sprains are treated with a splint, wrist brace, or elastic wrap for support. Severe sprains may require surgery.  Home care  · Keep your arm elevated to reduce pain and swelling. This is very important during the first 48 hours.  · Apply an ice pack over the injured area for 15 to 20 minutes every 3 to 6 hours. You should do this for the first 24 to 48 hours. You can make an ice pack by filling a plastic bag that seals at the top with ice cubes and then  wrapping it with a thin towel. Continue to use ice packs for relief of pain and swelling as needed. As the ice melts, be careful to avoid getting your wrap, splint, or cast wet. After 48 hours, apply heat (warm shower or warm bath) for 15 to 20 minutes several times a day, or alternate ice and heat.   · You may use over-the-counter pain medicine to control pain, unless another pain medicine was prescribed. If you have chronic liver or kidney disease or ever had a stomach ulcer or GI bleeding, talk with your doctor before using these medicines.  · If you were given a splint or brace, wear it for the time advised by your doctor.  Follow-up care  Follow up with your healthcare provider as advised. Any X-rays you had today dont show any broken bones, breaks, or fractures. Sometimes fractures dont show up on the first X-ray. Bruises and sprains can sometimes hurt as much as a fracture. These injuries can take time to heal completely. If your symptoms dont improve or they get worse, talk with your doctor. You may need a repeat X-ray. If X-rays were taken, you will be told of any new findings that may affect your care.  When to seek medical advice  Call your healthcare provider right away if any of these occur:  · Pain or swelling increases  · Fingers or hand becomes cold, blue, numb, or tingly  Date Last Reviewed: 11/20/2015  © 9256-2629 [employer]. [address] [place] [address]. All rights reserved. This information is not intended as a substitute for professional medical care. Always follow your healthcare professional's instructions.        Facial Contusion  A contusion is another word for a bruise. It happens when small blood vessels break open and leak blood into the nearby area. A facial contusion can result from a bump, hit, or fall. This may happen during sports or an accident. Symptoms of a contusion often include changes in skin color (bruising), swelling, and pain.   The swelling  from the contusion should decrease in a few days. Bruising and pain may take several weeks to go away.   Home care  · If you have been prescribed medicines for pain, take them as directed.  · To help reduce swelling and pain, wrap a cold pack or bag of frozen peas in a thin towel. Put it on the injured area for up to 20 minutes. Do this a few times a day until the swelling goes down.   · If you have scrapes or cuts on your face requiring stiches or other closures, care for them as directed.  · For the next 24 hours (or longer if instructed):  ¨ Dont drink alcohol, or use sedatives or medicines that make you sleepy.  ¨ Dont drive or operate machinery.  ¨ Avoid doing anything strenuous. Dont lift or strain.  ¨ Do not return to sports or other activity that could result in another head injury.  Note about concussion  Because the injury was to your head, it is possible that a concussion (mild brain injury) could result. You don't have signs of a concussion at this time. But symptoms can show up later. Be alert for signs and symptoms of a concussion. Seek emergency medical care if any of these develop over the next hours to days:  · Headache  · Nausea or vomiting  · Dizziness  · Sensitivity to light or noise  · Unusual sleepiness or grogginess  · Trouble falling asleep  · Personality changes  · Vision changes  · Memory loss  · Confusion  · Trouble walking or clumsiness  · Loss of consciousness (even for a short time)  · Inability to be awakened   Follow-up care  Follow up with your healthcare provider or our staff as directed.  When to seek medical advice  Call your healthcare provider right away if any of these occur:  · Swelling or pain that gets worse, not better  · New swelling or pain  · Warmth or drainage from the swollen area or from cuts or scrapes  · Fluid drainage or bleeding from the nose or ears  · Fever of 100.4ºF (38ºC) or higher, or as directed by your healthcare provider  Date Last Reviewed:  5/7/2015  © 1954-7406 [employer]. [address] [place] [address], [place] [address]. All rights reserved. This information is not intended as a substitute for professional medical care. Always follow your healthcare professional's instructions.

## 2021-01-08 RX ORDER — ONDANSETRON 4 MG/1
4 TABLET, FILM COATED ORAL EVERY 8 HOURS PRN
Qty: 30 TABLET | Refills: 1 | OUTPATIENT
Start: 2021-01-08

## 2021-01-12 ENCOUNTER — TELEPHONE (OUTPATIENT)
Dept: GASTROENTEROLOGY | Facility: CLINIC | Age: 61
End: 2021-01-12

## 2021-02-18 ENCOUNTER — TELEPHONE (OUTPATIENT)
Dept: GASTROENTEROLOGY | Facility: CLINIC | Age: 61
End: 2021-02-18

## 2021-02-19 RX ORDER — ONDANSETRON HYDROCHLORIDE 8 MG/1
8 TABLET, FILM COATED ORAL EVERY 12 HOURS PRN
Qty: 60 TABLET | Refills: 0 | Status: SHIPPED | OUTPATIENT
Start: 2021-02-19 | End: 2021-06-18 | Stop reason: DRUGHIGH

## 2021-02-19 RX ORDER — ONDANSETRON 4 MG/1
TABLET, ORALLY DISINTEGRATING ORAL
Qty: 60 TABLET | Refills: 0 | OUTPATIENT
Start: 2021-02-19

## 2021-02-22 ENCOUNTER — TELEPHONE (OUTPATIENT)
Dept: GASTROENTEROLOGY | Facility: CLINIC | Age: 61
End: 2021-02-22

## 2021-02-23 ENCOUNTER — TELEPHONE (OUTPATIENT)
Dept: GASTROENTEROLOGY | Facility: CLINIC | Age: 61
End: 2021-02-23

## 2021-02-24 ENCOUNTER — TELEPHONE (OUTPATIENT)
Dept: GASTROENTEROLOGY | Facility: CLINIC | Age: 61
End: 2021-02-24

## 2021-04-06 ENCOUNTER — LAB VISIT (OUTPATIENT)
Dept: LAB | Facility: HOSPITAL | Age: 61
End: 2021-04-06
Attending: INTERNAL MEDICINE
Payer: MEDICARE

## 2021-04-06 DIAGNOSIS — D50.9 IRON DEFICIENCY ANEMIA, UNSPECIFIED IRON DEFICIENCY ANEMIA TYPE: ICD-10-CM

## 2021-04-06 LAB — HGB BLD-MCNC: 11.2 G/DL (ref 12–16)

## 2021-04-06 PROCEDURE — 85018 HEMOGLOBIN: CPT | Performed by: INTERNAL MEDICINE

## 2021-04-06 PROCEDURE — 36415 COLL VENOUS BLD VENIPUNCTURE: CPT | Mod: PN | Performed by: INTERNAL MEDICINE

## 2021-04-06 PROCEDURE — 82728 ASSAY OF FERRITIN: CPT | Performed by: INTERNAL MEDICINE

## 2021-04-06 PROCEDURE — 83540 ASSAY OF IRON: CPT | Performed by: INTERNAL MEDICINE

## 2021-04-07 LAB
FERRITIN SERPL-MCNC: 41 NG/ML (ref 20–300)
IRON SERPL-MCNC: 54 UG/DL (ref 30–160)
SATURATED IRON: 15 % (ref 20–50)
TOTAL IRON BINDING CAPACITY: 361 UG/DL (ref 250–450)
TRANSFERRIN SERPL-MCNC: 244 MG/DL (ref 200–375)

## 2021-04-09 ENCOUNTER — TELEPHONE (OUTPATIENT)
Dept: GASTROENTEROLOGY | Facility: CLINIC | Age: 61
End: 2021-04-09

## 2021-04-18 DIAGNOSIS — D50.9 IRON DEFICIENCY ANEMIA, UNSPECIFIED IRON DEFICIENCY ANEMIA TYPE: Primary | ICD-10-CM

## 2021-04-18 RX ORDER — FERROUS SULFATE 325(65) MG
325 TABLET ORAL EVERY 12 HOURS
Qty: 60 TABLET | Refills: 4 | Status: SHIPPED | OUTPATIENT
Start: 2021-04-18 | End: 2023-05-05

## 2021-04-19 ENCOUNTER — TELEPHONE (OUTPATIENT)
Dept: GASTROENTEROLOGY | Facility: CLINIC | Age: 61
End: 2021-04-19

## 2021-05-21 RX ORDER — DICYCLOMINE HYDROCHLORIDE 10 MG/1
20 CAPSULE ORAL EVERY 12 HOURS PRN
OUTPATIENT
Start: 2021-05-21

## 2021-06-18 ENCOUNTER — OFFICE VISIT (OUTPATIENT)
Dept: GASTROENTEROLOGY | Facility: CLINIC | Age: 61
End: 2021-06-18
Payer: MEDICARE

## 2021-06-18 VITALS
SYSTOLIC BLOOD PRESSURE: 119 MMHG | WEIGHT: 97.88 LBS | HEIGHT: 63 IN | BODY MASS INDEX: 17.34 KG/M2 | DIASTOLIC BLOOD PRESSURE: 58 MMHG | HEART RATE: 58 BPM

## 2021-06-18 DIAGNOSIS — Z79.899 ENCOUNTER FOR MONITORING LONG-TERM PROTON PUMP INHIBITOR THERAPY: ICD-10-CM

## 2021-06-18 DIAGNOSIS — R63.4 WEIGHT LOSS, ABNORMAL: ICD-10-CM

## 2021-06-18 DIAGNOSIS — Z51.81 ENCOUNTER FOR MONITORING LONG-TERM PROTON PUMP INHIBITOR THERAPY: ICD-10-CM

## 2021-06-18 DIAGNOSIS — D50.9 IRON DEFICIENCY ANEMIA, UNSPECIFIED IRON DEFICIENCY ANEMIA TYPE: Primary | ICD-10-CM

## 2021-06-18 DIAGNOSIS — R11.0 NAUSEA: ICD-10-CM

## 2021-06-18 DIAGNOSIS — R62.7 FAILURE TO THRIVE IN ADULT: ICD-10-CM

## 2021-06-18 DIAGNOSIS — K59.09 CONSTIPATION, CHRONIC: ICD-10-CM

## 2021-06-18 PROCEDURE — 1126F PR PAIN SEVERITY QUANTIFIED, NO PAIN PRESENT: ICD-10-PCS | Mod: S$GLB,,, | Performed by: INTERNAL MEDICINE

## 2021-06-18 PROCEDURE — 99215 OFFICE O/P EST HI 40 MIN: CPT | Mod: S$GLB,,, | Performed by: INTERNAL MEDICINE

## 2021-06-18 PROCEDURE — 99999 PR PBB SHADOW E&M-EST. PATIENT-LVL V: CPT | Mod: PBBFAC,,, | Performed by: INTERNAL MEDICINE

## 2021-06-18 PROCEDURE — 3008F PR BODY MASS INDEX (BMI) DOCUMENTED: ICD-10-PCS | Mod: CPTII,S$GLB,, | Performed by: INTERNAL MEDICINE

## 2021-06-18 PROCEDURE — 99215 PR OFFICE/OUTPT VISIT, EST, LEVL V, 40-54 MIN: ICD-10-PCS | Mod: S$GLB,,, | Performed by: INTERNAL MEDICINE

## 2021-06-18 PROCEDURE — 99999 PR PBB SHADOW E&M-EST. PATIENT-LVL V: ICD-10-PCS | Mod: PBBFAC,,, | Performed by: INTERNAL MEDICINE

## 2021-06-18 PROCEDURE — 3008F BODY MASS INDEX DOCD: CPT | Mod: CPTII,S$GLB,, | Performed by: INTERNAL MEDICINE

## 2021-06-18 PROCEDURE — 1126F AMNT PAIN NOTED NONE PRSNT: CPT | Mod: S$GLB,,, | Performed by: INTERNAL MEDICINE

## 2021-06-18 RX ORDER — POLYETHYLENE GLYCOL 3350 17 G/17G
17 POWDER, FOR SOLUTION ORAL DAILY
Qty: 1530 G | Refills: 0 | Status: SHIPPED | OUTPATIENT
Start: 2021-06-18 | End: 2021-09-16

## 2021-06-18 RX ORDER — ONDANSETRON 4 MG/1
4 TABLET, FILM COATED ORAL EVERY 12 HOURS PRN
Qty: 60 TABLET | Refills: 0 | Status: SHIPPED | OUTPATIENT
Start: 2021-06-18 | End: 2021-08-13

## 2021-06-30 ENCOUNTER — TELEPHONE (OUTPATIENT)
Dept: GASTROENTEROLOGY | Facility: CLINIC | Age: 61
End: 2021-06-30

## 2021-07-01 ENCOUNTER — OFFICE VISIT (OUTPATIENT)
Dept: HEMATOLOGY/ONCOLOGY | Facility: CLINIC | Age: 61
End: 2021-07-01
Payer: MEDICARE

## 2021-07-01 VITALS
HEIGHT: 62 IN | TEMPERATURE: 99 F | RESPIRATION RATE: 14 BRPM | OXYGEN SATURATION: 100 % | SYSTOLIC BLOOD PRESSURE: 135 MMHG | DIASTOLIC BLOOD PRESSURE: 59 MMHG | HEART RATE: 69 BPM | BODY MASS INDEX: 18.01 KG/M2 | WEIGHT: 97.88 LBS

## 2021-07-01 DIAGNOSIS — D50.9 IRON DEFICIENCY ANEMIA, UNSPECIFIED IRON DEFICIENCY ANEMIA TYPE: ICD-10-CM

## 2021-07-01 DIAGNOSIS — E43 SEVERE MALNUTRITION: ICD-10-CM

## 2021-07-01 DIAGNOSIS — D50.8 IRON DEFICIENCY ANEMIA SECONDARY TO INADEQUATE DIETARY IRON INTAKE: Primary | ICD-10-CM

## 2021-07-01 PROCEDURE — 99999 PR PBB SHADOW E&M-EST. PATIENT-LVL V: CPT | Mod: PBBFAC,,, | Performed by: STUDENT IN AN ORGANIZED HEALTH CARE EDUCATION/TRAINING PROGRAM

## 2021-07-01 PROCEDURE — 99205 PR OFFICE/OUTPT VISIT, NEW, LEVL V, 60-74 MIN: ICD-10-PCS | Mod: S$GLB,,, | Performed by: STUDENT IN AN ORGANIZED HEALTH CARE EDUCATION/TRAINING PROGRAM

## 2021-07-01 PROCEDURE — 1126F PR PAIN SEVERITY QUANTIFIED, NO PAIN PRESENT: ICD-10-PCS | Mod: S$GLB,,, | Performed by: STUDENT IN AN ORGANIZED HEALTH CARE EDUCATION/TRAINING PROGRAM

## 2021-07-01 PROCEDURE — 99205 OFFICE O/P NEW HI 60 MIN: CPT | Mod: S$GLB,,, | Performed by: STUDENT IN AN ORGANIZED HEALTH CARE EDUCATION/TRAINING PROGRAM

## 2021-07-01 PROCEDURE — 3008F PR BODY MASS INDEX (BMI) DOCUMENTED: ICD-10-PCS | Mod: CPTII,S$GLB,, | Performed by: STUDENT IN AN ORGANIZED HEALTH CARE EDUCATION/TRAINING PROGRAM

## 2021-07-01 PROCEDURE — 99999 PR PBB SHADOW E&M-EST. PATIENT-LVL V: ICD-10-PCS | Mod: PBBFAC,,, | Performed by: STUDENT IN AN ORGANIZED HEALTH CARE EDUCATION/TRAINING PROGRAM

## 2021-07-01 PROCEDURE — 1126F AMNT PAIN NOTED NONE PRSNT: CPT | Mod: S$GLB,,, | Performed by: STUDENT IN AN ORGANIZED HEALTH CARE EDUCATION/TRAINING PROGRAM

## 2021-07-01 PROCEDURE — 3008F BODY MASS INDEX DOCD: CPT | Mod: CPTII,S$GLB,, | Performed by: STUDENT IN AN ORGANIZED HEALTH CARE EDUCATION/TRAINING PROGRAM

## 2021-07-02 ENCOUNTER — HOSPITAL ENCOUNTER (OUTPATIENT)
Dept: RADIOLOGY | Facility: OTHER | Age: 61
Discharge: HOME | End: 2021-07-02
Attending: INTERNAL MEDICINE
Payer: MEDICARE

## 2021-07-02 DIAGNOSIS — R63.4 WEIGHT LOSS, ABNORMAL: ICD-10-CM

## 2021-07-02 DIAGNOSIS — R62.7 FAILURE TO THRIVE IN ADULT: ICD-10-CM

## 2021-07-02 DIAGNOSIS — K59.09 CONSTIPATION, CHRONIC: ICD-10-CM

## 2021-07-02 DIAGNOSIS — D50.9 IRON DEFICIENCY ANEMIA, UNSPECIFIED IRON DEFICIENCY ANEMIA TYPE: ICD-10-CM

## 2021-07-02 PROCEDURE — 71260 CT THORAX DX C+: CPT | Mod: TC

## 2021-07-02 PROCEDURE — 25500020 PHARM REV CODE 255: Performed by: INTERNAL MEDICINE

## 2021-07-02 PROCEDURE — 71260 CT CHEST WITH CONTRAST: ICD-10-PCS | Mod: 26,,, | Performed by: RADIOLOGY

## 2021-07-02 PROCEDURE — 71260 CT THORAX DX C+: CPT | Mod: 26,,, | Performed by: RADIOLOGY

## 2021-07-02 RX ADMIN — IOHEXOL 75 ML: 350 INJECTION, SOLUTION INTRAVENOUS at 09:07

## 2021-07-06 ENCOUNTER — TELEPHONE (OUTPATIENT)
Dept: RHEUMATOLOGY | Facility: CLINIC | Age: 61
End: 2021-07-06

## 2021-07-09 ENCOUNTER — OFFICE VISIT (OUTPATIENT)
Dept: URGENT CARE | Facility: CLINIC | Age: 61
End: 2021-07-09
Payer: MEDICARE

## 2021-07-09 VITALS
SYSTOLIC BLOOD PRESSURE: 130 MMHG | WEIGHT: 100 LBS | RESPIRATION RATE: 18 BRPM | HEART RATE: 62 BPM | BODY MASS INDEX: 18.4 KG/M2 | OXYGEN SATURATION: 98 % | HEIGHT: 62 IN | TEMPERATURE: 98 F | DIASTOLIC BLOOD PRESSURE: 68 MMHG

## 2021-07-09 DIAGNOSIS — J02.9 PHARYNGITIS, UNSPECIFIED ETIOLOGY: Primary | ICD-10-CM

## 2021-07-09 LAB
CTP QC/QA: YES
CTP QC/QA: YES
MOLECULAR STREP A: NEGATIVE
SARS-COV-2 RDRP RESP QL NAA+PROBE: NEGATIVE

## 2021-07-09 PROCEDURE — 3008F PR BODY MASS INDEX (BMI) DOCUMENTED: ICD-10-PCS | Mod: CPTII,S$GLB,, | Performed by: FAMILY MEDICINE

## 2021-07-09 PROCEDURE — 87651 STREP A DNA AMP PROBE: CPT | Mod: QW,S$GLB,, | Performed by: FAMILY MEDICINE

## 2021-07-09 PROCEDURE — 99214 PR OFFICE/OUTPT VISIT, EST, LEVL IV, 30-39 MIN: ICD-10-PCS | Mod: S$GLB,,, | Performed by: FAMILY MEDICINE

## 2021-07-09 PROCEDURE — 87651 POCT STREP A MOLECULAR: ICD-10-PCS | Mod: QW,S$GLB,, | Performed by: FAMILY MEDICINE

## 2021-07-09 PROCEDURE — 3008F BODY MASS INDEX DOCD: CPT | Mod: CPTII,S$GLB,, | Performed by: FAMILY MEDICINE

## 2021-07-09 PROCEDURE — U0002: ICD-10-PCS | Mod: QW,S$GLB,, | Performed by: FAMILY MEDICINE

## 2021-07-09 PROCEDURE — 99214 OFFICE O/P EST MOD 30 MIN: CPT | Mod: S$GLB,,, | Performed by: FAMILY MEDICINE

## 2021-07-09 PROCEDURE — U0002 COVID-19 LAB TEST NON-CDC: HCPCS | Mod: QW,S$GLB,, | Performed by: FAMILY MEDICINE

## 2021-07-13 ENCOUNTER — LAB VISIT (OUTPATIENT)
Dept: LAB | Facility: HOSPITAL | Age: 61
End: 2021-07-13
Attending: INTERNAL MEDICINE
Payer: MEDICARE

## 2021-07-13 DIAGNOSIS — Z79.899 ENCOUNTER FOR MONITORING LONG-TERM PROTON PUMP INHIBITOR THERAPY: ICD-10-CM

## 2021-07-13 DIAGNOSIS — K59.09 CONSTIPATION, CHRONIC: ICD-10-CM

## 2021-07-13 DIAGNOSIS — D50.9 IRON DEFICIENCY ANEMIA, UNSPECIFIED IRON DEFICIENCY ANEMIA TYPE: ICD-10-CM

## 2021-07-13 DIAGNOSIS — R63.4 WEIGHT LOSS, ABNORMAL: ICD-10-CM

## 2021-07-13 DIAGNOSIS — Z51.81 ENCOUNTER FOR MONITORING LONG-TERM PROTON PUMP INHIBITOR THERAPY: ICD-10-CM

## 2021-07-13 DIAGNOSIS — R62.7 FAILURE TO THRIVE IN ADULT: ICD-10-CM

## 2021-07-13 LAB
25(OH)D3+25(OH)D2 SERPL-MCNC: 75 NG/ML (ref 30–96)
ALBUMIN SERPL BCP-MCNC: 3.9 G/DL (ref 3.5–5.2)
ALP SERPL-CCNC: 62 U/L (ref 55–135)
ALT SERPL W/O P-5'-P-CCNC: 11 U/L (ref 10–44)
ANION GAP SERPL CALC-SCNC: 10 MMOL/L (ref 8–16)
AST SERPL-CCNC: 25 U/L (ref 10–40)
BASOPHILS # BLD AUTO: 0.02 K/UL (ref 0–0.2)
BASOPHILS NFR BLD: 0.8 % (ref 0–1.9)
BILIRUB SERPL-MCNC: 0.8 MG/DL (ref 0.1–1)
BUN SERPL-MCNC: 7 MG/DL (ref 6–20)
CALCIUM SERPL-MCNC: 9.9 MG/DL (ref 8.7–10.5)
CHLORIDE SERPL-SCNC: 102 MMOL/L (ref 95–110)
CO2 SERPL-SCNC: 26 MMOL/L (ref 23–29)
CREAT SERPL-MCNC: 0.8 MG/DL (ref 0.5–1.4)
DIFFERENTIAL METHOD: ABNORMAL
EOSINOPHIL # BLD AUTO: 0 K/UL (ref 0–0.5)
EOSINOPHIL NFR BLD: 1.7 % (ref 0–8)
ERYTHROCYTE [DISTWIDTH] IN BLOOD BY AUTOMATED COUNT: 12.2 % (ref 11.5–14.5)
EST. GFR  (AFRICAN AMERICAN): >60 ML/MIN/1.73 M^2
EST. GFR  (NON AFRICAN AMERICAN): >60 ML/MIN/1.73 M^2
FERRITIN SERPL-MCNC: 114 NG/ML (ref 20–300)
GLUCOSE SERPL-MCNC: 94 MG/DL (ref 70–110)
HCT VFR BLD AUTO: 37.3 % (ref 37–48.5)
HGB BLD-MCNC: 12.2 G/DL (ref 12–16)
IMM GRANULOCYTES # BLD AUTO: 0 K/UL (ref 0–0.04)
IMM GRANULOCYTES NFR BLD AUTO: 0 % (ref 0–0.5)
IRON SERPL-MCNC: 89 UG/DL (ref 30–160)
LYMPHOCYTES # BLD AUTO: 1.1 K/UL (ref 1–4.8)
LYMPHOCYTES NFR BLD: 46.5 % (ref 18–48)
MAGNESIUM SERPL-MCNC: 1.7 MG/DL (ref 1.6–2.6)
MCH RBC QN AUTO: 30.5 PG (ref 27–31)
MCHC RBC AUTO-ENTMCNC: 32.7 G/DL (ref 32–36)
MCV RBC AUTO: 93 FL (ref 82–98)
MONOCYTES # BLD AUTO: 0.2 K/UL (ref 0.3–1)
MONOCYTES NFR BLD: 9.5 % (ref 4–15)
NEUTROPHILS # BLD AUTO: 1 K/UL (ref 1.8–7.7)
NEUTROPHILS NFR BLD: 41.5 % (ref 38–73)
NRBC BLD-RTO: 0 /100 WBC
PLATELET # BLD AUTO: 296 K/UL (ref 150–450)
PMV BLD AUTO: 11.2 FL (ref 9.2–12.9)
POTASSIUM SERPL-SCNC: 4.1 MMOL/L (ref 3.5–5.1)
PROT SERPL-MCNC: 7 G/DL (ref 6–8.4)
RBC # BLD AUTO: 4 M/UL (ref 4–5.4)
SATURATED IRON: 25 % (ref 20–50)
SODIUM SERPL-SCNC: 138 MMOL/L (ref 136–145)
TOTAL IRON BINDING CAPACITY: 351 UG/DL (ref 250–450)
TRANSFERRIN SERPL-MCNC: 237 MG/DL (ref 200–375)
VIT B12 SERPL-MCNC: >2000 PG/ML (ref 210–950)
WBC # BLD AUTO: 2.41 K/UL (ref 3.9–12.7)

## 2021-07-13 PROCEDURE — 85025 COMPLETE CBC W/AUTO DIFF WBC: CPT | Performed by: INTERNAL MEDICINE

## 2021-07-13 PROCEDURE — 82728 ASSAY OF FERRITIN: CPT | Performed by: INTERNAL MEDICINE

## 2021-07-13 PROCEDURE — 83540 ASSAY OF IRON: CPT | Performed by: INTERNAL MEDICINE

## 2021-07-13 PROCEDURE — 82306 VITAMIN D 25 HYDROXY: CPT | Performed by: INTERNAL MEDICINE

## 2021-07-13 PROCEDURE — 82607 VITAMIN B-12: CPT | Performed by: INTERNAL MEDICINE

## 2021-07-13 PROCEDURE — 36415 COLL VENOUS BLD VENIPUNCTURE: CPT | Mod: PN | Performed by: INTERNAL MEDICINE

## 2021-07-13 PROCEDURE — 80053 COMPREHEN METABOLIC PANEL: CPT | Performed by: INTERNAL MEDICINE

## 2021-07-13 PROCEDURE — 83735 ASSAY OF MAGNESIUM: CPT | Performed by: INTERNAL MEDICINE

## 2021-07-15 ENCOUNTER — INFUSION (OUTPATIENT)
Dept: INFUSION THERAPY | Facility: OTHER | Age: 61
End: 2021-07-15
Attending: STUDENT IN AN ORGANIZED HEALTH CARE EDUCATION/TRAINING PROGRAM
Payer: MEDICARE

## 2021-07-15 VITALS
TEMPERATURE: 98 F | RESPIRATION RATE: 16 BRPM | HEART RATE: 84 BPM | OXYGEN SATURATION: 99 % | DIASTOLIC BLOOD PRESSURE: 60 MMHG | SYSTOLIC BLOOD PRESSURE: 109 MMHG

## 2021-07-15 DIAGNOSIS — D50.8 IRON DEFICIENCY ANEMIA SECONDARY TO INADEQUATE DIETARY IRON INTAKE: Primary | ICD-10-CM

## 2021-07-15 PROCEDURE — 25000003 PHARM REV CODE 250: Performed by: STUDENT IN AN ORGANIZED HEALTH CARE EDUCATION/TRAINING PROGRAM

## 2021-07-15 PROCEDURE — 96365 THER/PROPH/DIAG IV INF INIT: CPT

## 2021-07-15 PROCEDURE — 63600175 PHARM REV CODE 636 W HCPCS: Mod: JG | Performed by: STUDENT IN AN ORGANIZED HEALTH CARE EDUCATION/TRAINING PROGRAM

## 2021-07-15 RX ORDER — SODIUM CHLORIDE 0.9 % (FLUSH) 0.9 %
10 SYRINGE (ML) INJECTION
Status: DISCONTINUED | OUTPATIENT
Start: 2021-07-15 | End: 2021-07-15 | Stop reason: HOSPADM

## 2021-07-15 RX ORDER — SODIUM CHLORIDE 0.9 % (FLUSH) 0.9 %
10 SYRINGE (ML) INJECTION
Status: CANCELLED | OUTPATIENT
Start: 2021-07-22

## 2021-07-15 RX ORDER — HEPARIN 100 UNIT/ML
5 SYRINGE INTRAVENOUS
Status: DISCONTINUED | OUTPATIENT
Start: 2021-07-15 | End: 2021-07-15 | Stop reason: HOSPADM

## 2021-07-15 RX ORDER — HEPARIN 100 UNIT/ML
5 SYRINGE INTRAVENOUS
Status: CANCELLED | OUTPATIENT
Start: 2021-07-22

## 2021-07-15 RX ORDER — SODIUM CHLORIDE 9 MG/ML
INJECTION, SOLUTION INTRAVENOUS CONTINUOUS
Status: CANCELLED | OUTPATIENT
Start: 2021-07-22

## 2021-07-15 RX ORDER — SODIUM CHLORIDE 9 MG/ML
INJECTION, SOLUTION INTRAVENOUS CONTINUOUS
Status: DISCONTINUED | OUTPATIENT
Start: 2021-07-15 | End: 2021-07-15 | Stop reason: HOSPADM

## 2021-07-15 RX ADMIN — SODIUM CHLORIDE: 0.9 INJECTION, SOLUTION INTRAVENOUS at 10:07

## 2021-07-15 RX ADMIN — FERRIC CARBOXYMALTOSE INJECTION 750 MG: 50 INJECTION, SOLUTION INTRAVENOUS at 10:07

## 2021-07-22 ENCOUNTER — INFUSION (OUTPATIENT)
Dept: INFUSION THERAPY | Facility: OTHER | Age: 61
End: 2021-07-22
Attending: STUDENT IN AN ORGANIZED HEALTH CARE EDUCATION/TRAINING PROGRAM
Payer: MEDICARE

## 2021-07-22 VITALS
TEMPERATURE: 99 F | RESPIRATION RATE: 16 BRPM | SYSTOLIC BLOOD PRESSURE: 123 MMHG | HEART RATE: 65 BPM | DIASTOLIC BLOOD PRESSURE: 59 MMHG | OXYGEN SATURATION: 100 %

## 2021-07-22 DIAGNOSIS — D50.8 IRON DEFICIENCY ANEMIA SECONDARY TO INADEQUATE DIETARY IRON INTAKE: Primary | ICD-10-CM

## 2021-07-22 PROCEDURE — 96365 THER/PROPH/DIAG IV INF INIT: CPT

## 2021-07-22 PROCEDURE — 63600175 PHARM REV CODE 636 W HCPCS: Mod: JG | Performed by: STUDENT IN AN ORGANIZED HEALTH CARE EDUCATION/TRAINING PROGRAM

## 2021-07-22 PROCEDURE — 25000003 PHARM REV CODE 250: Performed by: STUDENT IN AN ORGANIZED HEALTH CARE EDUCATION/TRAINING PROGRAM

## 2021-07-22 RX ORDER — SODIUM CHLORIDE 9 MG/ML
INJECTION, SOLUTION INTRAVENOUS CONTINUOUS
Status: CANCELLED | OUTPATIENT
Start: 2021-07-22

## 2021-07-22 RX ORDER — SODIUM CHLORIDE 0.9 % (FLUSH) 0.9 %
10 SYRINGE (ML) INJECTION
Status: CANCELLED | OUTPATIENT
Start: 2021-07-22

## 2021-07-22 RX ORDER — HEPARIN 100 UNIT/ML
5 SYRINGE INTRAVENOUS
Status: DISCONTINUED | OUTPATIENT
Start: 2021-07-22 | End: 2021-07-22 | Stop reason: HOSPADM

## 2021-07-22 RX ORDER — HEPARIN 100 UNIT/ML
5 SYRINGE INTRAVENOUS
Status: CANCELLED | OUTPATIENT
Start: 2021-07-22

## 2021-07-22 RX ORDER — SODIUM CHLORIDE 0.9 % (FLUSH) 0.9 %
10 SYRINGE (ML) INJECTION
Status: DISCONTINUED | OUTPATIENT
Start: 2021-07-22 | End: 2021-07-22 | Stop reason: HOSPADM

## 2021-07-22 RX ORDER — SODIUM CHLORIDE 9 MG/ML
INJECTION, SOLUTION INTRAVENOUS CONTINUOUS
Status: DISCONTINUED | OUTPATIENT
Start: 2021-07-22 | End: 2021-07-22 | Stop reason: HOSPADM

## 2021-07-22 RX ADMIN — SODIUM CHLORIDE: 0.9 INJECTION, SOLUTION INTRAVENOUS at 01:07

## 2021-07-22 RX ADMIN — FERRIC CARBOXYMALTOSE INJECTION 750 MG: 50 INJECTION, SOLUTION INTRAVENOUS at 01:07

## 2021-08-02 ENCOUNTER — OFFICE VISIT (OUTPATIENT)
Dept: RHEUMATOLOGY | Facility: CLINIC | Age: 61
End: 2021-08-02
Payer: MEDICARE

## 2021-08-02 ENCOUNTER — HOSPITAL ENCOUNTER (OUTPATIENT)
Dept: RADIOLOGY | Facility: HOSPITAL | Age: 61
Discharge: HOME | End: 2021-08-02
Attending: INTERNAL MEDICINE
Payer: MEDICARE

## 2021-08-02 VITALS
WEIGHT: 97.25 LBS | DIASTOLIC BLOOD PRESSURE: 59 MMHG | HEART RATE: 63 BPM | SYSTOLIC BLOOD PRESSURE: 95 MMHG | BODY MASS INDEX: 17.9 KG/M2 | HEIGHT: 62 IN

## 2021-08-02 DIAGNOSIS — M25.50 POLYARTHRALGIA: ICD-10-CM

## 2021-08-02 DIAGNOSIS — F41.9 ANXIETY DISORDER, UNSPECIFIED: ICD-10-CM

## 2021-08-02 DIAGNOSIS — M25.50 POLYARTHRALGIA: Primary | ICD-10-CM

## 2021-08-02 PROCEDURE — 77077 JOINT SURVEY SINGLE VIEW: CPT | Mod: 26,,, | Performed by: RADIOLOGY

## 2021-08-02 PROCEDURE — 99999 PR PBB SHADOW E&M-EST. PATIENT-LVL III: CPT | Mod: PBBFAC,,, | Performed by: INTERNAL MEDICINE

## 2021-08-02 PROCEDURE — 3074F SYST BP LT 130 MM HG: CPT | Mod: CPTII,S$GLB,, | Performed by: INTERNAL MEDICINE

## 2021-08-02 PROCEDURE — 77077 XR ARTHRITIS SURVEY: ICD-10-PCS | Mod: 26,,, | Performed by: RADIOLOGY

## 2021-08-02 PROCEDURE — 99205 OFFICE O/P NEW HI 60 MIN: CPT | Mod: S$GLB,,, | Performed by: INTERNAL MEDICINE

## 2021-08-02 PROCEDURE — 77077 JOINT SURVEY SINGLE VIEW: CPT | Mod: TC

## 2021-08-02 PROCEDURE — 99205 PR OFFICE/OUTPT VISIT, NEW, LEVL V, 60-74 MIN: ICD-10-PCS | Mod: S$GLB,,, | Performed by: INTERNAL MEDICINE

## 2021-08-02 PROCEDURE — 3078F DIAST BP <80 MM HG: CPT | Mod: CPTII,S$GLB,, | Performed by: INTERNAL MEDICINE

## 2021-08-02 PROCEDURE — 3074F PR MOST RECENT SYSTOLIC BLOOD PRESSURE < 130 MM HG: ICD-10-PCS | Mod: CPTII,S$GLB,, | Performed by: INTERNAL MEDICINE

## 2021-08-02 PROCEDURE — 1159F PR MEDICATION LIST DOCUMENTED IN MEDICAL RECORD: ICD-10-PCS | Mod: CPTII,S$GLB,, | Performed by: INTERNAL MEDICINE

## 2021-08-02 PROCEDURE — 1125F PR PAIN SEVERITY QUANTIFIED, PAIN PRESENT: ICD-10-PCS | Mod: CPTII,S$GLB,, | Performed by: INTERNAL MEDICINE

## 2021-08-02 PROCEDURE — 3008F PR BODY MASS INDEX (BMI) DOCUMENTED: ICD-10-PCS | Mod: CPTII,S$GLB,, | Performed by: INTERNAL MEDICINE

## 2021-08-02 PROCEDURE — 99999 PR PBB SHADOW E&M-EST. PATIENT-LVL III: ICD-10-PCS | Mod: PBBFAC,,, | Performed by: INTERNAL MEDICINE

## 2021-08-02 PROCEDURE — 1125F AMNT PAIN NOTED PAIN PRSNT: CPT | Mod: CPTII,S$GLB,, | Performed by: INTERNAL MEDICINE

## 2021-08-02 PROCEDURE — 3078F PR MOST RECENT DIASTOLIC BLOOD PRESSURE < 80 MM HG: ICD-10-PCS | Mod: CPTII,S$GLB,, | Performed by: INTERNAL MEDICINE

## 2021-08-02 PROCEDURE — 3008F BODY MASS INDEX DOCD: CPT | Mod: CPTII,S$GLB,, | Performed by: INTERNAL MEDICINE

## 2021-08-02 PROCEDURE — 1159F MED LIST DOCD IN RCRD: CPT | Mod: CPTII,S$GLB,, | Performed by: INTERNAL MEDICINE

## 2021-08-02 RX ORDER — CHOLECALCIFEROL (VITAMIN D3) 1250 MCG
TABLET ORAL
COMMUNITY
Start: 2021-04-09 | End: 2022-09-08 | Stop reason: ALTCHOICE

## 2021-08-09 ENCOUNTER — CLINICAL SUPPORT (OUTPATIENT)
Dept: URGENT CARE | Facility: CLINIC | Age: 61
End: 2021-08-09
Payer: MEDICARE

## 2021-08-09 DIAGNOSIS — Z20.822 EXPOSURE TO COVID-19 VIRUS: Primary | ICD-10-CM

## 2021-08-09 LAB
CTP QC/QA: YES
SARS-COV-2 RDRP RESP QL NAA+PROBE: NEGATIVE

## 2021-08-09 PROCEDURE — U0002: ICD-10-PCS | Mod: QW,S$GLB,, | Performed by: EMERGENCY MEDICINE

## 2021-08-09 PROCEDURE — U0002 COVID-19 LAB TEST NON-CDC: HCPCS | Mod: QW,S$GLB,, | Performed by: EMERGENCY MEDICINE

## 2021-08-18 ENCOUNTER — NUTRITION (OUTPATIENT)
Dept: GASTROENTEROLOGY | Facility: CLINIC | Age: 61
End: 2021-08-18
Payer: MEDICARE

## 2021-08-18 DIAGNOSIS — R63.39 FOOD AVERSION: ICD-10-CM

## 2021-08-18 DIAGNOSIS — R63.4 ABNORMAL WEIGHT LOSS: Primary | ICD-10-CM

## 2021-08-18 DIAGNOSIS — K21.9 GASTROESOPHAGEAL REFLUX DISEASE, UNSPECIFIED WHETHER ESOPHAGITIS PRESENT: ICD-10-CM

## 2021-08-18 DIAGNOSIS — M32.9 SLE (SYSTEMIC LUPUS ERYTHEMATOSUS RELATED SYNDROME): ICD-10-CM

## 2021-08-18 DIAGNOSIS — D50.8 IRON DEFICIENCY ANEMIA SECONDARY TO INADEQUATE DIETARY IRON INTAKE: ICD-10-CM

## 2021-08-18 PROCEDURE — 99999 PR PBB SHADOW E&M-EST. PATIENT-LVL I: CPT | Mod: PBBFAC,,,

## 2021-08-18 PROCEDURE — 99999 PR PBB SHADOW E&M-EST. PATIENT-LVL I: ICD-10-PCS | Mod: PBBFAC,,,

## 2021-08-22 VITALS — BODY MASS INDEX: 16.9 KG/M2 | WEIGHT: 92.38 LBS

## 2021-09-08 ENCOUNTER — LAB VISIT (OUTPATIENT)
Dept: LAB | Facility: OTHER | Age: 61
End: 2021-09-08
Attending: STUDENT IN AN ORGANIZED HEALTH CARE EDUCATION/TRAINING PROGRAM
Payer: MEDICARE

## 2021-09-08 DIAGNOSIS — D50.8 IRON DEFICIENCY ANEMIA SECONDARY TO INADEQUATE DIETARY IRON INTAKE: ICD-10-CM

## 2021-09-08 DIAGNOSIS — D50.8 IRON DEFICIENCY ANEMIA SECONDARY TO INADEQUATE DIETARY IRON INTAKE: Primary | ICD-10-CM

## 2021-09-08 LAB
ALBUMIN SERPL BCP-MCNC: 4.2 G/DL (ref 3.5–5.2)
ALP SERPL-CCNC: 76 U/L (ref 55–135)
ALT SERPL W/O P-5'-P-CCNC: 17 U/L (ref 10–44)
ANION GAP SERPL CALC-SCNC: 6 MMOL/L (ref 8–16)
AST SERPL-CCNC: 21 U/L (ref 10–40)
BASOPHILS # BLD AUTO: 0.02 K/UL (ref 0–0.2)
BASOPHILS NFR BLD: 0.6 % (ref 0–1.9)
BILIRUB SERPL-MCNC: 0.8 MG/DL (ref 0.1–1)
BUN SERPL-MCNC: 5 MG/DL (ref 6–20)
CALCIUM SERPL-MCNC: 9.3 MG/DL (ref 8.7–10.5)
CHLORIDE SERPL-SCNC: 100 MMOL/L (ref 95–110)
CO2 SERPL-SCNC: 30 MMOL/L (ref 23–29)
CREAT SERPL-MCNC: 0.7 MG/DL (ref 0.5–1.4)
DIFFERENTIAL METHOD: ABNORMAL
EOSINOPHIL # BLD AUTO: 0.1 K/UL (ref 0–0.5)
EOSINOPHIL NFR BLD: 1.5 % (ref 0–8)
ERYTHROCYTE [DISTWIDTH] IN BLOOD BY AUTOMATED COUNT: 12.5 % (ref 11.5–14.5)
EST. GFR  (AFRICAN AMERICAN): >60 ML/MIN/1.73 M^2
EST. GFR  (NON AFRICAN AMERICAN): >60 ML/MIN/1.73 M^2
GLUCOSE SERPL-MCNC: 126 MG/DL (ref 70–110)
HCT VFR BLD AUTO: 38.4 % (ref 37–48.5)
HGB BLD-MCNC: 12.7 G/DL (ref 12–16)
IMM GRANULOCYTES # BLD AUTO: 0 K/UL (ref 0–0.04)
IMM GRANULOCYTES NFR BLD AUTO: 0 % (ref 0–0.5)
LDH SERPL L TO P-CCNC: 172 U/L (ref 110–260)
LYMPHOCYTES # BLD AUTO: 1.2 K/UL (ref 1–4.8)
LYMPHOCYTES NFR BLD: 38.1 % (ref 18–48)
MCH RBC QN AUTO: 31.7 PG (ref 27–31)
MCHC RBC AUTO-ENTMCNC: 33.1 G/DL (ref 32–36)
MCV RBC AUTO: 96 FL (ref 82–98)
MONOCYTES # BLD AUTO: 0.3 K/UL (ref 0.3–1)
MONOCYTES NFR BLD: 8 % (ref 4–15)
NEUTROPHILS # BLD AUTO: 1.7 K/UL (ref 1.8–7.7)
NEUTROPHILS NFR BLD: 51.8 % (ref 38–73)
NRBC BLD-RTO: 0 /100 WBC
PLATELET # BLD AUTO: 188 K/UL (ref 150–450)
PMV BLD AUTO: 10.5 FL (ref 9.2–12.9)
POTASSIUM SERPL-SCNC: 3.7 MMOL/L (ref 3.5–5.1)
PROT SERPL-MCNC: 7 G/DL (ref 6–8.4)
RBC # BLD AUTO: 4.01 M/UL (ref 4–5.4)
RETICS/RBC NFR AUTO: 0.9 % (ref 0.5–2.5)
SODIUM SERPL-SCNC: 136 MMOL/L (ref 136–145)
WBC # BLD AUTO: 3.23 K/UL (ref 3.9–12.7)

## 2021-09-08 PROCEDURE — 83615 LACTATE (LD) (LDH) ENZYME: CPT | Performed by: STUDENT IN AN ORGANIZED HEALTH CARE EDUCATION/TRAINING PROGRAM

## 2021-09-08 PROCEDURE — 82728 ASSAY OF FERRITIN: CPT | Performed by: STUDENT IN AN ORGANIZED HEALTH CARE EDUCATION/TRAINING PROGRAM

## 2021-09-08 PROCEDURE — 80053 COMPREHEN METABOLIC PANEL: CPT | Performed by: STUDENT IN AN ORGANIZED HEALTH CARE EDUCATION/TRAINING PROGRAM

## 2021-09-08 PROCEDURE — 84466 ASSAY OF TRANSFERRIN: CPT | Performed by: STUDENT IN AN ORGANIZED HEALTH CARE EDUCATION/TRAINING PROGRAM

## 2021-09-08 PROCEDURE — 85045 AUTOMATED RETICULOCYTE COUNT: CPT | Performed by: STUDENT IN AN ORGANIZED HEALTH CARE EDUCATION/TRAINING PROGRAM

## 2021-09-08 PROCEDURE — 36415 COLL VENOUS BLD VENIPUNCTURE: CPT | Performed by: STUDENT IN AN ORGANIZED HEALTH CARE EDUCATION/TRAINING PROGRAM

## 2021-09-08 PROCEDURE — 85025 COMPLETE CBC W/AUTO DIFF WBC: CPT | Performed by: STUDENT IN AN ORGANIZED HEALTH CARE EDUCATION/TRAINING PROGRAM

## 2021-09-09 ENCOUNTER — OFFICE VISIT (OUTPATIENT)
Dept: HEMATOLOGY/ONCOLOGY | Facility: CLINIC | Age: 61
End: 2021-09-09
Payer: MEDICARE

## 2021-09-09 VITALS
SYSTOLIC BLOOD PRESSURE: 105 MMHG | RESPIRATION RATE: 14 BRPM | BODY MASS INDEX: 17.03 KG/M2 | OXYGEN SATURATION: 99 % | WEIGHT: 92.56 LBS | TEMPERATURE: 99 F | DIASTOLIC BLOOD PRESSURE: 51 MMHG | HEART RATE: 60 BPM | HEIGHT: 62 IN

## 2021-09-09 DIAGNOSIS — D70.8 OTHER NEUTROPENIA: ICD-10-CM

## 2021-09-09 DIAGNOSIS — E43 SEVERE MALNUTRITION: ICD-10-CM

## 2021-09-09 DIAGNOSIS — D50.8 IRON DEFICIENCY ANEMIA SECONDARY TO INADEQUATE DIETARY IRON INTAKE: Primary | ICD-10-CM

## 2021-09-09 LAB
FERRITIN SERPL-MCNC: 869 NG/ML (ref 20–300)
IRON SERPL-MCNC: 125 UG/DL (ref 30–160)
SATURATED IRON: 46 % (ref 20–50)
TOTAL IRON BINDING CAPACITY: 271 UG/DL (ref 250–450)
TRANSFERRIN SERPL-MCNC: 183 MG/DL (ref 200–375)

## 2021-09-09 PROCEDURE — 99999 PR PBB SHADOW E&M-EST. PATIENT-LVL IV: ICD-10-PCS | Mod: PBBFAC,,, | Performed by: STUDENT IN AN ORGANIZED HEALTH CARE EDUCATION/TRAINING PROGRAM

## 2021-09-09 PROCEDURE — 3008F PR BODY MASS INDEX (BMI) DOCUMENTED: ICD-10-PCS | Mod: CPTII,S$GLB,, | Performed by: STUDENT IN AN ORGANIZED HEALTH CARE EDUCATION/TRAINING PROGRAM

## 2021-09-09 PROCEDURE — 3074F SYST BP LT 130 MM HG: CPT | Mod: CPTII,S$GLB,, | Performed by: STUDENT IN AN ORGANIZED HEALTH CARE EDUCATION/TRAINING PROGRAM

## 2021-09-09 PROCEDURE — 3078F DIAST BP <80 MM HG: CPT | Mod: CPTII,S$GLB,, | Performed by: STUDENT IN AN ORGANIZED HEALTH CARE EDUCATION/TRAINING PROGRAM

## 2021-09-09 PROCEDURE — 99213 OFFICE O/P EST LOW 20 MIN: CPT | Mod: S$GLB,,, | Performed by: STUDENT IN AN ORGANIZED HEALTH CARE EDUCATION/TRAINING PROGRAM

## 2021-09-09 PROCEDURE — 99213 PR OFFICE/OUTPT VISIT, EST, LEVL III, 20-29 MIN: ICD-10-PCS | Mod: S$GLB,,, | Performed by: STUDENT IN AN ORGANIZED HEALTH CARE EDUCATION/TRAINING PROGRAM

## 2021-09-09 PROCEDURE — 1160F PR REVIEW ALL MEDS BY PRESCRIBER/CLIN PHARMACIST DOCUMENTED: ICD-10-PCS | Mod: CPTII,S$GLB,, | Performed by: STUDENT IN AN ORGANIZED HEALTH CARE EDUCATION/TRAINING PROGRAM

## 2021-09-09 PROCEDURE — 3078F PR MOST RECENT DIASTOLIC BLOOD PRESSURE < 80 MM HG: ICD-10-PCS | Mod: CPTII,S$GLB,, | Performed by: STUDENT IN AN ORGANIZED HEALTH CARE EDUCATION/TRAINING PROGRAM

## 2021-09-09 PROCEDURE — 3008F BODY MASS INDEX DOCD: CPT | Mod: CPTII,S$GLB,, | Performed by: STUDENT IN AN ORGANIZED HEALTH CARE EDUCATION/TRAINING PROGRAM

## 2021-09-09 PROCEDURE — 3074F PR MOST RECENT SYSTOLIC BLOOD PRESSURE < 130 MM HG: ICD-10-PCS | Mod: CPTII,S$GLB,, | Performed by: STUDENT IN AN ORGANIZED HEALTH CARE EDUCATION/TRAINING PROGRAM

## 2021-09-09 PROCEDURE — 99999 PR PBB SHADOW E&M-EST. PATIENT-LVL IV: CPT | Mod: PBBFAC,,, | Performed by: STUDENT IN AN ORGANIZED HEALTH CARE EDUCATION/TRAINING PROGRAM

## 2021-09-09 PROCEDURE — 1159F MED LIST DOCD IN RCRD: CPT | Mod: CPTII,S$GLB,, | Performed by: STUDENT IN AN ORGANIZED HEALTH CARE EDUCATION/TRAINING PROGRAM

## 2021-09-09 PROCEDURE — 1160F RVW MEDS BY RX/DR IN RCRD: CPT | Mod: CPTII,S$GLB,, | Performed by: STUDENT IN AN ORGANIZED HEALTH CARE EDUCATION/TRAINING PROGRAM

## 2021-09-09 PROCEDURE — 1159F PR MEDICATION LIST DOCUMENTED IN MEDICAL RECORD: ICD-10-PCS | Mod: CPTII,S$GLB,, | Performed by: STUDENT IN AN ORGANIZED HEALTH CARE EDUCATION/TRAINING PROGRAM

## 2021-11-13 ENCOUNTER — TELEPHONE (OUTPATIENT)
Dept: ENDOSCOPY | Facility: HOSPITAL | Age: 61
End: 2021-11-13
Payer: MEDICARE

## 2021-11-23 ENCOUNTER — TELEPHONE (OUTPATIENT)
Dept: GASTROENTEROLOGY | Facility: CLINIC | Age: 61
End: 2021-11-23
Payer: MEDICARE

## 2022-01-09 ENCOUNTER — TELEPHONE (OUTPATIENT)
Dept: ENDOSCOPY | Facility: HOSPITAL | Age: 62
End: 2022-01-09
Payer: MEDICARE

## 2022-01-09 DIAGNOSIS — R11.0 NAUSEA: Primary | ICD-10-CM

## 2022-01-10 RX ORDER — ONDANSETRON HYDROCHLORIDE 8 MG/1
TABLET, FILM COATED ORAL
Qty: 60 TABLET | Refills: 0 | OUTPATIENT
Start: 2022-01-10

## 2022-01-10 RX ORDER — ONDANSETRON 4 MG/1
4 TABLET, ORALLY DISINTEGRATING ORAL EVERY 12 HOURS PRN
Qty: 60 TABLET | Refills: 1 | Status: SHIPPED | OUTPATIENT
Start: 2022-01-10 | End: 2023-03-02 | Stop reason: SDUPTHER

## 2022-01-12 ENCOUNTER — TELEPHONE (OUTPATIENT)
Dept: GASTROENTEROLOGY | Facility: CLINIC | Age: 62
End: 2022-01-12
Payer: MEDICARE

## 2022-01-12 ENCOUNTER — PATIENT MESSAGE (OUTPATIENT)
Dept: GASTROENTEROLOGY | Facility: CLINIC | Age: 62
End: 2022-01-12
Payer: MEDICARE

## 2022-01-12 NOTE — TELEPHONE ENCOUNTER
----- Message from Mary Presley sent at 1/12/2022  2:46 PM CST -----  Regarding: Appointment  Contact: [phone]  Calling to speak with provider or nurse regarding a sooner appointment due to worsening symptoms. Patient stated that she called almost a week ago with no return phone call. Please call sent urgent per patient request.

## 2022-01-12 NOTE — TELEPHONE ENCOUNTER
Return call no answer left message for patient to call the office back.   Sent a message to the patient in the patient portal.

## 2022-02-01 ENCOUNTER — TELEPHONE (OUTPATIENT)
Dept: HEMATOLOGY/ONCOLOGY | Facility: CLINIC | Age: 62
End: 2022-02-01
Payer: MEDICARE

## 2022-02-01 NOTE — TELEPHONE ENCOUNTER
----- Message from Jaclyn Rapp MD sent at 2/1/2022  4:24 PM CST -----  Pt needs labs prior to appt.

## 2022-02-01 NOTE — TELEPHONE ENCOUNTER
Called to spoke with Ms Thurston, No answer. Message left on VM. Informed Ms ChanHarris, before her f/u appointment scheduled on 02/03 with Dr Rapp, we will need to schedule her for lab work to be done prior. Instructed patient to call back to discuss further.

## 2022-02-02 ENCOUNTER — TELEPHONE (OUTPATIENT)
Dept: HEMATOLOGY/ONCOLOGY | Facility: CLINIC | Age: 62
End: 2022-02-02
Payer: MEDICARE

## 2022-02-02 ENCOUNTER — LAB VISIT (OUTPATIENT)
Dept: LAB | Facility: HOSPITAL | Age: 62
End: 2022-02-02
Attending: STUDENT IN AN ORGANIZED HEALTH CARE EDUCATION/TRAINING PROGRAM
Payer: MEDICARE

## 2022-02-02 DIAGNOSIS — D50.8 IRON DEFICIENCY ANEMIA SECONDARY TO INADEQUATE DIETARY IRON INTAKE: ICD-10-CM

## 2022-02-02 LAB
BASOPHILS # BLD AUTO: 0.01 K/UL (ref 0–0.2)
BASOPHILS NFR BLD: 0.3 % (ref 0–1.9)
DIFFERENTIAL METHOD: ABNORMAL
EOSINOPHIL # BLD AUTO: 0.2 K/UL (ref 0–0.5)
EOSINOPHIL NFR BLD: 5.5 % (ref 0–8)
ERYTHROCYTE [DISTWIDTH] IN BLOOD BY AUTOMATED COUNT: 11.9 % (ref 11.5–14.5)
HCT VFR BLD AUTO: 36.3 % (ref 37–48.5)
HGB BLD-MCNC: 11.4 G/DL (ref 12–16)
IMM GRANULOCYTES # BLD AUTO: 0 K/UL (ref 0–0.04)
IMM GRANULOCYTES NFR BLD AUTO: 0 % (ref 0–0.5)
LYMPHOCYTES # BLD AUTO: 1.5 K/UL (ref 1–4.8)
LYMPHOCYTES NFR BLD: 50 % (ref 18–48)
MCH RBC QN AUTO: 31.1 PG (ref 27–31)
MCHC RBC AUTO-ENTMCNC: 31.4 G/DL (ref 32–36)
MCV RBC AUTO: 99 FL (ref 82–98)
MONOCYTES # BLD AUTO: 0.3 K/UL (ref 0.3–1)
MONOCYTES NFR BLD: 9.6 % (ref 4–15)
NEUTROPHILS # BLD AUTO: 1 K/UL (ref 1.8–7.7)
NEUTROPHILS NFR BLD: 34.6 % (ref 38–73)
NRBC BLD-RTO: 0 /100 WBC
PLATELET # BLD AUTO: 216 K/UL (ref 150–450)
PMV BLD AUTO: 11.4 FL (ref 9.2–12.9)
RBC # BLD AUTO: 3.66 M/UL (ref 4–5.4)
RETICS/RBC NFR AUTO: 1.6 % (ref 0.5–2.5)
WBC # BLD AUTO: 2.92 K/UL (ref 3.9–12.7)

## 2022-02-02 PROCEDURE — 36415 COLL VENOUS BLD VENIPUNCTURE: CPT | Mod: PN | Performed by: STUDENT IN AN ORGANIZED HEALTH CARE EDUCATION/TRAINING PROGRAM

## 2022-02-02 PROCEDURE — 80053 COMPREHEN METABOLIC PANEL: CPT | Performed by: STUDENT IN AN ORGANIZED HEALTH CARE EDUCATION/TRAINING PROGRAM

## 2022-02-02 PROCEDURE — 82728 ASSAY OF FERRITIN: CPT | Performed by: STUDENT IN AN ORGANIZED HEALTH CARE EDUCATION/TRAINING PROGRAM

## 2022-02-02 PROCEDURE — 83615 LACTATE (LD) (LDH) ENZYME: CPT | Performed by: STUDENT IN AN ORGANIZED HEALTH CARE EDUCATION/TRAINING PROGRAM

## 2022-02-02 PROCEDURE — 85045 AUTOMATED RETICULOCYTE COUNT: CPT | Performed by: STUDENT IN AN ORGANIZED HEALTH CARE EDUCATION/TRAINING PROGRAM

## 2022-02-02 PROCEDURE — 84466 ASSAY OF TRANSFERRIN: CPT | Performed by: STUDENT IN AN ORGANIZED HEALTH CARE EDUCATION/TRAINING PROGRAM

## 2022-02-02 PROCEDURE — 85025 COMPLETE CBC W/AUTO DIFF WBC: CPT | Performed by: STUDENT IN AN ORGANIZED HEALTH CARE EDUCATION/TRAINING PROGRAM

## 2022-02-02 NOTE — TELEPHONE ENCOUNTER
----- Message from Ana Maria Dillard sent at 2/2/2022  9:06 AM CST -----  Name of Who is Calling: CAROLA, FAHAD          What is the request in detail: The patient is calling to speak to the nurse in regards to the patient stating she needs lab before her appointment. Please advise          Can the clinic reply by [facility]: Yes         What Number to Call Back if not in [facility]: [phone]

## 2022-02-02 NOTE — TELEPHONE ENCOUNTER
Returned call and spoke with Ms AbKaronRaul. Ms Carter returning my missed called. Called Ms Carter yesterday to inform her that lab will be needed before her scheduled appointment with Dr Arredondo on Thursday Feb 7th. When she called back and spoke with staff an appointment was scheduled this afternoon at our [facility]. I explained to Ms Carter, due to the late time her labs are being drawn, Dr Rapp may not have all the results of all the test she has ordered. Ms Carter request we rescheduled the appointment to another day. Done. Appointment reschedules

## 2022-02-03 LAB
ALBUMIN SERPL BCP-MCNC: 3.7 G/DL (ref 3.5–5.2)
ALP SERPL-CCNC: 62 U/L (ref 55–135)
ALT SERPL W/O P-5'-P-CCNC: 14 U/L (ref 10–44)
ANION GAP SERPL CALC-SCNC: 4 MMOL/L (ref 8–16)
AST SERPL-CCNC: 23 U/L (ref 10–40)
BILIRUB SERPL-MCNC: 0.4 MG/DL (ref 0.1–1)
BUN SERPL-MCNC: 10 MG/DL (ref 6–20)
CALCIUM SERPL-MCNC: 9.2 MG/DL (ref 8.7–10.5)
CHLORIDE SERPL-SCNC: 102 MMOL/L (ref 95–110)
CO2 SERPL-SCNC: 31 MMOL/L (ref 23–29)
CREAT SERPL-MCNC: 0.7 MG/DL (ref 0.5–1.4)
EST. GFR  (AFRICAN AMERICAN): >60 ML/MIN/1.73 M^2
EST. GFR  (NON AFRICAN AMERICAN): >60 ML/MIN/1.73 M^2
FERRITIN SERPL-MCNC: 607 NG/ML (ref 20–300)
GLUCOSE SERPL-MCNC: 75 MG/DL (ref 70–110)
IRON SERPL-MCNC: 70 UG/DL (ref 30–160)
LDH SERPL L TO P-CCNC: 160 U/L (ref 110–260)
POTASSIUM SERPL-SCNC: 3.7 MMOL/L (ref 3.5–5.1)
PROT SERPL-MCNC: 6.4 G/DL (ref 6–8.4)
SATURATED IRON: 25 % (ref 20–50)
SODIUM SERPL-SCNC: 137 MMOL/L (ref 136–145)
TOTAL IRON BINDING CAPACITY: 281 UG/DL (ref 250–450)
TRANSFERRIN SERPL-MCNC: 190 MG/DL (ref 200–375)

## 2022-02-06 NOTE — PROGRESS NOTES
Hematology- Oncology Clinic Note :      2/7/2022    RFV / chief complaint- Anemia        HPI  Pt is a 61 y.o. female who  has a past medical history of Anxiety, Arthritis, GERD (gastroesophageal reflux disease), and Neuromuscular disorder.   Pt presents to the clinic today for anemia.     No new complains today. Continues to have diarrhea.   Has made dietary restrictions , may have helped somewhat with gi symptoms like diarrhea and abd pain. .     No recurrent infections.     MMG in [place] - unremarkable. Per pt   Lumpectomy 10 yrs back, path was benign per pt       No smoking   Fhx of cancer- mother lymphoma   Father's mother- cancer  Paternal aunt - breast cancer    Social - lives with mother and     Reviewed past medical/surgical/social history    Past Medical History:   Diagnosis Date    Anxiety     Arthritis     GERD (gastroesophageal reflux disease)     Neuromuscular disorder       Past Surgical History:   Procedure Laterality Date    COLONOSCOPY N/A 8/12/2020    Procedure: COLONOSCOPY;  Surgeon: Cornell Cobb MD;  Location: [facility] ([address] [place] [address]);  Service: Endoscopy;  Laterality: N/A;  Please order CBC day of case    ESOPHAGOGASTRODUODENOSCOPY N/A 8/12/2020    Procedure: EGD (ESOPHAGOGASTRODUODENOSCOPY);  Surgeon: Cornell Cobb MD;  Location: [facility] ([address] [place] [address]);  Service: Endoscopy;  Laterality: N/A;  covid test 8/9-[place] [facility]  labs prior    left breast surgery in 1989 Left       (Not in a hospital admission)    Review of patient's allergies indicates:  No Known Allergies   Social History     Tobacco Use    Smoking status: Never Smoker    Smokeless tobacco: Never Used   Substance Use Topics    Alcohol use: No      Family History   Problem Relation Age of Onset    Cancer Mother     Heart disease Father     Celiac disease Neg Hx     Cirrhosis Neg Hx     Colon cancer Neg Hx     Colon polyps Neg Hx     Crohn's disease Neg Hx     Esophageal cancer Neg Hx      "Inflammatory bowel disease Neg Hx     Liver cancer Neg Hx     Liver disease Neg Hx     Rectal cancer Neg Hx     Stomach cancer Neg Hx     Ulcerative colitis Neg Hx     Juan's disease Neg Hx     Lymphoma Neg Hx           Review of Systems :  Review of Systems   Constitutional: Positive for malaise/fatigue. Negative for chills, diaphoresis, fever and weight loss.   HENT: Negative.  Negative for congestion, hearing loss, nosebleeds, sore throat and tinnitus.    Eyes: Negative.  Negative for blurred vision and discharge.   Respiratory: Negative for cough, hemoptysis, sputum production, shortness of breath and wheezing.    Cardiovascular: Negative.  Negative for chest pain, palpitations and leg swelling.   Gastrointestinal: Positive for abdominal pain and diarrhea. Negative for blood in stool, constipation, heartburn, melena, nausea and vomiting.   Genitourinary: Negative.    Musculoskeletal: Negative.  Negative for back pain, falls, joint pain and myalgias.   Skin: Negative.  Negative for itching and rash.   Neurological: Negative.  Negative for dizziness, tingling, sensory change, speech change, focal weakness, seizures, loss of consciousness, weakness and headaches.   Endo/Heme/Allergies: Negative.  Does not bruise/bleed easily.   Psychiatric/Behavioral: Negative.  Negative for depression. The patient is not nervous/anxious and does not have insomnia.                Physical Exam :  BP (!) 121/57 (BP Location: Left arm, Patient Position: Sitting, BP Method: Small (Automatic))   Pulse (!) 58   Temp 98.3 °F (36.8 °C) (Oral)   Resp 12   Ht 5' 3" (1.6 m)   Wt 44.1 kg (97 lb 3.6 oz)   LMP 10/24/2013   SpO2 100%   BMI 17.22 kg/m²   Wt Readings from Last 3 Encounters:   02/07/22 44.1 kg (97 lb 3.6 oz)   09/09/21 42 kg (92 lb 9.5 oz)   08/18/21 41.9 kg (92 lb 6 oz)       Body mass index is 17.22 kg/m².      Physical Exam  Vitals and nursing note reviewed.   Constitutional:       General: She is not in acute " distress.     Appearance: Normal appearance. She is not ill-appearing.   HENT:      Head: Normocephalic and atraumatic.      Right Ear: External ear normal.      Left Ear: External ear normal.      Mouth/Throat:      Pharynx: No oropharyngeal exudate.   Eyes:      General: No scleral icterus.        Right eye: No discharge.         Left eye: No discharge.   Cardiovascular:      Rate and Rhythm: Normal rate.   Pulmonary:      Effort: Pulmonary effort is normal. No respiratory distress.   Abdominal:      General: There is no distension.   Musculoskeletal:         General: No swelling or deformity.      Cervical back: Normal range of motion and neck supple.      Right lower leg: No edema.      Left lower leg: No edema.   Skin:     Coloration: Skin is not jaundiced.      Findings: No bruising, erythema, lesion or rash.   Neurological:      General: No focal deficit present.      Mental Status: She is alert and oriented to person, place, and time. Mental status is at baseline.      Coordination: Coordination normal.      Gait: Gait normal.   Psychiatric:         Mood and Affect: Mood normal.         Behavior: Behavior normal.             Current Outpatient Medications   Medication Sig Dispense Refill    cholecalciferol, vitamin D3, (DIALYVITE VITAMIN D3 MAX) 1,250 mcg (50,000 unit) Tab TAKE 1 TABLET BY MOUTH 1 TIME A WEEK FOR 12 WEEKS      cyanocobalamin 1,000 mcg/mL injection INJECT 1 ML INTO THE SKIN ONCE A WEEK      dicyclomine (BENTYL) 10 MG capsule TK 1 C PO QID PRN      hydroxychloroquine (PLAQUENIL) 200 mg tablet Take 200 mg by mouth once daily.      LORazepam (ATIVAN) 1 MG tablet Take 1 mg by mouth 2 (two) times daily.      methocarbamol (ROBAXIN) 500 MG Tab Take 500 mg by mouth 3 (three) times daily as needed.      ondansetron (ZOFRAN) 8 MG tablet TAKE 1 TABLET(8 MG) BY MOUTH EVERY 12 HOURS AS NEEDED FOR NAUSEA 60 tablet 0    ondansetron (ZOFRAN-ODT) 4 MG TbDL Take 1 tablet (4 mg total) by mouth every 12  (twelve) hours as needed (Nausea). 60 tablet 1    oxycodone-acetaminophen (PERCOCET) 5-325 mg per tablet Take 1 tablet by mouth every 4 (four) hours as needed for Pain.      pantoprazole (PROTONIX) 20 MG tablet Take 20 mg by mouth 2 (two) times daily before meals.      sotalol (BETAPACE) 80 MG tablet Take 80 mg by mouth 2 (two) times daily.      zolpidem (AMBIEN) 10 mg Tab Take 10 mg by mouth nightly as needed.      (Magic mouthwash) 1:1:1 Benadryl 12.5mg/5ml liq, aluminum & magnesium hydroxide-simehticone (Maalox), LIDOcaine viscous 2% Swish and spit 10 mLs every 4 (four) hours as needed (sore throat). for sore throat (Patient not taking: Reported on 2/7/2022) 360 mL 0    famotidine (PEPCID) 20 MG tablet TK 1 T PO BID      ferrous sulfate (FEOSOL) 325 mg (65 mg iron) Tab tablet Take 1 tablet (325 mg total) by mouth every 12 (twelve) hours. (Patient not taking: Reported on 2/7/2022) 60 tablet 4    lidocaine HCl 2% (XYLOCAINE) 2 % Soln GARGLE AND SPIT 1 TEASPOON EVERY 6 HOURS AS NEEDED FOR SORE THROAT      ondansetron (ZOFRAN) 4 MG tablet TAKE 1 TABLET(4 MG) BY MOUTH EVERY 12 HOURS AS NEEDED FOR NAUSEA 60 tablet 0     No current facility-administered medications for this visit.       Pertinent Diagnostic studies:      Lab Visit on 02/02/2022   Component Date Value Ref Range Status    WBC 02/02/2022 2.92* 3.90 - 12.70 K/uL Final    RBC 02/02/2022 3.66* 4.00 - 5.40 M/uL Final    Hemoglobin 02/02/2022 11.4* 12.0 - 16.0 g/dL Final    Hematocrit 02/02/2022 36.3* 37.0 - 48.5 % Final    MCV 02/02/2022 99* 82 - 98 fL Final    MCH 02/02/2022 31.1* 27.0 - 31.0 pg Final    MCHC 02/02/2022 31.4* 32.0 - 36.0 g/dL Final    RDW 02/02/2022 11.9  11.5 - 14.5 % Final    Platelets 02/02/2022 216  150 - 450 K/uL Final    MPV 02/02/2022 11.4  9.2 - 12.9 fL Final    Immature Granulocytes 02/02/2022 0.0  0.0 - 0.5 % Final    Gran # (ANC) 02/02/2022 1.0* 1.8 - 7.7 K/uL Final    Immature Grans (Abs) 02/02/2022 0.00   0.00 - 0.04 K/uL Final    Comment: Mild elevation in immature granulocytes is non specific and   can be seen in a variety of conditions including stress response,   acute inflammation, trauma and pregnancy. Correlation with other   laboratory and clinical findings is essential.      Lymph # 02/02/2022 1.5  1.0 - 4.8 K/uL Final    Mono # 02/02/2022 0.3  0.3 - 1.0 K/uL Final    Eos # 02/02/2022 0.2  0.0 - 0.5 K/uL Final    Baso # 02/02/2022 0.01  0.00 - 0.20 K/uL Final    nRBC 02/02/2022 0  0 /100 WBC Final    Gran % 02/02/2022 34.6* 38.0 - 73.0 % Final    Lymph % 02/02/2022 50.0* 18.0 - 48.0 % Final    Mono % 02/02/2022 9.6  4.0 - 15.0 % Final    Eosinophil % 02/02/2022 5.5  0.0 - 8.0 % Final    Basophil % 02/02/2022 0.3  0.0 - 1.9 % Final    Differential Method 02/02/2022 Automated   Final    Sodium 02/02/2022 137  136 - 145 mmol/L Final    Potassium 02/02/2022 3.7  3.5 - 5.1 mmol/L Final    Chloride 02/02/2022 102  95 - 110 mmol/L Final    CO2 02/02/2022 31* 23 - 29 mmol/L Final    Glucose 02/02/2022 75  70 - 110 mg/dL Final    BUN 02/02/2022 10  6 - 20 mg/dL Final    Creatinine 02/02/2022 0.7  0.5 - 1.4 mg/dL Final    Calcium 02/02/2022 9.2  8.7 - 10.5 mg/dL Final    Total Protein 02/02/2022 6.4  6.0 - 8.4 g/dL Final    Albumin 02/02/2022 3.7  3.5 - 5.2 g/dL Final    Total Bilirubin 02/02/2022 0.4  0.1 - 1.0 mg/dL Final    Comment: For infants and newborns, interpretation of results should be based  on gestational age, weight and in agreement with clinical  observations.    Premature Infant recommended reference ranges:  Up to 24 hours.............<8.0 mg/dL  Up to 48 hours............<12.0 mg/dL  3-5 days..................<15.0 mg/dL  6-29 days.................<15.0 mg/dL      Alkaline Phosphatase 02/02/2022 62  55 - 135 U/L Final    AST 02/02/2022 23  10 - 40 U/L Final    ALT 02/02/2022 14  10 - 44 U/L Final    Anion Gap 02/02/2022 4* 8 - 16 mmol/L Final    eGFR if   02/02/2022 >60.0  >60 mL/min/1.73 m^2 Final    eGFR if non African American 02/02/2022 >60.0  >60 mL/min/1.73 m^2 Final    Comment: Calculation used to obtain the estimated glomerular filtration  rate (eGFR) is the CKD-EPI equation.       Retic 02/02/2022 1.6  0.5 - 2.5 % Final    LD 02/02/2022 160  110 - 260 U/L Final    Results are increased in hemolyzed samples.    Ferritin 02/02/2022 607* 20.0 - 300.0 ng/mL Final    Iron 02/02/2022 70  30 - 160 ug/dL Final    Transferrin 02/02/2022 190* 200 - 375 mg/dL Final    TIBC 02/02/2022 281  250 - 450 ug/dL Final    Saturated Iron 02/02/2022 25  20 - 50 % Final                 Oncology History    No history exists.     Assessment :       1. Iron deficiency anemia secondary to inadequate dietary iron intake    2. SLE (systemic lupus erythematosus related syndrome)    3. Functional diarrhea    4. Severe malnutrition    5. Other neutropenia        Plan :     CRESCENCIO  8/12/2020- EGD/ C scope- unremarkable, Dr Cobb)  injectafer x 2 given in July 2021 with improvement in hb and iron stores  Labs from this visit with mild anemia which is likely due to chronic ds   RTC 6 months with repeat labs .     Neutropenia -moderate, ANC around 1000. Pt asymp. Could be ethnical/ constitutional vs related to lupus or lupus meds. monitor    Severe malnutrition - stable   Body mass index is 17.22 kg/m².  -CT scans reviewed- no evidence of malignancy.   -continue high calorie high protein diet   -f/u with pcp/ gi     Diarrhea- f/u with gi     Sle- per rheuma , on plaquenil     Route Chart for Scheduling    Med Onc Chart Routing  Follow up with physician 6 months.   Follow up with [facility]    Labs CBC, ferritin and folate   Lab interval:     Imaging None      Pharmacy appointment No pharmacy appointment needed      Other referrals No additional referrals needed              Problem List Items Addressed This Visit     Diarrhea    Iron deficiency anemia secondary to inadequate dietary iron  intake - Primary    Severe malnutrition    SLE (systemic lupus erythematosus related syndrome)      Other Visit Diagnoses     Other neutropenia                  Electronically signed by Jaclyn Rapp    [facility]      Future Appointments   Date Time Provider Department Center   3/9/2022  1:00 PM Jaclyn Rapp MD [facility] HEM [facility]   3/16/2022  4:00 PM Cornell Cobb MD [facility] GASTRO [place]           This note was created with voice recognition software.  Grammatical, syntax and spelling errors may be inevitable.

## 2022-02-07 ENCOUNTER — OFFICE VISIT (OUTPATIENT)
Dept: HEMATOLOGY/ONCOLOGY | Facility: CLINIC | Age: 62
End: 2022-02-07
Payer: MEDICARE

## 2022-02-07 VITALS
BODY MASS INDEX: 17.23 KG/M2 | SYSTOLIC BLOOD PRESSURE: 121 MMHG | RESPIRATION RATE: 12 BRPM | HEIGHT: 63 IN | HEART RATE: 58 BPM | DIASTOLIC BLOOD PRESSURE: 57 MMHG | WEIGHT: 97.25 LBS | OXYGEN SATURATION: 100 % | TEMPERATURE: 98 F

## 2022-02-07 DIAGNOSIS — E43 SEVERE MALNUTRITION: ICD-10-CM

## 2022-02-07 DIAGNOSIS — D53.9 NUTRITIONAL ANEMIA: ICD-10-CM

## 2022-02-07 DIAGNOSIS — D70.8 OTHER NEUTROPENIA: ICD-10-CM

## 2022-02-07 DIAGNOSIS — K59.1 FUNCTIONAL DIARRHEA: ICD-10-CM

## 2022-02-07 DIAGNOSIS — D50.8 IRON DEFICIENCY ANEMIA SECONDARY TO INADEQUATE DIETARY IRON INTAKE: Primary | ICD-10-CM

## 2022-02-07 DIAGNOSIS — M32.9 SLE (SYSTEMIC LUPUS ERYTHEMATOSUS RELATED SYNDROME): ICD-10-CM

## 2022-02-07 PROCEDURE — 1159F PR MEDICATION LIST DOCUMENTED IN MEDICAL RECORD: ICD-10-PCS | Mod: CPTII,S$GLB,, | Performed by: STUDENT IN AN ORGANIZED HEALTH CARE EDUCATION/TRAINING PROGRAM

## 2022-02-07 PROCEDURE — 99999 PR PBB SHADOW E&M-EST. PATIENT-LVL V: ICD-10-PCS | Mod: PBBFAC,,, | Performed by: STUDENT IN AN ORGANIZED HEALTH CARE EDUCATION/TRAINING PROGRAM

## 2022-02-07 PROCEDURE — 1160F PR REVIEW ALL MEDS BY PRESCRIBER/CLIN PHARMACIST DOCUMENTED: ICD-10-PCS | Mod: CPTII,S$GLB,, | Performed by: STUDENT IN AN ORGANIZED HEALTH CARE EDUCATION/TRAINING PROGRAM

## 2022-02-07 PROCEDURE — 99999 PR PBB SHADOW E&M-EST. PATIENT-LVL V: CPT | Mod: PBBFAC,,, | Performed by: STUDENT IN AN ORGANIZED HEALTH CARE EDUCATION/TRAINING PROGRAM

## 2022-02-07 PROCEDURE — 3074F PR MOST RECENT SYSTOLIC BLOOD PRESSURE < 130 MM HG: ICD-10-PCS | Mod: CPTII,S$GLB,, | Performed by: STUDENT IN AN ORGANIZED HEALTH CARE EDUCATION/TRAINING PROGRAM

## 2022-02-07 PROCEDURE — 99214 OFFICE O/P EST MOD 30 MIN: CPT | Mod: S$GLB,,, | Performed by: STUDENT IN AN ORGANIZED HEALTH CARE EDUCATION/TRAINING PROGRAM

## 2022-02-07 PROCEDURE — 3078F PR MOST RECENT DIASTOLIC BLOOD PRESSURE < 80 MM HG: ICD-10-PCS | Mod: CPTII,S$GLB,, | Performed by: STUDENT IN AN ORGANIZED HEALTH CARE EDUCATION/TRAINING PROGRAM

## 2022-02-07 PROCEDURE — 1159F MED LIST DOCD IN RCRD: CPT | Mod: CPTII,S$GLB,, | Performed by: STUDENT IN AN ORGANIZED HEALTH CARE EDUCATION/TRAINING PROGRAM

## 2022-02-07 PROCEDURE — 3008F BODY MASS INDEX DOCD: CPT | Mod: CPTII,S$GLB,, | Performed by: STUDENT IN AN ORGANIZED HEALTH CARE EDUCATION/TRAINING PROGRAM

## 2022-02-07 PROCEDURE — 3078F DIAST BP <80 MM HG: CPT | Mod: CPTII,S$GLB,, | Performed by: STUDENT IN AN ORGANIZED HEALTH CARE EDUCATION/TRAINING PROGRAM

## 2022-02-07 PROCEDURE — 99214 PR OFFICE/OUTPT VISIT, EST, LEVL IV, 30-39 MIN: ICD-10-PCS | Mod: S$GLB,,, | Performed by: STUDENT IN AN ORGANIZED HEALTH CARE EDUCATION/TRAINING PROGRAM

## 2022-02-07 PROCEDURE — 3008F PR BODY MASS INDEX (BMI) DOCUMENTED: ICD-10-PCS | Mod: CPTII,S$GLB,, | Performed by: STUDENT IN AN ORGANIZED HEALTH CARE EDUCATION/TRAINING PROGRAM

## 2022-02-07 PROCEDURE — 3074F SYST BP LT 130 MM HG: CPT | Mod: CPTII,S$GLB,, | Performed by: STUDENT IN AN ORGANIZED HEALTH CARE EDUCATION/TRAINING PROGRAM

## 2022-02-07 PROCEDURE — 1160F RVW MEDS BY RX/DR IN RCRD: CPT | Mod: CPTII,S$GLB,, | Performed by: STUDENT IN AN ORGANIZED HEALTH CARE EDUCATION/TRAINING PROGRAM

## 2022-02-23 DIAGNOSIS — D84.9 IMMUNOSUPPRESSED STATUS: ICD-10-CM

## 2022-03-16 ENCOUNTER — TELEPHONE (OUTPATIENT)
Dept: GASTROENTEROLOGY | Facility: CLINIC | Age: 62
End: 2022-03-16
Payer: MEDICARE

## 2022-03-16 NOTE — TELEPHONE ENCOUNTER
----- Message from Isaiah López sent at 3/16/2022  3:55 PM CDT -----  Regarding: Pt. Advise  Contact: patient  Pt. Called stating she will be a few minutes late for appt.           Pt. @[phone]

## 2022-03-16 NOTE — TELEPHONE ENCOUNTER
Return call to patient to inform that she will if she can make it to the appointment by 4:15 will be able to see her.But if she make it after will need to reschedule her appointment.     Patient stated that she will try to make it.

## 2022-08-19 ENCOUNTER — LAB VISIT (OUTPATIENT)
Dept: LAB | Facility: OTHER | Age: 62
End: 2022-08-19
Attending: STUDENT IN AN ORGANIZED HEALTH CARE EDUCATION/TRAINING PROGRAM
Payer: MEDICARE

## 2022-08-19 DIAGNOSIS — D53.9 NUTRITIONAL ANEMIA: ICD-10-CM

## 2022-08-19 LAB
ERYTHROCYTE [DISTWIDTH] IN BLOOD BY AUTOMATED COUNT: 12.1 % (ref 11.5–14.5)
FERRITIN SERPL-MCNC: 642 NG/ML (ref 20–300)
FOLATE SERPL-MCNC: 12.6 NG/ML (ref 4–24)
HCT VFR BLD AUTO: 35.6 % (ref 37–48.5)
HGB BLD-MCNC: 11.7 G/DL (ref 12–16)
IMM GRANULOCYTES # BLD AUTO: 0.01 K/UL (ref 0–0.04)
MCH RBC QN AUTO: 31.5 PG (ref 27–31)
MCHC RBC AUTO-ENTMCNC: 32.9 G/DL (ref 32–36)
MCV RBC AUTO: 96 FL (ref 82–98)
NEUTROPHILS # BLD AUTO: 1.4 K/UL (ref 1.8–7.7)
PLATELET # BLD AUTO: 229 K/UL (ref 150–450)
PMV BLD AUTO: 9.4 FL (ref 9.2–12.9)
RBC # BLD AUTO: 3.72 M/UL (ref 4–5.4)
WBC # BLD AUTO: 2.81 K/UL (ref 3.9–12.7)

## 2022-08-19 PROCEDURE — 85027 COMPLETE CBC AUTOMATED: CPT | Performed by: STUDENT IN AN ORGANIZED HEALTH CARE EDUCATION/TRAINING PROGRAM

## 2022-08-19 PROCEDURE — 82746 ASSAY OF FOLIC ACID SERUM: CPT | Performed by: STUDENT IN AN ORGANIZED HEALTH CARE EDUCATION/TRAINING PROGRAM

## 2022-08-19 PROCEDURE — 82728 ASSAY OF FERRITIN: CPT | Performed by: STUDENT IN AN ORGANIZED HEALTH CARE EDUCATION/TRAINING PROGRAM

## 2022-08-19 PROCEDURE — 36415 COLL VENOUS BLD VENIPUNCTURE: CPT | Performed by: STUDENT IN AN ORGANIZED HEALTH CARE EDUCATION/TRAINING PROGRAM

## 2022-09-08 ENCOUNTER — TELEPHONE (OUTPATIENT)
Dept: HEMATOLOGY/ONCOLOGY | Facility: CLINIC | Age: 62
End: 2022-09-08
Payer: MEDICARE

## 2022-09-08 ENCOUNTER — OFFICE VISIT (OUTPATIENT)
Dept: HEMATOLOGY/ONCOLOGY | Facility: CLINIC | Age: 62
End: 2022-09-08
Payer: MEDICARE

## 2022-09-08 VITALS
WEIGHT: 100.06 LBS | DIASTOLIC BLOOD PRESSURE: 53 MMHG | RESPIRATION RATE: 16 BRPM | OXYGEN SATURATION: 97 % | SYSTOLIC BLOOD PRESSURE: 109 MMHG | TEMPERATURE: 99 F | HEIGHT: 63 IN | HEART RATE: 75 BPM | BODY MASS INDEX: 17.73 KG/M2

## 2022-09-08 DIAGNOSIS — D50.8 IRON DEFICIENCY ANEMIA SECONDARY TO INADEQUATE DIETARY IRON INTAKE: Primary | ICD-10-CM

## 2022-09-08 DIAGNOSIS — D70.8 OTHER NEUTROPENIA: ICD-10-CM

## 2022-09-08 DIAGNOSIS — M32.9 SLE (SYSTEMIC LUPUS ERYTHEMATOSUS RELATED SYNDROME): ICD-10-CM

## 2022-09-08 PROCEDURE — 3078F PR MOST RECENT DIASTOLIC BLOOD PRESSURE < 80 MM HG: ICD-10-PCS | Mod: CPTII,S$GLB,, | Performed by: STUDENT IN AN ORGANIZED HEALTH CARE EDUCATION/TRAINING PROGRAM

## 2022-09-08 PROCEDURE — 3008F PR BODY MASS INDEX (BMI) DOCUMENTED: ICD-10-PCS | Mod: CPTII,S$GLB,, | Performed by: STUDENT IN AN ORGANIZED HEALTH CARE EDUCATION/TRAINING PROGRAM

## 2022-09-08 PROCEDURE — 1159F PR MEDICATION LIST DOCUMENTED IN MEDICAL RECORD: ICD-10-PCS | Mod: CPTII,S$GLB,, | Performed by: STUDENT IN AN ORGANIZED HEALTH CARE EDUCATION/TRAINING PROGRAM

## 2022-09-08 PROCEDURE — 1160F PR REVIEW ALL MEDS BY PRESCRIBER/CLIN PHARMACIST DOCUMENTED: ICD-10-PCS | Mod: CPTII,S$GLB,, | Performed by: STUDENT IN AN ORGANIZED HEALTH CARE EDUCATION/TRAINING PROGRAM

## 2022-09-08 PROCEDURE — 1159F MED LIST DOCD IN RCRD: CPT | Mod: CPTII,S$GLB,, | Performed by: STUDENT IN AN ORGANIZED HEALTH CARE EDUCATION/TRAINING PROGRAM

## 2022-09-08 PROCEDURE — 3074F PR MOST RECENT SYSTOLIC BLOOD PRESSURE < 130 MM HG: ICD-10-PCS | Mod: CPTII,S$GLB,, | Performed by: STUDENT IN AN ORGANIZED HEALTH CARE EDUCATION/TRAINING PROGRAM

## 2022-09-08 PROCEDURE — 99213 OFFICE O/P EST LOW 20 MIN: CPT | Mod: S$GLB,,, | Performed by: STUDENT IN AN ORGANIZED HEALTH CARE EDUCATION/TRAINING PROGRAM

## 2022-09-08 PROCEDURE — 1160F RVW MEDS BY RX/DR IN RCRD: CPT | Mod: CPTII,S$GLB,, | Performed by: STUDENT IN AN ORGANIZED HEALTH CARE EDUCATION/TRAINING PROGRAM

## 2022-09-08 PROCEDURE — 3078F DIAST BP <80 MM HG: CPT | Mod: CPTII,S$GLB,, | Performed by: STUDENT IN AN ORGANIZED HEALTH CARE EDUCATION/TRAINING PROGRAM

## 2022-09-08 PROCEDURE — 3074F SYST BP LT 130 MM HG: CPT | Mod: CPTII,S$GLB,, | Performed by: STUDENT IN AN ORGANIZED HEALTH CARE EDUCATION/TRAINING PROGRAM

## 2022-09-08 PROCEDURE — 99213 PR OFFICE/OUTPT VISIT, EST, LEVL III, 20-29 MIN: ICD-10-PCS | Mod: S$GLB,,, | Performed by: STUDENT IN AN ORGANIZED HEALTH CARE EDUCATION/TRAINING PROGRAM

## 2022-09-08 PROCEDURE — 3008F BODY MASS INDEX DOCD: CPT | Mod: CPTII,S$GLB,, | Performed by: STUDENT IN AN ORGANIZED HEALTH CARE EDUCATION/TRAINING PROGRAM

## 2022-09-08 NOTE — PROGRESS NOTES
Hematology- Oncology Clinic Note :      9/8/2022    RFV / chief complaint- Follow-up and Iron deficiency anemia secondary to inadequate dietary iron        HPI  Pt is a 61 y.o. female who  has a past medical history of Anxiety, Arthritis, GERD (gastroesophageal reflux disease), and Neuromuscular disorder.   Pt presents to the clinic today for anemia.     In usual state of health  No new complains today.   No recurrent infections.     MMG in [place] - unremarkable. Per pt   Lumpectomy 10 yrs back, path was benign per pt       No smoking   Fhx of cancer- mother lymphoma   Father's mother- cancer  Paternal aunt - breast cancer    Social - lives with mother and     Reviewed past medical/surgical/social history    Past Medical History:   Diagnosis Date    Anxiety     Arthritis     GERD (gastroesophageal reflux disease)     Neuromuscular disorder       Past Surgical History:   Procedure Laterality Date    COLONOSCOPY N/A 8/12/2020    Procedure: COLONOSCOPY;  Surgeon: Cornell Cobb MD;  Location: [facility] ([address] [place] [address]);  Service: Endoscopy;  Laterality: N/A;  Please order CBC day of case    ESOPHAGOGASTRODUODENOSCOPY N/A 8/12/2020    Procedure: EGD (ESOPHAGOGASTRODUODENOSCOPY);  Surgeon: Cornell Cobb MD;  Location: [facility] ([address] [place] [address]);  Service: Endoscopy;  Laterality: N/A;  covid test 8/9-[place] [facility]  labs prior    left breast surgery in 1989 Left       (Not in a hospital admission)    Review of patient's allergies indicates:  No Known Allergies   Social History     Tobacco Use    Smoking status: Never    Smokeless tobacco: Never   Substance Use Topics    Alcohol use: No      Family History   Problem Relation Age of Onset    Cancer Mother     Heart disease Father     Celiac disease Neg Hx     Cirrhosis Neg Hx     Colon cancer Neg Hx     Colon polyps Neg Hx     Crohn's disease Neg Hx     Esophageal cancer Neg Hx     Inflammatory bowel disease Neg Hx     Liver cancer Neg Hx     Liver disease Neg Hx   "   Rectal cancer Neg Hx     Stomach cancer Neg Hx     Ulcerative colitis Neg Hx     Juan's disease Neg Hx     Lymphoma Neg Hx           Review of Systems :  Review of Systems   Constitutional:  Positive for malaise/fatigue. Negative for chills, diaphoresis, fever and weight loss.   HENT: Negative.  Negative for congestion, hearing loss, nosebleeds, sore throat and tinnitus.    Eyes: Negative.  Negative for blurred vision and discharge.   Respiratory:  Negative for cough, hemoptysis, sputum production, shortness of breath and wheezing.    Cardiovascular: Negative.  Negative for chest pain, palpitations and leg swelling.   Gastrointestinal:  Positive for abdominal pain and diarrhea. Negative for blood in stool, constipation, heartburn, melena, nausea and vomiting.   Genitourinary: Negative.    Musculoskeletal: Negative.  Negative for back pain, falls, joint pain and myalgias.   Skin: Negative.  Negative for itching and rash.   Neurological: Negative.  Negative for dizziness, tingling, sensory change, speech change, focal weakness, seizures, loss of consciousness, weakness and headaches.   Endo/Heme/Allergies: Negative.  Does not bruise/bleed easily.   Psychiatric/Behavioral: Negative.  Negative for depression. The patient is not nervous/anxious and does not have insomnia.              Physical Exam :  BP (!) 109/53 (BP Location: Left arm, Patient Position: Sitting, BP Method: Small (Automatic))   Pulse 75   Temp 99.1 °F (37.3 °C) (Oral)   Resp 16   Ht 5' 3" (1.6 m)   Wt 45.4 kg (100 lb 1.4 oz)   LMP 10/24/2013   SpO2 97%   BMI 17.73 kg/m²   Wt Readings from Last 3 Encounters:   09/08/22 45.4 kg (100 lb 1.4 oz)   02/07/22 44.1 kg (97 lb 3.6 oz)   09/09/21 42 kg (92 lb 9.5 oz)       Body mass index is 17.73 kg/m².      Physical Exam  Vitals and nursing note reviewed.   Constitutional:       General: She is not in acute distress.     Appearance: Normal appearance. She is not ill-appearing.   HENT:      Head: " Normocephalic and atraumatic.      Right Ear: External ear normal.      Left Ear: External ear normal.      Mouth/Throat:      Pharynx: No oropharyngeal exudate.   Eyes:      General: No scleral icterus.        Right eye: No discharge.         Left eye: No discharge.   Cardiovascular:      Rate and Rhythm: Normal rate.   Pulmonary:      Effort: Pulmonary effort is normal. No respiratory distress.   Abdominal:      General: There is no distension.   Musculoskeletal:         General: No swelling or deformity.      Cervical back: Normal range of motion and neck supple.      Right lower leg: No edema.      Left lower leg: No edema.   Skin:     Coloration: Skin is not jaundiced.      Findings: No bruising, erythema, lesion or rash.   Neurological:      General: No focal deficit present.      Mental Status: She is alert and oriented to person, place, and time. Mental status is at baseline.      Coordination: Coordination normal.      Gait: Gait normal.   Psychiatric:         Mood and Affect: Mood normal.         Behavior: Behavior normal.           Current Outpatient Medications   Medication Sig Dispense Refill    cyanocobalamin 1,000 mcg/mL injection INJECT 1 ML INTO THE SKIN ONCE A WEEK      dicyclomine (BENTYL) 10 MG capsule TK 1 C PO QID PRN      famotidine (PEPCID) 20 MG tablet TK 1 T PO BID      ferrous sulfate (FEOSOL) 325 mg (65 mg iron) Tab tablet Take 1 tablet (325 mg total) by mouth every 12 (twelve) hours. 60 tablet 4    hydroxychloroquine (PLAQUENIL) 200 mg tablet Take 200 mg by mouth once daily.      lidocaine HCl 2% (XYLOCAINE) 2 % Soln GARGLE AND SPIT 1 TEASPOON EVERY 6 HOURS AS NEEDED FOR SORE THROAT      LORazepam (ATIVAN) 1 MG tablet Take 1 mg by mouth 2 (two) times daily.      methocarbamol (ROBAXIN) 500 MG Tab Take 500 mg by mouth 3 (three) times daily as needed.      ondansetron (ZOFRAN) 4 MG tablet TAKE 1 TABLET(4 MG) BY MOUTH EVERY 12 HOURS AS NEEDED FOR NAUSEA 60 tablet 0    ondansetron (ZOFRAN) 8  MG tablet TAKE 1 TABLET(8 MG) BY MOUTH EVERY 12 HOURS AS NEEDED FOR NAUSEA 60 tablet 0    ondansetron (ZOFRAN-ODT) 4 MG TbDL Take 1 tablet (4 mg total) by mouth every 12 (twelve) hours as needed (Nausea). 60 tablet 1    oxycodone-acetaminophen (PERCOCET) 5-325 mg per tablet Take 1 tablet by mouth every 4 (four) hours as needed for Pain.      pantoprazole (PROTONIX) 20 MG tablet Take 20 mg by mouth 2 (two) times daily before meals.      sotalol (BETAPACE) 80 MG tablet Take 80 mg by mouth 2 (two) times daily.      zolpidem (AMBIEN) 10 mg Tab Take 10 mg by mouth nightly as needed.       No current facility-administered medications for this visit.       Pertinent Diagnostic studies:      No visits with results within 1 Week(s) from this visit.   Latest known visit with results is:   Lab Visit on 08/19/2022   Component Date Value Ref Range Status    Ferritin 08/19/2022 642 (H)  20.0 - 300.0 ng/mL Final    Folate 08/19/2022 12.6  4.0 - 24.0 ng/mL Final    WBC 08/19/2022 2.81 (L)  3.90 - 12.70 K/uL Final    RBC 08/19/2022 3.72 (L)  4.00 - 5.40 M/uL Final    Hemoglobin 08/19/2022 11.7 (L)  12.0 - 16.0 g/dL Final    Hematocrit 08/19/2022 35.6 (L)  37.0 - 48.5 % Final    MCV 08/19/2022 96  82 - 98 fL Final    MCH 08/19/2022 31.5 (H)  27.0 - 31.0 pg Final    MCHC 08/19/2022 32.9  32.0 - 36.0 g/dL Final    RDW 08/19/2022 12.1  11.5 - 14.5 % Final    Platelets 08/19/2022 229  150 - 450 K/uL Final    MPV 08/19/2022 9.4  9.2 - 12.9 fL Final    Gran # (ANC) 08/19/2022 1.4 (L)  1.8 - 7.7 K/uL Final    Comment: The ANC is based on a white cell differential from an   automated cell counter. It has not been microscopically   reviewed for the presence of abnormal cells. Clinical   correlation is required.      Immature Grans (Abs) 08/19/2022 0.01  0.00 - 0.04 K/uL Final    Comment: Mild elevation in immature granulocytes is non specific and   can be seen in a variety of conditions including stress response,   acute inflammation, trauma  and pregnancy. Correlation with other   laboratory and clinical findings is essential.                   Oncology History    No history exists.     Assessment :       1. Iron deficiency anemia secondary to inadequate dietary iron intake    2. SLE (systemic lupus erythematosus related syndrome)    3. Other neutropenia        Plan :     CRESCENCIO  8/12/2020- EGD/ C scope- unremarkable, Dr Cobb)  injectafer x 2 given in July 2021 with improvement in hb and iron stores  Labs from this visit with mild anemia which is likely due to chronic ds   RTC 6 months with repeat labs .     Neutropenia -moderate, ANC > 1000. Pt asymp. Could be ethnical/ constitutional vs related to lupus or lupus meds. monitor    Severe malnutrition - stable   Body mass index is 17.73 kg/m².  -CT scans reviewed- no evidence of malignancy.   -continue high calorie high protein diet   -f/u with pcp/ gi     Diarrhea- f/u with gi     Sle- per rheuma , on plaquenil     Route Chart for Scheduling           Problem List Items Addressed This Visit       Iron deficiency anemia secondary to inadequate dietary iron intake - Primary    SLE (systemic lupus erythematosus related syndrome)     Other Visit Diagnoses       Other neutropenia                    Electronically signed by Jaclyn Rapp    [facility]      No future appointments.          This note was created with voice recognition software.  Grammatical, syntax and spelling errors may be inevitable.

## 2022-09-08 NOTE — TELEPHONE ENCOUNTER
Returned call. Patient states she is running late she'll  be here in about 10 mins.     ----- Message from Ena Perez sent at 9/8/2022  1:46 PM CDT -----  Regarding: Pt called to see if she can stil come in for her 1:30 pm appt due to running late and pt would like a call back today asa  Same Day Appointment Access Request:    Pt called to see if she can stil come in for her 1:30 pm appt due to running late and pt would like a call back today asap. Pt states that she is on her way there now.    Pt can be reached at [phone]

## 2023-02-17 ENCOUNTER — TELEPHONE (OUTPATIENT)
Dept: ENDOSCOPY | Facility: HOSPITAL | Age: 63
End: 2023-02-17
Payer: MEDICARE

## 2023-02-17 NOTE — TELEPHONE ENCOUNTER
----- Message from Linda Gonsalez sent at 2/17/2023 10:22 AM CST -----  Regarding: SAME DAY APPT REQUEST;  Contact: Patient  Type: Patient Call Back         Who called: Patient         What is the request in detail: calling to sched an appt for nausea; stomach issues; lost of appetite            Best call back number: [phone]         Additional Information: EP; 1) [employer] [facility]           Thank You

## 2023-02-17 NOTE — TELEPHONE ENCOUNTER
----- Message from Linda Gonsalez sent at 2/17/2023 10:22 AM CST -----  Regarding: SAME DAY APPT REQUEST;  Contact: Patient  Type: Patient Call Back         Who called: Patient         What is the request in detail: calling to sched an appt for nausea; stomach issues; lost of appetite            Best call back number: [phone]         Additional Information: EP; 1) [employer] [facility]           Thank You

## 2023-02-17 NOTE — TELEPHONE ENCOUNTER
She is having nausea and loss of appetite. She says this has increased over the last few weeks. Reflux is worse. She is on prilosec BID  Please advise

## 2023-03-02 DIAGNOSIS — R11.0 NAUSEA: ICD-10-CM

## 2023-03-02 RX ORDER — DICYCLOMINE HYDROCHLORIDE 10 MG/1
10 CAPSULE ORAL EVERY 12 HOURS PRN
Qty: 60 CAPSULE | Refills: 0 | Status: SHIPPED | OUTPATIENT
Start: 2023-03-02

## 2023-03-02 RX ORDER — ONDANSETRON 4 MG/1
4 TABLET, ORALLY DISINTEGRATING ORAL EVERY 12 HOURS PRN
Qty: 60 TABLET | Refills: 1 | Status: SHIPPED | OUTPATIENT
Start: 2023-03-02

## 2023-03-06 DIAGNOSIS — D53.9 NUTRITIONAL ANEMIA: ICD-10-CM

## 2023-03-06 DIAGNOSIS — D64.9 CHRONIC ANEMIA: Primary | ICD-10-CM

## 2023-04-28 ENCOUNTER — LAB VISIT (OUTPATIENT)
Dept: LAB | Facility: OTHER | Age: 63
End: 2023-04-28
Attending: STUDENT IN AN ORGANIZED HEALTH CARE EDUCATION/TRAINING PROGRAM
Payer: MEDICARE

## 2023-04-28 DIAGNOSIS — D64.9 CHRONIC ANEMIA: ICD-10-CM

## 2023-04-28 DIAGNOSIS — D53.9 NUTRITIONAL ANEMIA: ICD-10-CM

## 2023-04-28 LAB
BASOPHILS # BLD AUTO: 0.02 K/UL (ref 0–0.2)
BASOPHILS NFR BLD: 0.6 % (ref 0–1.9)
DIFFERENTIAL METHOD: ABNORMAL
EOSINOPHIL # BLD AUTO: 0.2 K/UL (ref 0–0.5)
EOSINOPHIL NFR BLD: 5.1 % (ref 0–8)
ERYTHROCYTE [DISTWIDTH] IN BLOOD BY AUTOMATED COUNT: 12.1 % (ref 11.5–14.5)
FERRITIN SERPL-MCNC: 471 NG/ML (ref 20–300)
HCT VFR BLD AUTO: 36 % (ref 37–48.5)
HGB BLD-MCNC: 11.5 G/DL (ref 12–16)
IMM GRANULOCYTES # BLD AUTO: 0.01 K/UL (ref 0–0.04)
IMM GRANULOCYTES NFR BLD AUTO: 0.3 % (ref 0–0.5)
IRON SERPL-MCNC: 50 UG/DL (ref 30–160)
LYMPHOCYTES # BLD AUTO: 1.2 K/UL (ref 1–4.8)
LYMPHOCYTES NFR BLD: 36.7 % (ref 18–48)
MCH RBC QN AUTO: 31.2 PG (ref 27–31)
MCHC RBC AUTO-ENTMCNC: 31.9 G/DL (ref 32–36)
MCV RBC AUTO: 98 FL (ref 82–98)
MONOCYTES # BLD AUTO: 0.4 K/UL (ref 0.3–1)
MONOCYTES NFR BLD: 12.2 % (ref 4–15)
NEUTROPHILS # BLD AUTO: 1.5 K/UL (ref 1.8–7.7)
NEUTROPHILS NFR BLD: 45.1 % (ref 38–73)
NRBC BLD-RTO: 0 /100 WBC
PLATELET # BLD AUTO: 213 K/UL (ref 150–450)
PMV BLD AUTO: 10.2 FL (ref 9.2–12.9)
RBC # BLD AUTO: 3.69 M/UL (ref 4–5.4)
RETICS/RBC NFR AUTO: 1 % (ref 0.5–2.5)
SATURATED IRON: 15 % (ref 20–50)
TOTAL IRON BINDING CAPACITY: 324 UG/DL (ref 250–450)
TRANSFERRIN SERPL-MCNC: 219 MG/DL (ref 200–375)
VIT B12 SERPL-MCNC: 1057 PG/ML (ref 210–950)
WBC # BLD AUTO: 3.35 K/UL (ref 3.9–12.7)

## 2023-04-28 PROCEDURE — 83521 IG LIGHT CHAINS FREE EACH: CPT | Performed by: STUDENT IN AN ORGANIZED HEALTH CARE EDUCATION/TRAINING PROGRAM

## 2023-04-28 PROCEDURE — 86334 IMMUNOFIX E-PHORESIS SERUM: CPT | Mod: 26,,, | Performed by: PATHOLOGY

## 2023-04-28 PROCEDURE — 82607 VITAMIN B-12: CPT | Performed by: STUDENT IN AN ORGANIZED HEALTH CARE EDUCATION/TRAINING PROGRAM

## 2023-04-28 PROCEDURE — 86334 IMMUNOFIX E-PHORESIS SERUM: CPT | Performed by: STUDENT IN AN ORGANIZED HEALTH CARE EDUCATION/TRAINING PROGRAM

## 2023-04-28 PROCEDURE — 85025 COMPLETE CBC W/AUTO DIFF WBC: CPT | Performed by: STUDENT IN AN ORGANIZED HEALTH CARE EDUCATION/TRAINING PROGRAM

## 2023-04-28 PROCEDURE — 83921 ORGANIC ACID SINGLE QUANT: CPT | Performed by: STUDENT IN AN ORGANIZED HEALTH CARE EDUCATION/TRAINING PROGRAM

## 2023-04-28 PROCEDURE — 82728 ASSAY OF FERRITIN: CPT | Performed by: STUDENT IN AN ORGANIZED HEALTH CARE EDUCATION/TRAINING PROGRAM

## 2023-04-28 PROCEDURE — 85045 AUTOMATED RETICULOCYTE COUNT: CPT | Performed by: STUDENT IN AN ORGANIZED HEALTH CARE EDUCATION/TRAINING PROGRAM

## 2023-04-28 PROCEDURE — 86334: ICD-10-PCS | Mod: 26,,, | Performed by: PATHOLOGY

## 2023-04-28 PROCEDURE — 84466 ASSAY OF TRANSFERRIN: CPT | Performed by: STUDENT IN AN ORGANIZED HEALTH CARE EDUCATION/TRAINING PROGRAM

## 2023-05-01 LAB
INTERPRETATION SERPL IFE-IMP: NORMAL
KAPPA LC SER QL IA: 1.5 MG/DL (ref 0.33–1.94)
KAPPA LC/LAMBDA SER IA: 1.33 (ref 0.26–1.65)
LAMBDA LC SER QL IA: 1.13 MG/DL (ref 0.57–2.63)

## 2023-05-02 ENCOUNTER — PATIENT MESSAGE (OUTPATIENT)
Dept: INFUSION THERAPY | Facility: OTHER | Age: 63
End: 2023-05-02
Payer: MEDICAID

## 2023-05-02 LAB
METHYLMALONATE SERPL-SCNC: 0.19 UMOL/L
PATHOLOGIST INTERPRETATION IFE: NORMAL

## 2023-05-05 ENCOUNTER — OFFICE VISIT (OUTPATIENT)
Dept: HEMATOLOGY/ONCOLOGY | Facility: CLINIC | Age: 63
End: 2023-05-05
Payer: MEDICARE

## 2023-05-05 VITALS
HEART RATE: 63 BPM | SYSTOLIC BLOOD PRESSURE: 129 MMHG | WEIGHT: 98.13 LBS | DIASTOLIC BLOOD PRESSURE: 60 MMHG | TEMPERATURE: 99 F | HEIGHT: 62 IN | OXYGEN SATURATION: 100 % | BODY MASS INDEX: 18.06 KG/M2 | RESPIRATION RATE: 12 BRPM

## 2023-05-05 DIAGNOSIS — D70.8 OTHER NEUTROPENIA: ICD-10-CM

## 2023-05-05 DIAGNOSIS — D64.9 CHRONIC ANEMIA: Primary | ICD-10-CM

## 2023-05-05 DIAGNOSIS — E43 SEVERE MALNUTRITION: ICD-10-CM

## 2023-05-05 DIAGNOSIS — D50.8 IRON DEFICIENCY ANEMIA SECONDARY TO INADEQUATE DIETARY IRON INTAKE: ICD-10-CM

## 2023-05-05 DIAGNOSIS — M32.9 SLE (SYSTEMIC LUPUS ERYTHEMATOSUS RELATED SYNDROME): ICD-10-CM

## 2023-05-05 DIAGNOSIS — D53.9 NUTRITIONAL ANEMIA: ICD-10-CM

## 2023-05-05 PROCEDURE — 99999 PR PBB SHADOW E&M-EST. PATIENT-LVL IV: ICD-10-PCS | Mod: PBBFAC,,, | Performed by: STUDENT IN AN ORGANIZED HEALTH CARE EDUCATION/TRAINING PROGRAM

## 2023-05-05 PROCEDURE — 3074F PR MOST RECENT SYSTOLIC BLOOD PRESSURE < 130 MM HG: ICD-10-PCS | Mod: CPTII,S$GLB,, | Performed by: STUDENT IN AN ORGANIZED HEALTH CARE EDUCATION/TRAINING PROGRAM

## 2023-05-05 PROCEDURE — 99999 PR PBB SHADOW E&M-EST. PATIENT-LVL IV: CPT | Mod: PBBFAC,,, | Performed by: STUDENT IN AN ORGANIZED HEALTH CARE EDUCATION/TRAINING PROGRAM

## 2023-05-05 PROCEDURE — 3008F BODY MASS INDEX DOCD: CPT | Mod: CPTII,S$GLB,, | Performed by: STUDENT IN AN ORGANIZED HEALTH CARE EDUCATION/TRAINING PROGRAM

## 2023-05-05 PROCEDURE — 1159F PR MEDICATION LIST DOCUMENTED IN MEDICAL RECORD: ICD-10-PCS | Mod: CPTII,S$GLB,, | Performed by: STUDENT IN AN ORGANIZED HEALTH CARE EDUCATION/TRAINING PROGRAM

## 2023-05-05 PROCEDURE — 3008F PR BODY MASS INDEX (BMI) DOCUMENTED: ICD-10-PCS | Mod: CPTII,S$GLB,, | Performed by: STUDENT IN AN ORGANIZED HEALTH CARE EDUCATION/TRAINING PROGRAM

## 2023-05-05 PROCEDURE — 3078F DIAST BP <80 MM HG: CPT | Mod: CPTII,S$GLB,, | Performed by: STUDENT IN AN ORGANIZED HEALTH CARE EDUCATION/TRAINING PROGRAM

## 2023-05-05 PROCEDURE — 1159F MED LIST DOCD IN RCRD: CPT | Mod: CPTII,S$GLB,, | Performed by: STUDENT IN AN ORGANIZED HEALTH CARE EDUCATION/TRAINING PROGRAM

## 2023-05-05 PROCEDURE — 3078F PR MOST RECENT DIASTOLIC BLOOD PRESSURE < 80 MM HG: ICD-10-PCS | Mod: CPTII,S$GLB,, | Performed by: STUDENT IN AN ORGANIZED HEALTH CARE EDUCATION/TRAINING PROGRAM

## 2023-05-05 PROCEDURE — 99214 OFFICE O/P EST MOD 30 MIN: CPT | Mod: S$GLB,,, | Performed by: STUDENT IN AN ORGANIZED HEALTH CARE EDUCATION/TRAINING PROGRAM

## 2023-05-05 PROCEDURE — 1160F PR REVIEW ALL MEDS BY PRESCRIBER/CLIN PHARMACIST DOCUMENTED: ICD-10-PCS | Mod: CPTII,S$GLB,, | Performed by: STUDENT IN AN ORGANIZED HEALTH CARE EDUCATION/TRAINING PROGRAM

## 2023-05-05 PROCEDURE — 99214 PR OFFICE/OUTPT VISIT, EST, LEVL IV, 30-39 MIN: ICD-10-PCS | Mod: S$GLB,,, | Performed by: STUDENT IN AN ORGANIZED HEALTH CARE EDUCATION/TRAINING PROGRAM

## 2023-05-05 PROCEDURE — 1160F RVW MEDS BY RX/DR IN RCRD: CPT | Mod: CPTII,S$GLB,, | Performed by: STUDENT IN AN ORGANIZED HEALTH CARE EDUCATION/TRAINING PROGRAM

## 2023-05-05 PROCEDURE — 3074F SYST BP LT 130 MM HG: CPT | Mod: CPTII,S$GLB,, | Performed by: STUDENT IN AN ORGANIZED HEALTH CARE EDUCATION/TRAINING PROGRAM

## 2023-05-05 NOTE — PROGRESS NOTES
Hematology- Oncology Clinic Note :      5/5/2023    RFV / chief complaint- Anemia          HPI  Pt is a 62 y.o. female who  has a past medical history of Anxiety, Arthritis, GERD (gastroesophageal reflux disease), and Neuromuscular disorder.   Pt presents to the clinic today for anemia.     In usual state of health  No new complains today. Weight stable.   No recurrent infections.     MMG in [place] - unremarkable. Per pt   Lumpectomy 10 yrs back, path was benign per pt       No smoking   Fhx of cancer- mother lymphoma   Father's mother- cancer  Paternal aunt - breast cancer    Social - lives with mother and     Reviewed past medical/surgical/social history    Past Medical History:   Diagnosis Date    Anxiety     Arthritis     GERD (gastroesophageal reflux disease)     Neuromuscular disorder       Past Surgical History:   Procedure Laterality Date    COLONOSCOPY N/A 8/12/2020    Procedure: COLONOSCOPY;  Surgeon: Cornell Cobb MD;  Location: [facility] ([address] [place] [address]);  Service: Endoscopy;  Laterality: N/A;  Please order CBC day of case    ESOPHAGOGASTRODUODENOSCOPY N/A 8/12/2020    Procedure: EGD (ESOPHAGOGASTRODUODENOSCOPY);  Surgeon: Cornell Cobb MD;  Location: [facility] ([address] [place] [address]);  Service: Endoscopy;  Laterality: N/A;  covid test 8/9-[place] [facility]  labs prior    left breast surgery in 1989 Left       (Not in a hospital admission)      Review of patient's allergies indicates:  No Known Allergies   Social History     Tobacco Use    Smoking status: Never    Smokeless tobacco: Never   Substance Use Topics    Alcohol use: No      Family History   Problem Relation Age of Onset    Cancer Mother     Heart disease Father     Celiac disease Neg Hx     Cirrhosis Neg Hx     Colon cancer Neg Hx     Colon polyps Neg Hx     Crohn's disease Neg Hx     Esophageal cancer Neg Hx     Inflammatory bowel disease Neg Hx     Liver cancer Neg Hx     Liver disease Neg Hx     Rectal cancer Neg Hx     Stomach cancer  "Neg Hx     Ulcerative colitis Neg Hx     Juan's disease Neg Hx     Lymphoma Neg Hx           Review of Systems :  Review of Systems   Constitutional:  Positive for malaise/fatigue. Negative for chills, diaphoresis, fever and weight loss.   HENT: Negative.  Negative for congestion, hearing loss, nosebleeds, sore throat and tinnitus.    Eyes: Negative.  Negative for blurred vision and discharge.   Respiratory:  Negative for cough, hemoptysis, sputum production, shortness of breath and wheezing.    Cardiovascular: Negative.  Negative for chest pain, palpitations and leg swelling.   Gastrointestinal:  Negative for abdominal pain, blood in stool, constipation, diarrhea, heartburn, melena, nausea and vomiting.   Genitourinary: Negative.    Musculoskeletal: Negative.  Negative for back pain, falls, joint pain and myalgias.   Skin: Negative.  Negative for itching and rash.   Neurological: Negative.  Negative for dizziness, tingling, sensory change, speech change, focal weakness, seizures, loss of consciousness, weakness and headaches.   Endo/Heme/Allergies: Negative.  Does not bruise/bleed easily.   Psychiatric/Behavioral: Negative.  Negative for depression. The patient is not nervous/anxious and does not have insomnia.              Physical Exam :  /60 (BP Location: Right arm, Patient Position: Sitting, BP Method: Small (Automatic))   Pulse 63   Temp 98.6 °F (37 °C) (Oral)   Resp 12   Ht 5' 2" (1.575 m)   Wt 44.5 kg (98 lb 1.7 oz)   LMP 10/24/2013   SpO2 100%   BMI 17.94 kg/m²   Wt Readings from Last 3 Encounters:   05/05/23 44.5 kg (98 lb 1.7 oz)   09/08/22 45.4 kg (100 lb 1.4 oz)   02/07/22 44.1 kg (97 lb 3.6 oz)       Body mass index is 17.94 kg/m².      Physical Exam  Vitals and nursing note reviewed.   Constitutional:       General: She is not in acute distress.     Appearance: Normal appearance. She is well-developed. She is not ill-appearing.   HENT:      Head: Normocephalic and atraumatic.      " Right Ear: External ear normal.      Left Ear: External ear normal.      Mouth/Throat:      Pharynx: No oropharyngeal exudate.   Eyes:      General: No scleral icterus.        Right eye: No discharge.         Left eye: No discharge.      Conjunctiva/sclera: Conjunctivae normal.   Neck:      Thyroid: No thyromegaly.      Trachea: No tracheal deviation.   Cardiovascular:      Rate and Rhythm: Normal rate and regular rhythm.      Heart sounds: Normal heart sounds. No murmur heard.  Pulmonary:      Effort: Pulmonary effort is normal. No respiratory distress.      Breath sounds: Normal breath sounds. No wheezing or rales.   Abdominal:      General: Bowel sounds are normal. There is no distension.      Palpations: Abdomen is soft. There is no mass.      Tenderness: There is no abdominal tenderness. There is no rebound.   Musculoskeletal:         General: No swelling, tenderness or deformity. Normal range of motion.      Cervical back: Normal range of motion and neck supple.      Right lower leg: No edema.      Left lower leg: No edema.   Lymphadenopathy:      Cervical: No cervical adenopathy.   Skin:     General: Skin is warm.      Coloration: Skin is not jaundiced.      Findings: No bruising, erythema, lesion or rash.   Neurological:      General: No focal deficit present.      Mental Status: She is alert and oriented to person, place, and time. Mental status is at baseline.      Cranial Nerves: No cranial nerve deficit.      Coordination: Coordination normal.      Gait: Gait normal.   Psychiatric:         Mood and Affect: Mood normal.         Behavior: Behavior normal.           Current Outpatient Medications   Medication Sig Dispense Refill    cyanocobalamin 1,000 mcg/mL injection INJECT 1 ML INTO THE SKIN ONCE A WEEK      dicyclomine (BENTYL) 10 MG capsule Take 1 capsule (10 mg total) by mouth every 12 (twelve) hours as needed (Abdominal cramps). 60 capsule 0    famotidine (PEPCID) 20 MG tablet TK 1 T PO BID       hydroxychloroquine (PLAQUENIL) 200 mg tablet Take 200 mg by mouth once daily.      LORazepam (ATIVAN) 1 MG tablet Take 1 mg by mouth 2 (two) times daily.      methocarbamol (ROBAXIN) 500 MG Tab Take 500 mg by mouth 3 (three) times daily as needed.      ondansetron (ZOFRAN-ODT) 4 MG TbDL Take 1 tablet (4 mg total) by mouth every 12 (twelve) hours as needed (Nausea). 60 tablet 1    oxycodone-acetaminophen (PERCOCET) 5-325 mg per tablet Take 1 tablet by mouth every 4 (four) hours as needed for Pain.      pantoprazole (PROTONIX) 20 MG tablet Take 20 mg by mouth 2 (two) times daily before meals.      sotalol (BETAPACE) 80 MG tablet Take 80 mg by mouth 2 (two) times daily.      zolpidem (AMBIEN) 10 mg Tab Take 10 mg by mouth nightly as needed.      lidocaine HCl 2% (XYLOCAINE) 2 % Soln GARGLE AND SPIT 1 TEASPOON EVERY 6 HOURS AS NEEDED FOR SORE THROAT       No current facility-administered medications for this visit.       Pertinent Diagnostic studies:      No visits with results within 1 Week(s) from this visit.   Latest known visit with results is:   Lab Visit on 04/28/2023   Component Date Value Ref Range Status    WBC 04/28/2023 3.35 (L)  3.90 - 12.70 K/uL Final    RBC 04/28/2023 3.69 (L)  4.00 - 5.40 M/uL Final    Hemoglobin 04/28/2023 11.5 (L)  12.0 - 16.0 g/dL Final    Hematocrit 04/28/2023 36.0 (L)  37.0 - 48.5 % Final    MCV 04/28/2023 98  82 - 98 fL Final    MCH 04/28/2023 31.2 (H)  27.0 - 31.0 pg Final    MCHC 04/28/2023 31.9 (L)  32.0 - 36.0 g/dL Final    RDW 04/28/2023 12.1  11.5 - 14.5 % Final    Platelets 04/28/2023 213  150 - 450 K/uL Final    MPV 04/28/2023 10.2  9.2 - 12.9 fL Final    Immature Granulocytes 04/28/2023 0.3  0.0 - 0.5 % Final    Gran # (ANC) 04/28/2023 1.5 (L)  1.8 - 7.7 K/uL Final    Immature Grans (Abs) 04/28/2023 0.01  0.00 - 0.04 K/uL Final    Comment: Mild elevation in immature granulocytes is non specific and   can be seen in a variety of conditions including stress response,   acute  inflammation, trauma and pregnancy. Correlation with other   laboratory and clinical findings is essential.      Lymph # 04/28/2023 1.2  1.0 - 4.8 K/uL Final    Mono # 04/28/2023 0.4  0.3 - 1.0 K/uL Final    Eos # 04/28/2023 0.2  0.0 - 0.5 K/uL Final    Baso # 04/28/2023 0.02  0.00 - 0.20 K/uL Final    nRBC 04/28/2023 0  0 /100 WBC Final    Gran % 04/28/2023 45.1  38.0 - 73.0 % Final    Lymph % 04/28/2023 36.7  18.0 - 48.0 % Final    Mono % 04/28/2023 12.2  4.0 - 15.0 % Final    Eosinophil % 04/28/2023 5.1  0.0 - 8.0 % Final    Basophil % 04/28/2023 0.6  0.0 - 1.9 % Final    Differential Method 04/28/2023 Automated   Final    Ferritin 04/28/2023 471 (H)  20.0 - 300.0 ng/mL Final    Iron 04/28/2023 50  30 - 160 ug/dL Final    Transferrin 04/28/2023 219  200 - 375 mg/dL Final    TIBC 04/28/2023 324  250 - 450 ug/dL Final    Saturated Iron 04/28/2023 15 (L)  20 - 50 % Final    Kappa Free Light Chains 04/28/2023 1.50  0.33 - 1.94 mg/dL Final    Lambda Free Light Chains 04/28/2023 1.13  0.57 - 2.63 mg/dL Final    Kappa/Lambda FLC Ratio 04/28/2023 1.33  0.26 - 1.65 Final    Comment: Undetected antigen excess is a rare event but cannot   be excluded. If these free light chain results do not   agree with other clinical or laboratory findings or   if the sample is from a patient that has previously   demonstrated antigen excess, discuss with the testing   laboratory.   Results should always be interpreted in conjunction   with other laboratory tests and clinical evidence.      Vitamin B-12 04/28/2023 1057 (H)  210 - 950 pg/mL Final    Methlymalonic Acid 04/28/2023 0.19  <0.40 umol/L Final    Comment: If applicable, any drug confirmation testing reported  here was developed and the performance characteristics  determined by [facility]. This   confirmation testing has not been cleared or approved  by the FDA. The laboratory is regulated under CLIA as  qualified to perform high-complexity testing. This test  is  used for patient testing purposes. It should not be  regarded as investigational or for research.    Test performed at [place],  [address], [place]  [address]     [phone]  Angelina Perez MD, PhD - Medical Director      Immunofix Interp. 04/28/2023 SEE COMMENT   Final    Comment: Serum protein electrophoresis and immunofixation results should be   interpreted in clinical context in that some therapeutic agents can   result   in false positive results (example, daratumumab). Correlation with   the   patient s therapeutic regimen is required.  See pathologist's interpretation.      Retic 04/28/2023 1.0  0.5 - 2.5 % Final    Pathologist Interpretation UMAIR 04/28/2023 REVIEWED   Final    Comment:   Electronically reviewed and signed by:  Jazmin Diaz M.D.  Signed on 05/02/23 at 10:27  No monoclonal peaks identified.                   Oncology History    No history exists.     Assessment :       1. Chronic anemia    2. Other neutropenia    3. Nutritional anemia    4. Iron deficiency anemia secondary to inadequate dietary iron intake    5. Severe malnutrition    6. SLE (systemic lupus erythematosus related syndrome)        Plan :     Anemia- likely chronic ds. Iron def improved.   8/12/2020- EGD/ C scope- unremarkable, Dr Cobb)  injectafer x 2 given in July 2021 with improvement in hb and iron stores  Labs from this visit with mild anemia which is likely due to chronic ds   Labs from April 28, 2023 hemoglobin 11.5.  Leukopenia stable ANC 1500.  Stable  Iron profile T sat low at 15, ferritin elevated at 471.  B12 high at 1057. Spep and light chains- wnl.   RTC 6 months with repeat labs .     Neutropenia -moderate, ANC > 1000. Pt asymp. Could be ethnical/ constitutional vs related to lupus or lupus meds. monitor    Severe malnutrition - stable   Body mass index is 17.94 kg/m².  -CT scans reviewed- no evidence of malignancy.   -continue high calorie high protein diet   -f/u with pcp/  gi       Sle- per rheuma , on plaquenil                Problem List Items Addressed This Visit       Iron deficiency anemia secondary to inadequate dietary iron intake    Severe malnutrition    SLE (systemic lupus erythematosus related syndrome)     Other Visit Diagnoses       Chronic anemia    -  Primary    Other neutropenia        Nutritional anemia                    Electronically signed by Jaclyn Rapp    [facility]      No future appointments.            This note was created with voice recognition software.  Grammatical, syntax and spelling errors may be inevitable.

## 2023-07-27 ENCOUNTER — TELEPHONE (OUTPATIENT)
Dept: ENDOSCOPY | Facility: HOSPITAL | Age: 63
End: 2023-07-27
Payer: MEDICAID

## 2023-07-27 NOTE — TELEPHONE ENCOUNTER
----- Message from Sophy Beltran sent at 7/27/2023 11:09 AM CDT -----  Regarding: appt access  Contact: [phone]  Luciana Thurston calling regarding Appointment Access  (message) for # stomach pains and nausea. She states she cannot eat too much she feels sick. Please call

## 2024-02-01 ENCOUNTER — TELEPHONE (OUTPATIENT)
Dept: GASTROENTEROLOGY | Facility: CLINIC | Age: 64
End: 2024-02-01
Payer: MEDICARE

## 2024-02-01 NOTE — TELEPHONE ENCOUNTER
Spoke with patient. Offered earlier appointment with Dr Glaicia in March. Patient wanted to wait until Dr Cobb is available. Scheduled appointment.

## 2024-02-01 NOTE — TELEPHONE ENCOUNTER
----- Message from Cyndy Mckeon sent at 2/1/2024  1:26 PM CST -----  Luciana Thurston calling regarding Appointment Access for wanting to be seen for having nausea, vomiting, diarrhea, rapid weight loss, trouble swallowing and heartburn, please call back to inform as to a soonest available appt. for the issues, [phone]

## 2024-05-08 ENCOUNTER — PATIENT MESSAGE (OUTPATIENT)
Dept: GASTROENTEROLOGY | Facility: CLINIC | Age: 64
End: 2024-05-08
Payer: MEDICARE

## 2024-05-13 ENCOUNTER — OFFICE VISIT (OUTPATIENT)
Dept: GASTROENTEROLOGY | Facility: CLINIC | Age: 64
End: 2024-05-13
Payer: MEDICARE

## 2024-05-13 VITALS
DIASTOLIC BLOOD PRESSURE: 62 MMHG | BODY MASS INDEX: 18.06 KG/M2 | HEART RATE: 62 BPM | WEIGHT: 98.13 LBS | SYSTOLIC BLOOD PRESSURE: 126 MMHG | HEIGHT: 62 IN

## 2024-05-13 DIAGNOSIS — E73.9 ADULT LACTASE DEFICIENCY: Primary | ICD-10-CM

## 2024-05-13 DIAGNOSIS — R68.81 EARLY SATIETY: ICD-10-CM

## 2024-05-13 DIAGNOSIS — R63.39 FOOD AVERSION: ICD-10-CM

## 2024-05-13 DIAGNOSIS — E43 SEVERE MALNUTRITION: ICD-10-CM

## 2024-05-13 DIAGNOSIS — M32.9 SLE (SYSTEMIC LUPUS ERYTHEMATOSUS RELATED SYNDROME): ICD-10-CM

## 2024-05-13 DIAGNOSIS — R10.13 EPIGASTRIC ABDOMINAL PAIN: ICD-10-CM

## 2024-05-13 PROCEDURE — 99999 PR PBB SHADOW E&M-EST. PATIENT-LVL IV: CPT | Mod: PBBFAC,,, | Performed by: INTERNAL MEDICINE

## 2024-05-13 PROCEDURE — 99214 OFFICE O/P EST MOD 30 MIN: CPT | Mod: S$GLB,,, | Performed by: INTERNAL MEDICINE

## 2024-05-13 PROCEDURE — 3008F BODY MASS INDEX DOCD: CPT | Mod: CPTII,S$GLB,, | Performed by: INTERNAL MEDICINE

## 2024-05-13 PROCEDURE — 3074F SYST BP LT 130 MM HG: CPT | Mod: CPTII,S$GLB,, | Performed by: INTERNAL MEDICINE

## 2024-05-13 PROCEDURE — 3078F DIAST BP <80 MM HG: CPT | Mod: CPTII,S$GLB,, | Performed by: INTERNAL MEDICINE

## 2024-05-13 PROCEDURE — 1159F MED LIST DOCD IN RCRD: CPT | Mod: CPTII,S$GLB,, | Performed by: INTERNAL MEDICINE

## 2024-05-13 PROCEDURE — 1160F RVW MEDS BY RX/DR IN RCRD: CPT | Mod: CPTII,S$GLB,, | Performed by: INTERNAL MEDICINE

## 2024-05-13 RX ORDER — NEOMYCIN SULFATE, POLYMYXIN B SULFATE AND DEXAMETHASONE 3.5; 10000; 1 MG/ML; [USP'U]/ML; MG/ML
1 SUSPENSION/ DROPS OPHTHALMIC
COMMUNITY

## 2024-05-13 RX ORDER — FLUTICASONE PROPIONATE 50 MCG
1 SPRAY, SUSPENSION (ML) NASAL
COMMUNITY

## 2024-05-13 NOTE — Clinical Note
Important:  Mary please refer Luciana to our dietitian for help with weight gain unable to place referral due to insurance reasons Patient with BMI 17.94 please help with weight gain Also with lactase deficiency Please educate on lactose-free diet an adequate calcium and vitamin-D

## 2024-05-13 NOTE — PROGRESS NOTES
"GENERAL GI PATIENT INTAKE:    COVID symptoms in the last 7 days (runny nose, sore throat, congestion, cough, fever): No  PCP: Sebastien Yousif Jr.  If not PCP-  number given to establish [phone]: N/A    ALLERGIES REVIEWED:  Yes    CHIEF COMPLAINT:    Chief Complaint   Patient presents with    Gastroesophageal Reflux       VITAL SIGNS:  /62   Pulse 62   Ht 5' 2" (1.575 m)   Wt 44.5 kg (98 lb 1.7 oz)   LMP 10/24/2013   BMI 17.94 kg/m²      Change in medical, surgical, family or social history: No      REVIEWED MEDICATION LIST RECONCILED INCLUDING ABOVE MEDS:  Yes     "

## 2024-05-13 NOTE — PATIENT INSTRUCTIONS
"Procedure: EGD    Diagnosis: Abdominal pain, early satiety inability gained weight    Procedure Timin-6 weeks    *If within 4 weeks selected, please vilma as high priority*    *If greater than 12 weeks, please select "5-12 weeks" and delay sending until 3 months prior to requested date*    Location: [address]    Additional Scheduling Information: No scheduling concerns    Prep Specifications:Standard prep    Is the patient taking a GLP-1 Agonist:no    Have you attached a patient to this message: yes   "

## 2024-05-13 NOTE — PROGRESS NOTES
[facility] Consultation Note    Reason for Consult:  The primary encounter diagnosis was Adult lactase deficiency. Diagnoses of Severe malnutrition, SLE (systemic lupus erythematosus related syndrome), Food aversion, Epigastric abdominal pain, and Early satiety were also pertinent to this visit.    PCP:   Sebastien Yousif Jr.       Referring MD:  No referring provider defined for this encounter.    Initial History of Present Illness (HPI):  This is a 63 y.o. female here for evaluation of postprandial epigastric pain inability to gain weight patient has a history of SLE diagnosed about 7 years ago she says she is managed by a rheumatologist at [facility] but she would like to get managed here at [facility] she is asking for referral to our rheumatologist here referral has been placed she has a primary care doctor there she says that she gets postprandial abdominal pain some early satiety evaluation in the past has been unrevealing she is followed by Heme-Onc gets IV iron we gave her information again today on lactase deficiency Lactaid pills avoidance of lactose containing foods adequate calcium and vitamin-D recommend she follow-up with our dietitian for help with weight gain lactase deficiency diet will refer her for CT enterography for further evaluation her abdominal pain 12 hour fasting lab work in an EGD for further evaluation colonoscopy is up-to-date and normal.  No dysphagia no odynophagia no chest pain no shortness of breath no flushing no wheezing no palpitations no blood in her stool no dysuria urgency or frequency.    Abdominal pain - as above  Reflux - no  Dysphagia - no   Bowel habits - normal  GI bleeding - none  NSAID usage - none    Interval HPI 05/13/2024:  The patient's last visit with me was on 6/18/2021.      ROS:  Constitutional: No fevers, chills, No weight loss, thin appearing healthy-appearing  ENT:  No heartburn no dysphagia no odynophagia no hoarseness  CV: No chest pain,  no palpitation  Pulm: No cough, No shortness of breath, no wheezing  Ophtho: No vision changes  GI: see HPI  Derm: No rash, no itching  Heme: No lymphadenopathy, No easy bruising  MSK: No significant arthritis  : No dysuria, No hematuria  Endo: No hot or cold intolerance  Neuro: No syncope, No seizure, no strokes  Psych: No uncontrolled anxiety, No uncontrolled depression    Medical History:  has a past medical history of Anxiety, Arthritis, GERD (gastroesophageal reflux disease), and Neuromuscular disorder.    Surgical History:  has a past surgical history that includes left breast surgery in 1989 (Left); Esophagogastroduodenoscopy (N/A, 8/12/2020); and Colonoscopy (N/A, 8/12/2020).    Family History: family history includes Cancer in her mother; Heart disease in her father..     Social History:  reports that she has never smoked. She has never used smokeless tobacco. She reports that she does not drink alcohol and does not use drugs.    Review of patient's allergies indicates:  No Known Allergies    Medication List with Changes/Refills   Current Medications    CYANOCOBALAMIN 1,000 MCG/ML INJECTION    INJECT 1 ML INTO THE SKIN ONCE A WEEK    DICYCLOMINE (BENTYL) 10 MG CAPSULE    Take 1 capsule (10 mg total) by mouth every 12 (twelve) hours as needed (Abdominal cramps).    FLUTICASONE PROPIONATE (FLONASE) 50 MCG/ACTUATION NASAL SPRAY    1 spray by Each Nostril route as needed.    HYDROXYCHLOROQUINE (PLAQUENIL) 200 MG TABLET    Take 200 mg by mouth once daily.    LIDOCAINE HCL 2% (XYLOCAINE) 2 % SOLN    GARGLE AND SPIT 1 TEASPOON EVERY 6 HOURS AS NEEDED FOR SORE THROAT    LORAZEPAM (ATIVAN) 1 MG TABLET    Take 1 mg by mouth 2 (two) times daily.    METHOCARBAMOL (ROBAXIN) 500 MG TAB    Take 500 mg by mouth 3 (three) times daily as needed.    NEOMYCIN-POLYMYXIN-DEXAMETHASONE (MAXITROL) 3.5MG/ML-10,000 UNIT/ML-0.1 % DRPS    Place 1 drop into both eyes.    ONDANSETRON (ZOFRAN-ODT) 4 MG TBDL    Take 1 tablet (4 mg  "total) by mouth every 12 (twelve) hours as needed (Nausea).    OXYCODONE-ACETAMINOPHEN (PERCOCET) 5-325 MG PER TABLET    Take 1 tablet by mouth every 4 (four) hours as needed for Pain.    PANTOPRAZOLE (PROTONIX) 20 MG TABLET    Take 20 mg by mouth 2 (two) times daily before meals.    SOTALOL (BETAPACE) 80 MG TABLET    Take 80 mg by mouth 2 (two) times daily.    ZOLPIDEM (AMBIEN) 10 MG TAB    Take 10 mg by mouth nightly as needed.   Discontinued Medications    FAMOTIDINE (PEPCID) 20 MG TABLET    TK 1 T PO BID         Objective Findings:    Vital Signs:  /62   Pulse 62   Ht 5' 2" (1.575 m)   Wt 44.5 kg (98 lb 1.7 oz)   LMP 10/24/2013   BMI 17.94 kg/m²   Body mass index is 17.94 kg/m².    Physical Exam:  General Appearance: Well appearing in no acute distress  Eyes:    No scleral icterus  ENT:  No lesions or masses   Lungs: CTA bilaterally, no wheezes, no rhonchi, no rales  Heart:  S1, S2 normal, no murmurs heard  Abdomen:  Thin, Non distended, soft, no guarding, no rebound, no tenderness, no appreciated ascites, no bruits, no hepatosplenomegaly,  No CVA tenderness, no appreciated hernias common no Brenner sign no McBurney point tenderness  Musculoskeletal:  No major joint deformities  Skin: No petechiae or rash on exposed skin areas  Neurologic:  Alert and oriented x4  Psychiatric:  Normal speech mentation and affect    Labs:  Lab Results   Component Value Date    WBC 3.35 (L) 04/28/2023    HGB 11.5 (L) 04/28/2023    HCT 36.0 (L) 04/28/2023     04/28/2023    ALT 14 02/02/2022    AST 23 02/02/2022     02/02/2022    K 3.7 02/02/2022     02/02/2022    CREATININE 0.7 02/02/2022    BUN 10 02/02/2022    CO2 31 (H) 02/02/2022    TSH 0.502 08/02/2021    HGBA1C 5.3 07/24/2020           Medical Decision Making:    The Olympus scope GIF- ([id number]) was introduced                         through the mouth, and advanced to the third part                         of duodenum.   Findings:       The " examined duodenum was normal. Biopsies for histology were taken        with a cold forceps for evaluation of celiac disease. Biopsies were        taken with a cold forceps for histology. Estimated blood loss was        minimal.        The entire examined stomach was normal. Biopsies were taken with a        cold forceps for histology. Estimated blood loss was minimal.        The cardia and gastric fundus were normal on retroflexion.        The examined esophagus was normal. Z-line was sharp. No esophagitis        and no columnar esophagus and no lesions seen with NBI or white        light.        The Z-line was regular and was found 39 cm from the incisors.   Impression:           - Normal examined duodenum. Biopsied.                         - Normal stomach. Biopsied.                         - Normal esophagus.                         - Z-line regular, 39 cm from the incisors.   Recommendation:       - Discharge patient to home.                         - Await pathology results.                         - Telephone endoscopist for pathology results in 2                         weeks.                         - Return to GI clinic.                         - The findings and recommendations were discussed                         with the patient.                         - Return to primary care physician.   Attending Participation:        I personally performed the entire procedure.   Cornell Cobb MD   8/12/2020    The Olympus scope PCF-H190DL ([id number]) was                         introduced through the anus and advanced to 20 cm                         into the ileum. The colonoscopy was performed                         without difficulty. The patient tolerated the                         procedure well. The quality of the bowel                         preparation was excellent. Scope insertion time was                         3 minutes. Scope withdrawal time was 12 minutes.                         Good look.  The terminal ileum, ileocecal valve,                         appendiceal orifice, and rectum were photographed.   Findings:       The terminal ileum appeared normal. Biopsies were taken with a cold        forceps for histology. Estimated blood loss was minimal.        Non-bleeding internal hemorrhoids were found during retroflexion.        The hemorrhoids were mild.        Diverticula were found in the recto-sigmoid colon, sigmoid colon,        descending colon and transverse colon.        The colon (entire examined portion) appeared normal. Biopsies for        histology were taken with a cold forceps from the entire colon for        evaluation of microscopic colitis. Estimated blood loss was minimal.        The perianal and digital rectal examinations were normal.   Impression:           - The examined portion of the ileum was normal.                         Biopsied.                         - Non-bleeding internal hemorrhoids.                         - Diverticulosis in the recto-sigmoid colon, in the                         sigmoid colon, in the descending colon and in the                         transverse colon.                         - The entire examined colon is normal. Biopsied.   Recommendation:       - Discharge patient to home.                         - Await pathology results.                         - Telephone endoscopist for pathology results in 2                         weeks.                         - Return to primary care physician.                         - Return to GI clinic.                         - Repeat colonoscopy in 10 years for screening                         purposes.                         - THIS RECOMMENDATION of 10-Years FOR NEXT                         SCREENING COLONOSCOPY ASSUMES THAT YOU WILL NOT                         HAVE HAD OR NOT HAD A FIRST OR SECOND DEGREE                         RELATIVE/S WITH COLORECTAL CANCER OR AN ADVANCE                         COLON ADENOMAS  BEFORE THE AGE OF 60 YEARS OF AGE OF                         THOSE INDIVIDUALS BY THE TIME OF YOUR NEXT                         SCREENING COLONOSCOPY.                         - The findings and recommendations were discussed                         with the patient.   Attending Participation:        I personally performed the entire procedure.   Cornell Cobb MD   8/12/2020     1.  Duodenum (biopsy):  - Benign small intestinal mucosa, no histologic abnormality  - No villous blunting or intraepithelial lymphocytosis  2.  Stomach (biopsy):  - Benign oxyntic and antral-type mucosa, no histologic abnormality  - No H.pylori organism identified on H&E stained sections  - No intestinal metaplasia, dysplasia or malignancy  3.  Terminal ileum (biopsy):  - Benign small intestinal mucosa, no histologic abnormality  - No villous blunting or intraepithelial lymphocytosis  - No chronic or active enteritis  4.  Colon (biopsy):  - Benign colonic mucosa, no histologic abnormality  - No chronic or active colitis; no microscopic colitis    Comment: Interpreted by: Rafal Perez M.D., Signed on 08/18/2020       DISACCHARIDASE PANEL:  INTERPRETATION:  The intestinal biopsy from this patient had lactase deficiency.  Report attached.  Performing site:  [facility]  Gastroenterology Lab [facility] [address]  [address] [place] [address]    Comment: Interp By Garret Palumbo M.D., Signed on 08/24/2020     Assessment:  1. Adult lactase deficiency    2. Severe malnutrition    3. SLE (systemic lupus erythematosus related syndrome)    4. Food aversion    5. Epigastric abdominal pain    6. Early satiety         Recommendations:  1. 12 hour fasting blood  2. CT enterography close attention to the pancreas and small-bowel  3. Referral to endoscopy schedulers for EGD for evaluation of epigastric pain early satiety and inability to gain weight  4. Patient would like referral to [facility] for evaluation of her diagnosis of SLE  5. Referral to [facility] dietitian for help with weight gain in somebody underweight malnourished and lactase deficiency for education on lactose-free diet adequate calcium and vitamin-D and help with weight gain  6. Return GI clinic 8-12 weeks for follow-up     Follow up in about 3 months (around 8/13/2024).      Order summary:  Orders Placed This Encounter    CT Enterography Abd_Pelvis With Contrast    Creatinine, serum    Comprehensive Metabolic Panel    CBC Auto Differential    Celiac Disease Panel    Lipase    TSH    Iron and TIBC    Ferritin    Vitamin D    Vitamin B12    Ambulatory referral/consult to Rheumatology         Thank you so much for allowing me to participate in the care of Luciana Karena Cobb MD    DISCLAIMER: This note was prepared with Osurv voice recognition transcription software. Garbled syntax, mangled or inadvertent pronouns, and other bizarre constructions may be attributed to that software system. While efforts were made to correct any mistakes made by this voice recognition program, some errors and/or omissions may remain in the note that were missed when the note was originally created.

## 2024-05-13 NOTE — Clinical Note
"Procedure: EGD  Diagnosis: Abdominal pain, early satiety inability gained weight  Procedure Timin-6 weeks  *If within 4 weeks selected, please vilma as high priority*  *If greater than 12 weeks, please select "5-12 weeks" and delay sending until 3 months prior to requested date*  Location: [address]  Additional Scheduling Information: No scheduling concerns  Prep Specifications:Standard prep  Is the patient taking a GLP-1 Agonist:no  Have you attached a patient to this message: yes "

## 2024-05-13 NOTE — Clinical Note
GI MA team Please schedule patient for 12 hour fasting blood work Please schedule patient for CT enterography Please have patient see endoscopy schedulers for EGD referral placed  Please refer patient to dietitian for lactase deficiency severe protein malnutrition and help with weight gain unable to place referral  Patient would like referral to Rheumatology to see if a rheumatologist here with see your she would like to switch her care to [facility] referral placed  GI clinic appointment 3 months for follow-up

## 2024-05-14 ENCOUNTER — LAB VISIT (OUTPATIENT)
Dept: LAB | Facility: HOSPITAL | Age: 64
End: 2024-05-14
Attending: INTERNAL MEDICINE
Payer: MEDICARE

## 2024-05-14 DIAGNOSIS — E43 SEVERE MALNUTRITION: ICD-10-CM

## 2024-05-14 DIAGNOSIS — R63.39 FOOD AVERSION: ICD-10-CM

## 2024-05-14 DIAGNOSIS — R10.13 EPIGASTRIC ABDOMINAL PAIN: ICD-10-CM

## 2024-05-14 DIAGNOSIS — R68.81 EARLY SATIETY: ICD-10-CM

## 2024-05-14 DIAGNOSIS — M32.9 SLE (SYSTEMIC LUPUS ERYTHEMATOSUS RELATED SYNDROME): ICD-10-CM

## 2024-05-14 DIAGNOSIS — E73.9 ADULT LACTASE DEFICIENCY: ICD-10-CM

## 2024-05-14 LAB
25(OH)D3+25(OH)D2 SERPL-MCNC: 17 NG/ML (ref 30–96)
ALBUMIN SERPL BCP-MCNC: 3.8 G/DL (ref 3.5–5.2)
ALP SERPL-CCNC: 66 U/L (ref 55–135)
ALT SERPL W/O P-5'-P-CCNC: 11 U/L (ref 10–44)
ANION GAP SERPL CALC-SCNC: 11 MMOL/L (ref 8–16)
AST SERPL-CCNC: 20 U/L (ref 10–40)
BASOPHILS # BLD AUTO: 0.01 K/UL (ref 0–0.2)
BASOPHILS NFR BLD: 0.3 % (ref 0–1.9)
BILIRUB SERPL-MCNC: 0.4 MG/DL (ref 0.1–1)
BUN SERPL-MCNC: 9 MG/DL (ref 8–23)
CALCIUM SERPL-MCNC: 9.3 MG/DL (ref 8.7–10.5)
CHLORIDE SERPL-SCNC: 105 MMOL/L (ref 95–110)
CO2 SERPL-SCNC: 24 MMOL/L (ref 23–29)
CREAT SERPL-MCNC: 0.7 MG/DL (ref 0.5–1.4)
CREAT SERPL-MCNC: 0.7 MG/DL (ref 0.5–1.4)
DIFFERENTIAL METHOD BLD: ABNORMAL
EOSINOPHIL # BLD AUTO: 0.1 K/UL (ref 0–0.5)
EOSINOPHIL NFR BLD: 2.1 % (ref 0–8)
ERYTHROCYTE [DISTWIDTH] IN BLOOD BY AUTOMATED COUNT: 12.5 % (ref 11.5–14.5)
EST. GFR  (NO RACE VARIABLE): >60 ML/MIN/1.73 M^2
EST. GFR  (NO RACE VARIABLE): >60 ML/MIN/1.73 M^2
FERRITIN SERPL-MCNC: 235 NG/ML (ref 20–300)
GLUCOSE SERPL-MCNC: 85 MG/DL (ref 70–110)
HCT VFR BLD AUTO: 35.3 % (ref 37–48.5)
HGB BLD-MCNC: 11.5 G/DL (ref 12–16)
IMM GRANULOCYTES # BLD AUTO: 0.01 K/UL (ref 0–0.04)
IMM GRANULOCYTES NFR BLD AUTO: 0.3 % (ref 0–0.5)
IRON SERPL-MCNC: 61 UG/DL (ref 30–160)
LIPASE SERPL-CCNC: 8 U/L (ref 4–60)
LYMPHOCYTES # BLD AUTO: 1.2 K/UL (ref 1–4.8)
LYMPHOCYTES NFR BLD: 35.7 % (ref 18–48)
MCH RBC QN AUTO: 30.9 PG (ref 27–31)
MCHC RBC AUTO-ENTMCNC: 32.6 G/DL (ref 32–36)
MCV RBC AUTO: 95 FL (ref 82–98)
MONOCYTES # BLD AUTO: 0.3 K/UL (ref 0.3–1)
MONOCYTES NFR BLD: 7.9 % (ref 4–15)
NEUTROPHILS # BLD AUTO: 1.8 K/UL (ref 1.8–7.7)
NEUTROPHILS NFR BLD: 53.7 % (ref 38–73)
NRBC BLD-RTO: 0 /100 WBC
PLATELET # BLD AUTO: 263 K/UL (ref 150–450)
PMV BLD AUTO: 10.3 FL (ref 9.2–12.9)
POTASSIUM SERPL-SCNC: 3.7 MMOL/L (ref 3.5–5.1)
PROT SERPL-MCNC: 6.8 G/DL (ref 6–8.4)
RBC # BLD AUTO: 3.72 M/UL (ref 4–5.4)
SATURATED IRON: 18 % (ref 20–50)
SODIUM SERPL-SCNC: 140 MMOL/L (ref 136–145)
TOTAL IRON BINDING CAPACITY: 330 UG/DL (ref 250–450)
TRANSFERRIN SERPL-MCNC: 223 MG/DL (ref 200–375)
TSH SERPL DL<=0.005 MIU/L-ACNC: 0.73 UIU/ML (ref 0.4–4)
VIT B12 SERPL-MCNC: 1352 PG/ML (ref 210–950)
WBC # BLD AUTO: 3.28 K/UL (ref 3.9–12.7)

## 2024-05-14 PROCEDURE — 86364 TISS TRNSGLTMNASE EA IG CLAS: CPT | Mod: 59 | Performed by: INTERNAL MEDICINE

## 2024-05-14 PROCEDURE — 82306 VITAMIN D 25 HYDROXY: CPT | Performed by: INTERNAL MEDICINE

## 2024-05-14 PROCEDURE — 80053 COMPREHEN METABOLIC PANEL: CPT | Performed by: INTERNAL MEDICINE

## 2024-05-14 PROCEDURE — 82728 ASSAY OF FERRITIN: CPT | Performed by: INTERNAL MEDICINE

## 2024-05-14 PROCEDURE — 86258 DGP ANTIBODY EACH IG CLASS: CPT | Mod: 59 | Performed by: INTERNAL MEDICINE

## 2024-05-14 PROCEDURE — 83690 ASSAY OF LIPASE: CPT | Performed by: INTERNAL MEDICINE

## 2024-05-14 PROCEDURE — 83540 ASSAY OF IRON: CPT | Performed by: INTERNAL MEDICINE

## 2024-05-14 PROCEDURE — 82607 VITAMIN B-12: CPT | Performed by: INTERNAL MEDICINE

## 2024-05-14 PROCEDURE — 85025 COMPLETE CBC W/AUTO DIFF WBC: CPT | Performed by: INTERNAL MEDICINE

## 2024-05-14 PROCEDURE — 84443 ASSAY THYROID STIM HORMONE: CPT | Performed by: INTERNAL MEDICINE

## 2024-05-14 PROCEDURE — 36415 COLL VENOUS BLD VENIPUNCTURE: CPT | Performed by: INTERNAL MEDICINE

## 2024-05-15 ENCOUNTER — PATIENT MESSAGE (OUTPATIENT)
Dept: GASTROENTEROLOGY | Facility: CLINIC | Age: 64
End: 2024-05-15
Payer: MEDICARE

## 2024-05-15 DIAGNOSIS — E55.9 VITAMIN D INSUFFICIENCY: ICD-10-CM

## 2024-05-15 DIAGNOSIS — R11.0 NAUSEA: ICD-10-CM

## 2024-05-15 DIAGNOSIS — D50.9 IRON DEFICIENCY ANEMIA, UNSPECIFIED IRON DEFICIENCY ANEMIA TYPE: Primary | ICD-10-CM

## 2024-05-15 RX ORDER — ERGOCALCIFEROL 1.25 MG/1
50000 CAPSULE ORAL
Qty: 12 CAPSULE | Refills: 0 | Status: SHIPPED | OUTPATIENT
Start: 2024-05-15 | End: 2024-08-01

## 2024-05-15 RX ORDER — ACETAMINOPHEN 500 MG
4000 TABLET ORAL DAILY
Qty: 180 CAPSULE | Refills: 3 | Status: SHIPPED | OUTPATIENT
Start: 2024-05-15 | End: 2025-05-15

## 2024-05-15 RX ORDER — FERROUS GLUCONATE 324(38)MG
324 TABLET ORAL
Qty: 90 TABLET | Refills: 1 | Status: SHIPPED | OUTPATIENT
Start: 2024-05-15 | End: 2024-11-11

## 2024-05-15 RX ORDER — ONDANSETRON 4 MG/1
4 TABLET, ORALLY DISINTEGRATING ORAL EVERY 12 HOURS PRN
Qty: 60 TABLET | Refills: 1 | Status: SHIPPED | OUTPATIENT
Start: 2024-05-15

## 2024-05-15 NOTE — TELEPHONE ENCOUNTER
----- Message from Porsha Goyal sent at 5/15/2024 12:58 PM CDT -----  Regarding: appt access  Contact: pt [phone]  Rx Refill/Request     Is this a Refill or New Rx:  Refill     Rx Name and Strength:  ondansetron (ZOFRAN-ODT) 4 MG TbDL   dicyclomine (BENTYL) 10 MG capsule     Preferred Pharmacy with phone number:      [id number] - [facility] [address] AT [facility] [address]  [address]  [facility] [address]  Phone: [phone] Fax: [phone]         Communication Preference:      Additional Information:

## 2024-05-15 NOTE — PROGRESS NOTES
SUZIE REMY team - please tell patient that they are iron deficient and anemic and recommend that they take ferrous gluconate one 324mg pill once daily for next 3 months.    SUZIE REMY team -  Please order repeat fasting Hemoglobin, Iron/TIBC, and Ferritin in 8 weeks - Orders placed.     SUZIE REMY team - Please tell patient that their Vitamin D level is low and recommend that they take Vitamin D 50,000 units ONCE A WEEK (every 7-days) for 12 weeks AND THEN start Over-The-Counter Vitamin D3 (4,000 units) over-the-counter ONCE DAILY.     SUZIE REMY team - please recheck their vitamin D level in 6 months - Orders placed.

## 2024-05-16 RX ORDER — DICYCLOMINE HYDROCHLORIDE 10 MG/1
10 CAPSULE ORAL EVERY 12 HOURS PRN
Qty: 60 CAPSULE | Refills: 0 | Status: SHIPPED | OUTPATIENT
Start: 2024-05-16 | End: 2024-05-22 | Stop reason: DRUGHIGH

## 2024-05-16 NOTE — TELEPHONE ENCOUNTER
"----- Message from Ana Cobian sent at 5/16/2024 11:29 AM CDT -----  Consult/Advisory:      Name Of Caller: Self    Contact Preference:   [phone]     What is the nature of the call?: Pt stated that the pharmacy still have not rec'd script , therefore would like to speak with Adams . Please call     Rx: dicyclomine (BENTYL) 10 MG capsule      Additional Notes:  "Thank you for all that you do for our patients"  "

## 2024-05-17 ENCOUNTER — PATIENT MESSAGE (OUTPATIENT)
Dept: GASTROENTEROLOGY | Facility: CLINIC | Age: 64
End: 2024-05-17
Payer: MEDICARE

## 2024-05-17 LAB
GLIADIN PEPTIDE IGA SER-ACNC: 1.2 U/ML
GLIADIN PEPTIDE IGG SER-ACNC: <0.6 U/ML
IGA SERPL-MCNC: 208 MG/DL (ref 70–400)
TTG IGA SER-ACNC: 0.3 U/ML
TTG IGG SER-ACNC: <0.6 U/ML

## 2024-05-17 NOTE — PROGRESS NOTES
Luciana your vitamin B12 level is slightly elevated this is most likely secondary to taking vitamin B12 supplement or multivitamin that has B12 in it if this is not the case please let me know

## 2024-05-22 ENCOUNTER — TELEPHONE (OUTPATIENT)
Dept: GASTROENTEROLOGY | Facility: CLINIC | Age: 64
End: 2024-05-22
Payer: MEDICARE

## 2024-05-22 DIAGNOSIS — R10.9 ABDOMINAL CRAMPS: Primary | ICD-10-CM

## 2024-05-22 RX ORDER — DICYCLOMINE HYDROCHLORIDE 20 MG/1
20 TABLET ORAL EVERY 12 HOURS PRN
Qty: 60 TABLET | Refills: 0 | Status: SHIPPED | OUTPATIENT
Start: 2024-05-22 | End: 2024-06-21

## 2024-05-22 NOTE — TELEPHONE ENCOUNTER
Bentyl 10 mg does not not come in tablet form.   She wants to know if you can give her 20mg as it come in tablet form.

## 2024-05-22 NOTE — TELEPHONE ENCOUNTER
----- Message from Radha Beltran sent at 5/22/2024 10:22 AM CDT -----  Regarding: Advise  Contact: [phone]  Patient is calling to speak to nurse in reference to medication: dicyclomine (BENTYL) 10 MG capsule. Pt states she would like to take the tablets instead of capsules. Patient would like nurse to give her a call to further discuss why she wants the tablets instead of capsules.

## 2024-05-23 ENCOUNTER — TELEPHONE (OUTPATIENT)
Dept: GASTROENTEROLOGY | Facility: CLINIC | Age: 64
End: 2024-05-23
Payer: MEDICARE

## 2024-05-23 ENCOUNTER — TELEPHONE (OUTPATIENT)
Dept: ENDOSCOPY | Facility: HOSPITAL | Age: 64
End: 2024-05-23
Payer: MEDICARE

## 2024-05-23 NOTE — TELEPHONE ENCOUNTER
----- Message from Tere Roa sent at 5/22/2024  5:47 PM CDT -----  Type:  Patient Returning Call    Who Called: Pt  Who Left Message for Patient:  Does the patient know what this is regarding?: prescription  Would the patient rather a call back or a response via [facility]?  Call  Best Call Back Number:  [phone]  Additional Information:  Pt would like to speak to provider or nurse concerning prescription.  Pt states she called earlier and has not received a response

## 2024-06-03 ENCOUNTER — TELEPHONE (OUTPATIENT)
Dept: ENDOSCOPY | Facility: HOSPITAL | Age: 64
End: 2024-06-03
Payer: MEDICARE

## 2024-06-03 NOTE — TELEPHONE ENCOUNTER
"----- Message from Beata Robertson sent at 2024  9:40 AM CDT -----    ----- Message -----  From: Cornell oCbb MD  Sent: 2024   5:25 PM CDT  To: [facility] Patients    Procedure: EGD    Diagnosis: Abdominal pain, early satiety inability gained weight    Procedure Timin-6 weeks    #If within 4 weeks selected, please vilma as high priority#    #If greater than 12 weeks, please select "5-12 weeks" and delay sending until 3 months prior to requested date#    Location: [address]    Additional Scheduling Information: No scheduling concerns    Prep Specifications:Standard prep    Is the patient taking a GLP-1 Agonist:no    Have you attached a patient to this message: yes  "

## 2024-06-03 NOTE — TELEPHONE ENCOUNTER
Contacted the patient to schedule an endoscopy procedure(s) 6/3/24. The patient did not answer the call and left a voice message requesting a call back.

## 2024-06-05 ENCOUNTER — PATIENT MESSAGE (OUTPATIENT)
Dept: GASTROENTEROLOGY | Facility: CLINIC | Age: 64
End: 2024-06-05
Payer: MEDICARE

## 2024-06-05 ENCOUNTER — TELEPHONE (OUTPATIENT)
Dept: ENDOSCOPY | Facility: HOSPITAL | Age: 64
End: 2024-06-05
Payer: MEDICARE

## 2024-06-05 NOTE — TELEPHONE ENCOUNTER
Endoscopy Scheduling Department  [facility]  [address] [place] [address]  Take the [facility] Elevators to 4th Floor Endoscopy Lab       Date: 6/5/24        Medical Record # [id number]        Dear  Luciana     An order for the following procedure(s) Upper Endoscopy (EGD) was placed for you by   Saba.     Since multiple attempts have been made to get in touch with you, this is the last notification. Please call the scheduling nurse to schedule this procedure as soon as possible.       If you have already scheduled this appointment, please disregard this letter. If you would like to cancel this request, please call the number listed below.      Sincerely,           Endoscopy Scheduling Department [phone]           Comments: Office hours are Monday through Friday 8-430p.       This Patient Portal message has not been read.

## 2024-06-05 NOTE — TELEPHONE ENCOUNTER
Contacted the patient to schedule an endoscopy procedure(s) 6/5/24. The patient did not answer the call and left a voice message requesting a call back.

## 2024-06-05 NOTE — TELEPHONE ENCOUNTER
"----- Message from Beata Robertson sent at 2024  8:34 AM CDT -----    ----- Message -----  From: Cornell Cobb MD  Sent: 5/15/2024  12:51 PM CDT  To: [facility] Patients    Procedure: EGD and colonoscopy     Diagnosis:  Iron deficiency anemia, Abdominal pain, early satiety inability gained weight     Procedure Timin-6 weeks     #If within 4 weeks selected, please vilma as high priority#     #If greater than 12 weeks, please select "5-12 weeks" and delay sending until 3 months prior to requested date#     Location: [address]     Additional Scheduling Information: No scheduling concerns     Prep Specifications:Standard prep     Is the patient taking a GLP-1 Agonist:no     Have you attached a patient to this message: yes  "

## 2024-07-02 ENCOUNTER — HOSPITAL ENCOUNTER (OUTPATIENT)
Dept: RADIOLOGY | Facility: OTHER | Age: 64
Discharge: HOME | End: 2024-07-02
Attending: INTERNAL MEDICINE
Payer: MEDICARE

## 2024-07-02 ENCOUNTER — TELEPHONE (OUTPATIENT)
Dept: GASTROENTEROLOGY | Facility: CLINIC | Age: 64
End: 2024-07-02
Payer: MEDICARE

## 2024-07-02 DIAGNOSIS — Z13.9 DUE FOR SCREENING: Primary | ICD-10-CM

## 2024-07-02 DIAGNOSIS — R10.13 EPIGASTRIC ABDOMINAL PAIN: ICD-10-CM

## 2024-07-02 DIAGNOSIS — M32.9 SLE (SYSTEMIC LUPUS ERYTHEMATOSUS RELATED SYNDROME): ICD-10-CM

## 2024-07-02 DIAGNOSIS — E43 SEVERE MALNUTRITION: ICD-10-CM

## 2024-07-02 DIAGNOSIS — R10.13 EPIGASTRIC ABDOMINAL PAIN: Primary | ICD-10-CM

## 2024-07-02 DIAGNOSIS — R68.81 EARLY SATIETY: ICD-10-CM

## 2024-07-02 NOTE — TELEPHONE ENCOUNTER
----- Message from Tere Miranda RT sent at 7/2/2024  8:38 AM CDT -----  Please order a STAT BUN and creatinine to check kidney function before the CT enterography today. Thanks.

## 2024-07-03 ENCOUNTER — HOSPITAL ENCOUNTER (OUTPATIENT)
Dept: RADIOLOGY | Facility: OTHER | Age: 64
Discharge: HOME | End: 2024-07-03
Attending: INTERNAL MEDICINE
Payer: MEDICARE

## 2024-07-03 PROCEDURE — 74177 CT ABD & PELVIS W/CONTRAST: CPT | Mod: TC

## 2024-07-03 PROCEDURE — 74177 CT ABD & PELVIS W/CONTRAST: CPT | Mod: 26,,, | Performed by: RADIOLOGY

## 2024-07-03 PROCEDURE — A9698 NON-RAD CONTRAST MATERIALNOC: HCPCS | Performed by: INTERNAL MEDICINE

## 2024-07-03 PROCEDURE — 25500020 PHARM REV CODE 255: Performed by: INTERNAL MEDICINE

## 2024-07-03 RX ADMIN — IOHEXOL 150 ML: 350 INJECTION, SOLUTION INTRAVENOUS at 03:07

## 2024-07-03 RX ADMIN — BARIUM SULFATE 1350 ML: 1 SUSPENSION ORAL at 02:07

## 2024-07-07 ENCOUNTER — PATIENT MESSAGE (OUTPATIENT)
Dept: GASTROENTEROLOGY | Facility: CLINIC | Age: 64
End: 2024-07-07
Payer: MEDICAID

## 2024-07-09 ENCOUNTER — TELEPHONE (OUTPATIENT)
Dept: GASTROENTEROLOGY | Facility: CLINIC | Age: 64
End: 2024-07-09
Payer: MEDICAID

## 2024-07-09 DIAGNOSIS — K58.9 IRRITABLE BOWEL SYNDROME, UNSPECIFIED TYPE: Primary | ICD-10-CM

## 2024-07-09 RX ORDER — DICYCLOMINE HYDROCHLORIDE 20 MG/1
20 TABLET ORAL EVERY 12 HOURS PRN
Qty: 120 TABLET | Refills: 0 | Status: SHIPPED | OUTPATIENT
Start: 2024-07-09 | End: 2024-09-07

## 2024-07-09 NOTE — TELEPHONE ENCOUNTER
----- Message from Nadia Buck sent at 7/9/2024  3:49 PM CDT -----  Regarding: pt advice  Contact: pt@ [phone]  Pt requesting a call back to discuss plan of care and rx please call to discuss  pt @[phone]

## 2024-09-27 DIAGNOSIS — R11.0 NAUSEA: ICD-10-CM

## 2024-09-27 NOTE — TELEPHONE ENCOUNTER
----- Message from Luz Hein sent at 9/27/2024  3:18 PM CDT -----  Regarding: Pt called states need refills on the Below Rx    Can the clinic reply in [facility]:        Please refill the medication(s) listed below.Pt called states need refills on the Below Rx. Please advise       Medication #1 ondansetron (ZOFRAN-ODT) 4 MG TbDL      Medication #2          [facility] Pharmacy:   [id number] - [address] AT [address] [address]  Phone: [phone] Fax: [phone]

## 2024-09-28 RX ORDER — ONDANSETRON 4 MG/1
4 TABLET, ORALLY DISINTEGRATING ORAL EVERY 12 HOURS PRN
Qty: 60 TABLET | Refills: 1 | Status: SHIPPED | OUTPATIENT
Start: 2024-09-28

## 2024-10-10 ENCOUNTER — TELEPHONE (OUTPATIENT)
Dept: RHEUMATOLOGY | Facility: CLINIC | Age: 64
End: 2024-10-10
Payer: MEDICARE

## 2024-10-10 NOTE — TELEPHONE ENCOUNTER
----- Message from Jo sent at 10/10/2024 11:28 AM CDT -----  Type:  Needs Medical Advice    Who Called: pt  Symptoms (please be specific): pt needs a call back missed her appt today  did not know where she was going needs to get in for her lupus please pt has been waiting for months for this appt will do a virtual if needed   Would the patient rather a call back or a response via [facility]? call  Best Call Back Number: [phone]  Additional Information:

## 2024-10-18 ENCOUNTER — TELEPHONE (OUTPATIENT)
Dept: GASTROENTEROLOGY | Facility: CLINIC | Age: 64
End: 2024-10-18
Payer: MEDICARE

## 2024-10-18 NOTE — TELEPHONE ENCOUNTER
----- Message from Wendy sent at 10/18/2024  4:05 PM CDT -----  Regarding: rx refill  Contact: pt @ [phone]  Rx Refill/Request Is this a Refill or New Rx: refill     Rx Name and Strength: dicyclomine (BENTYL) 20 mg tablet and pantoprazole (PROTONIX) 20 MG tablet    Preferred Pharmacy with phone number:  [facility][id number] [address] [address]  Phone: [phone] Fax: [phone]        Communication Preference: call back     Additional Information:    Pt is requesting a callback from Mary in the office in reference to Rx refills of dicyclomine (BENTYL) 20 mg tablet and pantoprazole (PROTONIX) 20 MG tablet. Please call to advise further. Thank you for all you are doing.

## 2024-10-21 ENCOUNTER — TELEPHONE (OUTPATIENT)
Dept: GASTROENTEROLOGY | Facility: CLINIC | Age: 64
End: 2024-10-21
Payer: MEDICARE

## 2024-10-21 NOTE — TELEPHONE ENCOUNTER
----- Message from Radha Beltran sent at 10/21/2024 11:29 AM CDT -----  Regarding: Refill  Contact: [phone]  Is this a Refill or New Rx (Script/Out of Refills):  Refill     Name of Caller: Patient     Rx Name: dicyclomine (BENTYL) 20 mg tablet    Pharmacy Name:   [facility][id number] [address] AT [address] [address]  Phone: [phone] Fax: [phone]    Additional Information: Patient has been calling for several days to get medication refilled. Pt is asking for a return call from office. Please give pt a call.

## 2024-10-22 ENCOUNTER — TELEPHONE (OUTPATIENT)
Dept: GASTROENTEROLOGY | Facility: CLINIC | Age: 64
End: 2024-10-22
Payer: MEDICARE

## 2024-10-22 DIAGNOSIS — K58.9 IRRITABLE BOWEL SYNDROME, UNSPECIFIED TYPE: Primary | ICD-10-CM

## 2024-10-22 RX ORDER — DICYCLOMINE HYDROCHLORIDE 20 MG/1
20 TABLET ORAL EVERY 12 HOURS PRN
Qty: 120 TABLET | Refills: 0 | Status: SHIPPED | OUTPATIENT
Start: 2024-10-22 | End: 2024-12-21

## 2024-10-22 NOTE — TELEPHONE ENCOUNTER
----- Message from Cornell Cobb MD sent at 10/22/2024  4:07 PM CDT -----  Regarding: RE: Requesting a call back  Contact: [phone]  Refilled thank you  ----- Message -----  From: Shannon Rubalcava MA  Sent: 10/22/2024   3:31 PM CDT  To: Cornell Cobb MD  Subject: FW: Requesting a call back                       Spoke with patient.   Stated she did not see your message you sent to her on 10/16. You denied her request for refill on 10/21 because she did not respond.  She stated the Bentyl has helped a lot.   Since she has been out of it for the past couple of weeks she has seen a big difference.   She feels she needs to be back on it and asked that you please refill.   Amber  ----- Message -----  From: Maria Fernanda Anderson  Sent: 10/22/2024   2:01 PM CDT  To: Reza MORLEY Staff  Subject: Requesting a call back                           Type:  Needs Medical Advice    Who Called: Luciana Chowdhury called requesting a call back from Dr Cobb She states she never received a call back       Rx Name: dicyclomine (BENTYL) 20 mg tablet     Pharmacy Name:   [id number] [address] AT [address] [address]  Phone: [phone] Fax: [phone]    Would the patient rather a call back or a response via [facility]? Call back  Best Call Back Number: [phone]    Additional Information:

## 2024-11-16 NOTE — HOSPITAL COURSE
Chief Complaint   Patient presents with    Cold Symptoms     also going to bring my daughter Eleonora Ram for a same room visit - Entered by patient       PPE worn by writer: gloves, gown, face shield/goggles and N95 mask    History of Present Illness: Sumi Roque is a very pleasant 33 year old female who presents with a cough.  Cough has been present for a duration of 3 days.  The quality is described as primarily dry.  Associated symptoms include sinus pressure, fatigue and 1 episode of loose stool earlier today. The timing is described as episodic..    ROS:  Constitutional:  No fever.  No chest pain or shortness of breath    SHx:   Social History     Tobacco Use   Smoking Status Never    Passive exposure: Past   Smokeless Tobacco Never        Physical Exam:  Visit Vitals  /84 (BP Location: LUE - Left upper extremity, Patient Position: Sitting, Cuff Size: Regular)   Pulse 88   Temp 97.4 °F (36.3 °C) (Temporal)   Resp 16   Wt 67 kg (147 lb 12.8 oz)   LMP 10/08/2024 (Approximate)   SpO2 97%   BMI 27.03 kg/m²       General:  The patient is well-nourished and in no acute distress. Not septic appearing.  HEENT: Normocephalic and round and responsive to light and accommodation, extraocular muscles are intact.  The sclerae are anicteric and the conjunctivae are not injected. The auditory canals are clear. The tympanic membranes are pearly. Oropharynx reveals moist mucous membranes without exudate.  NECK: Supple without lymphadenopathy.  RESP: Non-labored. Breath sounds are clear throughout.  CV: Regular rate and rhythm without murmur, gallop or rub. Normal S1/S2.  ABD: Non-distended.    Impression:  1. Upper respiratory tract infection, unspecified type      The patient's history and exam suggest a viral upper respiratory infection.  I discussed this with her.  The plan will be supportive measures including rest, fluids and over-the-counter cough and cold medication as needed.  I provided her with home care  Mrs. Berger was admitted to observation for abnormal weight loss. GI consulted, recommend gastric emptying study, ordered. Hgb 12.2-->9.2, anemia work up pending though could be dilutional as patient given 2L IVF and started on mIVF.  No active signs of bleeding. TSH WNL. Celiac panel pending. Gastric emptying study negative for acute pathology. Suspect IBS symptoms. Patient stable for discharge with GI follow up in clinic.   instructions on the after visit summary.  She is to seek return care should she develop worsening or new symptoms such as fever, chills, chest pain or shortness of breath.

## 2024-11-22 ENCOUNTER — PATIENT MESSAGE (OUTPATIENT)
Dept: DIABETES | Facility: CLINIC | Age: 64
End: 2024-11-22
Payer: MEDICARE

## 2024-12-30 DIAGNOSIS — R11.0 NAUSEA: ICD-10-CM

## 2024-12-30 RX ORDER — ONDANSETRON 4 MG/1
4 TABLET, ORALLY DISINTEGRATING ORAL EVERY 12 HOURS PRN
Qty: 60 TABLET | Refills: 1 | Status: SHIPPED | OUTPATIENT
Start: 2024-12-30

## 2025-01-15 ENCOUNTER — DOCUMENTATION ONLY (OUTPATIENT)
Dept: GASTROENTEROLOGY | Facility: CLINIC | Age: 65
End: 2025-01-15
Payer: MEDICARE

## 2025-01-15 ENCOUNTER — TELEPHONE (OUTPATIENT)
Dept: GASTROENTEROLOGY | Facility: CLINIC | Age: 65
End: 2025-01-15
Payer: MEDICARE

## 2025-01-15 ENCOUNTER — PATIENT MESSAGE (OUTPATIENT)
Dept: GASTROENTEROLOGY | Facility: CLINIC | Age: 65
End: 2025-01-15

## 2025-01-15 DIAGNOSIS — R10.13 EPIGASTRIC PAIN: ICD-10-CM

## 2025-01-15 DIAGNOSIS — R10.9 ABDOMINAL CRAMPS: Primary | ICD-10-CM

## 2025-01-15 DIAGNOSIS — K59.09 CONSTIPATION, CHRONIC: ICD-10-CM

## 2025-01-15 DIAGNOSIS — D50.9 IRON DEFICIENCY ANEMIA, UNSPECIFIED IRON DEFICIENCY ANEMIA TYPE: ICD-10-CM

## 2025-01-15 DIAGNOSIS — R14.0 POSTPRANDIAL BLOATING: Primary | ICD-10-CM

## 2025-01-15 DIAGNOSIS — R19.4 CHANGE IN BOWEL HABIT: ICD-10-CM

## 2025-01-15 RX ORDER — DICYCLOMINE HYDROCHLORIDE 20 MG/1
20 TABLET ORAL EVERY 12 HOURS
Qty: 60 TABLET | Refills: 0 | Status: SHIPPED | OUTPATIENT
Start: 2025-01-15 | End: 2025-02-14

## 2025-01-16 NOTE — PROGRESS NOTES
"I spoke with patient on the phone today she is having epigastric pain postprandial prandial abdominal bloating change in bowel habits constipation having a bowel movement every 3 days if she does not take something she would like to have some more dicyclomine she knows that that can make her more constipated will set her up for abdominal ultrasound 12 hour fasting blood work EGD colonoscopy for further evaluation.    Procedure: EGD/Colonoscopy    Diagnosis:  Epigastric pain, postprandial abdominal bloating, anemia    Procedure Timin-5 weeks    *If within 4 weeks selected, please vilma as high priority*    *If greater than 12 weeks, please select "5-12 weeks" and delay sending until 3 months prior to requested date*     Location: Any Site    Additional Scheduling Information: No scheduling concerns    Prep Specifications:Extended/Constipation prep    Is the patient taking a GLP-1 Agonist:no    Have you attached a patient to this message: yes     "

## 2025-01-17 ENCOUNTER — TELEPHONE (OUTPATIENT)
Dept: ENDOSCOPY | Facility: HOSPITAL | Age: 65
End: 2025-01-17
Payer: MEDICARE

## 2025-01-17 NOTE — TELEPHONE ENCOUNTER
"Attempted contact to schedule EGD/colonoscopy.  No answer-left main line phone number to return call.            ----- Message -----   From: Cornell Cobb MD   Sent: 1/15/2025   6:20 PM CST   To: [facility]     Procedure: EGD/Colonoscopy     Diagnosis:  Epigastric pain, postprandial abdominal bloating, anemia     Procedure Timin-5 weeks     *If within 4 weeks selected, please vilma as high priority*     *If greater than 12 weeks, please select "5-12 weeks" and delay sending until 3 months prior to requested date*     Location: Any Site     Additional Scheduling Information: No scheduling concerns     Prep Specifications:Extended/Constipation prep     Is the patient taking a GLP-1 Agonist:no     Have you attached a patient to this message: yes     "

## 2025-01-28 ENCOUNTER — TELEPHONE (OUTPATIENT)
Dept: ENDOSCOPY | Facility: HOSPITAL | Age: 65
End: 2025-01-28
Payer: MEDICARE

## 2025-01-28 NOTE — TELEPHONE ENCOUNTER
Attempted contact again to schedule EGD/colonoscopy.  No answer-left main line phone number to return call

## 2025-02-17 ENCOUNTER — PATIENT MESSAGE (OUTPATIENT)
Dept: GASTROENTEROLOGY | Facility: CLINIC | Age: 65
End: 2025-02-17
Payer: MEDICARE

## 2025-02-18 ENCOUNTER — HOSPITAL ENCOUNTER (OUTPATIENT)
Dept: RADIOLOGY | Facility: OTHER | Age: 65
Discharge: HOME | End: 2025-02-18
Attending: INTERNAL MEDICINE
Payer: MEDICARE

## 2025-02-18 ENCOUNTER — RESULTS FOLLOW-UP (OUTPATIENT)
Dept: GASTROENTEROLOGY | Facility: CLINIC | Age: 65
End: 2025-02-18

## 2025-02-18 ENCOUNTER — PATIENT MESSAGE (OUTPATIENT)
Dept: GASTROENTEROLOGY | Facility: CLINIC | Age: 65
End: 2025-02-18
Payer: MEDICARE

## 2025-02-18 DIAGNOSIS — D50.9 IRON DEFICIENCY ANEMIA, UNSPECIFIED IRON DEFICIENCY ANEMIA TYPE: ICD-10-CM

## 2025-02-18 DIAGNOSIS — R10.13 EPIGASTRIC PAIN: ICD-10-CM

## 2025-02-18 DIAGNOSIS — R14.0 POSTPRANDIAL BLOATING: ICD-10-CM

## 2025-02-18 DIAGNOSIS — K59.09 CONSTIPATION, CHRONIC: ICD-10-CM

## 2025-02-18 DIAGNOSIS — R19.4 CHANGE IN BOWEL HABIT: ICD-10-CM

## 2025-02-18 PROCEDURE — 76700 US EXAM ABDOM COMPLETE: CPT | Mod: TC

## 2025-02-19 ENCOUNTER — PATIENT MESSAGE (OUTPATIENT)
Dept: GASTROENTEROLOGY | Facility: CLINIC | Age: 65
End: 2025-02-19
Payer: MEDICARE

## 2025-02-19 NOTE — PROGRESS NOTES
Mary please schedule Luciana for follow-up GI clinic appointment okay for telemedicine video visit in the next 2-4 weeks.    Luciana radiology is reading your ultrasound is normal.    EXAMINATION:  US ABDOMEN COMPLETE     CLINICAL HISTORY:  Postprandial abdominal bloating and epigastric pain rule out gallstones; Abdominal distension (gaseous)     TECHNIQUE:  Complete abdominal ultrasound (including pancreas, liver, gallbladder, common bile duct, spleen, aorta, IVC, and kidneys) was performed.     COMPARISON:  None     FINDINGS:  The visualized portions of the pancreas, aorta, and IVC are unremarkable.     Gallbladder: No measurable gallstones, gallbladder wall thickening, or focal tenderness over the gallbladder.     Biliary system: Common bile duct is within normal limits measuring 6 mm. No intrahepatic ductal dilatation.     Liver: The liver measures 14.4 cm in length.  Homogeneous echotexture noted.     Kidneys: The right kidney is normal in length measuring 9.9 cm.     The left kidney is normal in length measuring 10.6 cm.  No hydronephrosis or renal masses.     Spleen: The spleen is unremarkable measuring 6.8 cm in length.     Impression:     No sonographic abnormality.        Electronically signed by:Moy Lozoya MD  Date:                                            02/18/2025

## 2025-02-21 ENCOUNTER — TELEPHONE (OUTPATIENT)
Dept: GASTROENTEROLOGY | Facility: CLINIC | Age: 65
End: 2025-02-21
Payer: MEDICARE

## 2025-02-21 NOTE — TELEPHONE ENCOUNTER
----- Message from Karla sent at 2/21/2025  3:47 PM CST -----  Regarding: Refill Request  Contact: Luciana  REFILL REQUESTWho Called: DeniseRefill or New Rx: refillRx Name and Strength:dicyclomine (BENTYL) 20 mg tabletPreferred Pharmacy with phone number:[id number] [address] [facility] AT [address] [address]Yvhkv: [phone] Fax: [phone] Local or Mail Order: localWould the patient rather a call back or a response via [facility]? Call back

## 2025-02-22 DIAGNOSIS — R10.9 ABDOMINAL CRAMPS: Primary | ICD-10-CM

## 2025-02-22 RX ORDER — DICYCLOMINE HYDROCHLORIDE 20 MG/1
20 TABLET ORAL EVERY 12 HOURS PRN
Qty: 60 TABLET | Refills: 1 | Status: SHIPPED | OUTPATIENT
Start: 2025-02-22 | End: 2025-02-24 | Stop reason: SDUPTHER

## 2025-02-23 ENCOUNTER — PATIENT MESSAGE (OUTPATIENT)
Dept: GASTROENTEROLOGY | Facility: CLINIC | Age: 65
End: 2025-02-23
Payer: MEDICARE

## 2025-02-23 DIAGNOSIS — D50.9 IRON DEFICIENCY ANEMIA, UNSPECIFIED IRON DEFICIENCY ANEMIA TYPE: Primary | ICD-10-CM

## 2025-02-23 NOTE — PROGRESS NOTES
GI MA team - please tell patient that they are iron deficient and anemic and recommend that they take over-the-counter Slow Fe, High Potency Iron 45 mg, Slow Release.  They should take only 1 pill by mouth once daily or every-other-day for the next 3 months.    GI MA team please schedule patient for 12 hour fasting to check Hemoglobin, Iron/TIBC and ferritin levels in the next 8 weeks to recheck.  Orders placed.    See below administration recommendations    Remember this medication may increased constipation and may turn your stools dark green or black in color       Ferrous sulfate with mucoproteose (Slow Fe, High Potency Iron 45 mg, Slow Release) had the lowest incidence of adverse effects:  This medicine is over-the-counter  4.1% overall adverse effects  3.7% gastrointestinal adverse effects      Administration Recommendations  Take on an empty stomach, 30 minutes before or 2 hours after meals  Can be taken with orange juice or vitamin C to improve absorption  Avoid taking with tea, coffee, eggs, dairy, or high-fiber foods  Leave a 2-hour gap between ferrous sulfate and these foods/drinks

## 2025-02-24 DIAGNOSIS — R10.9 ABDOMINAL CRAMPS: ICD-10-CM

## 2025-02-24 RX ORDER — DICYCLOMINE HYDROCHLORIDE 20 MG/1
20 TABLET ORAL EVERY 12 HOURS PRN
Qty: 60 TABLET | Refills: 1 | Status: SHIPPED | OUTPATIENT
Start: 2025-02-24

## 2025-03-12 ENCOUNTER — PATIENT MESSAGE (OUTPATIENT)
Dept: GASTROENTEROLOGY | Facility: CLINIC | Age: 65
End: 2025-03-12

## 2025-03-12 ENCOUNTER — OFFICE VISIT (OUTPATIENT)
Dept: GASTROENTEROLOGY | Facility: CLINIC | Age: 65
End: 2025-03-12
Payer: MEDICARE

## 2025-03-12 DIAGNOSIS — R10.13 EPIGASTRIC PAIN: Primary | ICD-10-CM

## 2025-03-12 DIAGNOSIS — E61.1 IRON DEFICIENCY: ICD-10-CM

## 2025-03-12 DIAGNOSIS — K21.9 GASTROESOPHAGEAL REFLUX DISEASE, UNSPECIFIED WHETHER ESOPHAGITIS PRESENT: Primary | ICD-10-CM

## 2025-03-12 PROBLEM — F13.20 BENZODIAZEPINE DEPENDENCE: Status: ACTIVE | Noted: 2025-03-12

## 2025-03-12 PROCEDURE — 1160F RVW MEDS BY RX/DR IN RCRD: CPT | Mod: CPTII,95,, | Performed by: INTERNAL MEDICINE

## 2025-03-12 PROCEDURE — 98005 SYNCH AUDIO-VIDEO EST LOW 20: CPT | Mod: 95,,, | Performed by: INTERNAL MEDICINE

## 2025-03-12 PROCEDURE — 1159F MED LIST DOCD IN RCRD: CPT | Mod: CPTII,95,, | Performed by: INTERNAL MEDICINE

## 2025-03-12 RX ORDER — RABEPRAZOLE SODIUM 20 MG/1
20 TABLET, DELAYED RELEASE ORAL
Qty: 90 TABLET | Refills: 3 | Status: SHIPPED | OUTPATIENT
Start: 2025-03-12 | End: 2026-03-12

## 2025-03-12 NOTE — Clinical Note
Mary also please refer Luciana to Cardiology for preop clearance for EGD to rule out cardiovascular disease as a cause of her symptoms referral placed

## 2025-03-12 NOTE — Clinical Note
Procedure: EGD  Diagnosis:  Epigastric pain and GERD  Procedure Timing: Within 4 weeks (Urgent)  Location: Any Site  Additional Scheduling Information: No scheduling concerns  Prep Specifications:Standard prep  Is the patient taking a GLP-1 Agonist:no  Have you attached a patient to this message: yes

## 2025-03-12 NOTE — PROGRESS NOTES
The patient location is:  In [place]  The chief complaint leading to consultation is:  Worsening heartburn and epigastric pain  Visit type: audiovisual  Face to Face time with patient: 20 minutes of total time spent on the encounter, which includes face to face time and non-face to face time preparing to see the patient (eg, review of tests), Obtaining and/or reviewing separately obtained history, Documenting clinical information in the electronic or other health record, Independently interpreting results (not separately reported) and communicating results to the patient/family/caregiver, or Care coordination (not separately reported).     Each patient to whom he or she provides medical services by telemedicine is:  (1) informed of the relationship between the physician and patient and the respective role of any other health care provider with respect to management of the patient; and (2) notified that he or she may decline to receive medical services by telemedicine and may withdraw from such care at any time.    Notes:           [facility] Consultation Note    Reason for Consult:  The primary encounter diagnosis was Epigastric pain. A diagnosis of Iron deficiency was also pertinent to this visit.    PCP:   Sebastien Yousif Jr.       Referring MD:  No referring provider defined for this encounter.    Initial History of Present Illness (HPI):  This is a 64 y.o. female here for evaluation 2 month history of epigastric pain postprandial and worsening heartburn symptoms.  She denies any chest pain she is on pantoprazole 40 mg once daily without much benefit says symptoms are worsened she would like further evaluation with CT scan for epigastric pain an EGD will refer to cardiology to rule out cardiac disease will change her pantoprazole to rabeprazole 20 mg once daily.  No shortness of breath no vomiting occasional nausea.  SLE diagnosed about 7 years ago she says she is managed by a  rheumatologist. No odynophagia no dysphagia no chest pain no shortness of breath no flushing no wheezing no palpitations no blood in stool.  No pleurisy, no dysuria urgency or frequency.  She is currently not interested in a colonoscopy.  No exertional chest pain.     Abdominal pain - as above  Reflux - as above  Dysphagia - no   Bowel habits - normal  GI bleeding - none  NSAID usage - none     Interval HPI 05/13/2024:  The patient's last visit with me was on 6/18/2021.        ROS:  Constitutional: No fevers, chills, No weight loss  ENT:  As above heartburn no dysphagia no odynophagia no hoarseness  CV: No chest pain, no palpitation  Pulm: No cough, No shortness of breath, no wheezing  Ophtho: No vision changes  GI: see HPI  Derm: No rash, no itching  Heme: No lymphadenopathy, No easy bruising  MSK: No significant arthritis  : No dysuria, No hematuria  Endo: No hot or cold intolerance  Neuro: No syncope, No seizure, no strokes  Psych: No uncontrolled anxiety, No uncontrolled depression      Interval HPI 03/12/2025:  The patient's last visit with me was on 5/13/2024.      Medical History:  has a past medical history of Anxiety, Arthritis, GERD (gastroesophageal reflux disease), and Neuromuscular disorder.    Surgical History:  has a past surgical history that includes left breast surgery in 1989 (Left); Esophagogastroduodenoscopy (N/A, 8/12/2020); and Colonoscopy (N/A, 8/12/2020).    Family History: family history includes Cancer in her mother; Heart disease in her father..     Social History:  reports that she has never smoked. She has never used smokeless tobacco. She reports that she does not drink alcohol and does not use drugs.    Review of patient's allergies indicates:  No Known Allergies    Medication List with Changes/Refills   New Medications    RABEPRAZOLE (ACIPHEX) 20 MG TABLET    Take 1 tablet (20 mg total) by mouth before breakfast. Best taken 45-60 minutes before your 1st protein meal of the day  breakfast   Current Medications    CHOLECALCIFEROL, VITAMIN D3, (VITAMIN D3) 50 MCG (2,000 UNIT) CAP CAPSULE    Take 2 capsules (4,000 Units total) by mouth once daily.    CYANOCOBALAMIN 1,000 MCG/ML INJECTION    INJECT 1 ML INTO THE SKIN ONCE A WEEK    DICYCLOMINE (BENTYL) 20 MG TABLET    Take 1 tablet (20 mg total) by mouth every 12 (twelve) hours as needed (Abdominal cramps).    FLUTICASONE PROPIONATE (FLONASE) 50 MCG/ACTUATION NASAL SPRAY    1 spray by Each Nostril route as needed.    HYDROXYCHLOROQUINE (PLAQUENIL) 200 MG TABLET    Take 200 mg by mouth once daily.    LIDOCAINE HCL 2% (XYLOCAINE) 2 % SOLN    GARGLE AND SPIT 1 TEASPOON EVERY 6 HOURS AS NEEDED FOR SORE THROAT    LORAZEPAM (ATIVAN) 1 MG TABLET    Take 1 mg by mouth 2 (two) times daily.    METHOCARBAMOL (ROBAXIN) 500 MG TAB    Take 500 mg by mouth 3 (three) times daily as needed.    NEOMYCIN-POLYMYXIN-DEXAMETHASONE (MAXITROL) 3.5MG/ML-10,000 UNIT/ML-0.1 % DRPS    Place 1 drop into both eyes.    ONDANSETRON (ZOFRAN-ODT) 4 MG TBDL    Take 1 tablet (4 mg total) by mouth every 12 (twelve) hours as needed (Nausea).    OXYCODONE-ACETAMINOPHEN (PERCOCET) 5-325 MG PER TABLET    Take 1 tablet by mouth every 4 (four) hours as needed for Pain.    SOTALOL (BETAPACE) 80 MG TABLET    Take 80 mg by mouth 2 (two) times daily.    ZOLPIDEM (AMBIEN) 10 MG TAB    Take 10 mg by mouth nightly as needed.         Objective Findings:    Vital Signs:  [facility] 10/24/2013   There is no height or weight on file to calculate BMI.    Physical Exam:  Telemedicine video visit  General Appearance: Well appearing in no acute distress  Neurologic:  Alert and oriented x4  Psychiatric:  Normal speech mentation and affect    Labs:  Lab Results   Component Value Date    WBC 2.53 (L) 02/18/2025    HGB 11.7 (L) 02/18/2025    HCT 35.3 (L) 02/18/2025     02/18/2025    ALT 17 02/18/2025    AST 26 02/18/2025     02/18/2025    K 4.0 02/18/2025     02/18/2025    CREATININE 0.7  02/18/2025    BUN 7 (L) 02/18/2025    CO2 25 02/18/2025    TSH 0.732 05/14/2024    HGBA1C 5.3 07/24/2020       Medical Decision Making:  The Olympus scope GIF- ([id number]) was introduced                         through the mouth, and advanced to the third part                         of duodenum.   Findings:       The examined duodenum was normal. Biopsies for histology were taken        with a cold forceps for evaluation of celiac disease. Biopsies were        taken with a cold forceps for histology. Estimated blood loss was        minimal.        The entire examined stomach was normal. Biopsies were taken with a        cold forceps for histology. Estimated blood loss was minimal.        The cardia and gastric fundus were normal on retroflexion.        The examined esophagus was normal. Z-line was sharp. No esophagitis        and no columnar esophagus and no lesions seen with NBI or white        light.        The Z-line was regular and was found 39 cm from the incisors.   Impression:           - Normal examined duodenum. Biopsied.                         - Normal stomach. Biopsied.                         - Normal esophagus.                         - Z-line regular, 39 cm from the incisors.   Recommendation:       - Discharge patient to home.                         - Await pathology results.                         - Telephone endoscopist for pathology results in 2                         weeks.                         - Return to GI clinic.                         - The findings and recommendations were discussed                         with the patient.                         - Return to primary care physician.   Attending Participation:        I personally performed the entire procedure.   Cornell Cobb MD   8/12/2020    The Olympus scope PCF-H190DL ([id number]) was                         introduced through the anus and advanced to 20 cm                         into the ileum. The colonoscopy was  performed                         without difficulty. The patient tolerated the                         procedure well. The quality of the bowel                         preparation was excellent. Scope insertion time was                         3 minutes. Scope withdrawal time was 12 minutes.                         Good look. The terminal ileum, ileocecal valve,                         appendiceal orifice, and rectum were photographed.   Findings:       The terminal ileum appeared normal. Biopsies were taken with a cold        forceps for histology. Estimated blood loss was minimal.        Non-bleeding internal hemorrhoids were found during retroflexion.        The hemorrhoids were mild.        Diverticula were found in the recto-sigmoid colon, sigmoid colon,        descending colon and transverse colon.        The colon (entire examined portion) appeared normal. Biopsies for        histology were taken with a cold forceps from the entire colon for        evaluation of microscopic colitis. Estimated blood loss was minimal.        The perianal and digital rectal examinations were normal.   Impression:           - The examined portion of the ileum was normal.                         Biopsied.                         - Non-bleeding internal hemorrhoids.                         - Diverticulosis in the recto-sigmoid colon, in the                         sigmoid colon, in the descending colon and in the                         transverse colon.                         - The entire examined colon is normal. Biopsied.   Recommendation:       - Discharge patient to home.                         - Await pathology results.                         - Telephone endoscopist for pathology results in 2                         weeks.                         - Return to primary care physician.                         - Return to GI clinic.                         - Repeat colonoscopy in 10 years for screening                          purposes.                         - THIS RECOMMENDATION of 10-Years FOR NEXT                         SCREENING COLONOSCOPY ASSUMES THAT YOU WILL NOT                         HAVE HAD OR NOT HAD A FIRST OR SECOND DEGREE                         RELATIVE/S WITH COLORECTAL CANCER OR AN ADVANCE                         COLON ADENOMAS BEFORE THE AGE OF 60 YEARS OF AGE OF                         THOSE INDIVIDUALS BY THE TIME OF YOUR NEXT                         SCREENING COLONOSCOPY.                         - The findings and recommendations were discussed                         with the patient.   Attending Participation:        I personally performed the entire procedure.   Cornell Cobb MD   8/12/2020      1.  Duodenum (biopsy):  - Benign small intestinal mucosa, no histologic abnormality  - No villous blunting or intraepithelial lymphocytosis  2.  Stomach (biopsy):  - Benign oxyntic and antral-type mucosa, no histologic abnormality  - No H.pylori organism identified on H&E stained sections  - No intestinal metaplasia, dysplasia or malignancy  3.  Terminal ileum (biopsy):  - Benign small intestinal mucosa, no histologic abnormality  - No villous blunting or intraepithelial lymphocytosis  - No chronic or active enteritis  4.  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  Report attached.  Performing site:  [facility]  [facility]ology [facility]ld[facility]  [address] [place] [address]     Comment: Interp By Garret Palumbo M.D., Signed on 08/24/2020          Assessment:  1. Epigastric pain    2. Iron deficiency         Recommendations:  1. 12 hour fasting blood work  2. CT enterography for further  evaluation of iron deficiency anemia and epigastric pain  3. Referral to cardiology to rule out cardiac disease preop clearance  4. Will discontinue pantoprazole start rabeprazole 20 mg once daily best taken 45-60 minutes before your 1st protein meal of the day breakfast  5. Referral to endoscopy schedulers for EGD evaluation of worsening heartburn  6. Return to GI clinic 6 weeks for follow-up sooner if symptoms worsen     Follow up in about 6 weeks (around 4/23/2025).      Order summary:  Orders Placed This Encounter    CT Enterography Abd_Pelvis With Contrast    Lipase    Hemoglobin    Ferritin    Iron and TIBC    Ambulatory referral/consult to Hematology / Oncology         Thank you so much for allowing me to participate in the care of Luciana Karena Cobb MD    DISCLAIMER: This note was prepared with [employer] voice recognition transcription software. Garbled syntax, mangled or inadvertent pronouns, and other bizarre constructions may be attributed to that software system. While efforts were made to correct any mistakes made by this voice recognition program, some errors and/or omissions may remain in the note that were missed when the note was originally created.

## 2025-03-12 NOTE — Clinical Note
Mary please schedule Luciana for 12 hour fasting blood work this week at [facility].  Please schedule her for CT enterography across the street at the imaging center next available  Referral has been placed endoscopy schedulers for EGD  Please schedule her telemedicine video visit follow-up with me in 6-8 weeks okay for after hours visit thank you

## 2025-03-14 ENCOUNTER — TELEPHONE (OUTPATIENT)
Dept: ENDOSCOPY | Facility: HOSPITAL | Age: 65
End: 2025-03-14
Payer: MEDICARE

## 2025-03-14 VITALS — WEIGHT: 99 LBS | BODY MASS INDEX: 17.54 KG/M2 | HEIGHT: 63 IN

## 2025-03-14 DIAGNOSIS — K21.9 GASTROESOPHAGEAL REFLUX DISEASE, UNSPECIFIED WHETHER ESOPHAGITIS PRESENT: Primary | ICD-10-CM

## 2025-03-14 DIAGNOSIS — R10.13 EPIGASTRIC PAIN: ICD-10-CM

## 2025-03-14 NOTE — TELEPHONE ENCOUNTER
Referral for procedure from [facility]      Spoke to Luciana to schedule procedure(s) Upper Endoscopy (EGD)       Physician to perform procedure(s) Dr. JASMINE Cobb  Date of Procedure (s) 3/31/25  Arrival Time 9:30 AM  Time of Procedure(s) 10:30 AM   Location of Procedure(s) [facility]  Type of Rx Prep sent to patient: Other  Instructions provided to patient via [facility]    Patient was informed on the following information and verbalized understanding. Screening questionnaire reviewed with patient and complete. If procedure requires anesthesia, a responsible adult needs to be present to accompany the patient home, patient cannot drive after receiving anesthesia. Appointment details are tentative, especially check-in time. Patient will receive a prep-op call 7 days prior to confirm check-in time for procedure. If applicable the patient should contact their pharmacy to verify Rx for procedure prep is ready for pick-up. Patient was advised to call the scheduling department at [phone] if pharmacy states no Rx is available. Patient was advised to call the endoscopy scheduling department if any questions or concerns arise.       Endoscopy Scheduling Department

## 2025-03-14 NOTE — TELEPHONE ENCOUNTER
----- Message from Beata sent at 3/14/2025  8:38 AM CDT -----    ----- Message -----  From: Cornell Cobb MD  Sent: 3/12/2025   5:11 PM CDT  To: [facility] Endoscopist Clinic Patients    Procedure: EGD    Diagnosis:  Epigastric pain and GERD    Procedure Timing: Within 4 weeks (Urgent)    Location: Any Site    Additional Scheduling Information: No scheduling concerns    Prep Specifications:Standard prep    Is the patient taking a GLP-1 Agonist:no    Have you attached a patient to this message: yes

## 2025-03-17 ENCOUNTER — LAB VISIT (OUTPATIENT)
Dept: LAB | Facility: OTHER | Age: 65
End: 2025-03-17
Attending: INTERNAL MEDICINE
Payer: MEDICARE

## 2025-03-17 DIAGNOSIS — R10.13 EPIGASTRIC PAIN: ICD-10-CM

## 2025-03-17 DIAGNOSIS — E61.1 IRON DEFICIENCY: ICD-10-CM

## 2025-03-17 LAB
FERRITIN SERPL-MCNC: 110 NG/ML (ref 20–300)
HGB BLD-MCNC: 12.2 G/DL (ref 12–16)
IRON SERPL-MCNC: 118 UG/DL (ref 30–160)
LIPASE SERPL-CCNC: 5 U/L (ref 4–60)
SATURATED IRON: 32 % (ref 20–50)
TOTAL IRON BINDING CAPACITY: 373 UG/DL (ref 250–450)
TRANSFERRIN SERPL-MCNC: 252 MG/DL (ref 200–375)

## 2025-03-17 PROCEDURE — 36415 COLL VENOUS BLD VENIPUNCTURE: CPT | Performed by: INTERNAL MEDICINE

## 2025-03-17 PROCEDURE — 85018 HEMOGLOBIN: CPT | Performed by: INTERNAL MEDICINE

## 2025-03-17 PROCEDURE — 83690 ASSAY OF LIPASE: CPT | Performed by: INTERNAL MEDICINE

## 2025-03-17 PROCEDURE — 82728 ASSAY OF FERRITIN: CPT | Performed by: INTERNAL MEDICINE

## 2025-03-17 PROCEDURE — 83540 ASSAY OF IRON: CPT | Performed by: INTERNAL MEDICINE

## 2025-03-19 ENCOUNTER — RESULTS FOLLOW-UP (OUTPATIENT)
Dept: GASTROENTEROLOGY | Facility: CLINIC | Age: 65
End: 2025-03-19

## 2025-03-21 ENCOUNTER — TELEPHONE (OUTPATIENT)
Dept: ENDOSCOPY | Facility: HOSPITAL | Age: 65
End: 2025-03-21
Payer: MEDICARE

## 2025-03-21 NOTE — TELEPHONE ENCOUNTER
Confirmed EGD with patient and reviewed prep instructions. Verbalized understanding of instructions.

## 2025-03-26 ENCOUNTER — TELEPHONE (OUTPATIENT)
Dept: ENDOSCOPY | Facility: HOSPITAL | Age: 65
End: 2025-03-26
Payer: MEDICARE

## 2025-03-26 NOTE — TELEPHONE ENCOUNTER
Dear Dr Recio,    Patient:  Luciana BergerCarmenarnoldcarole         :  [birth year]    Patient has a scheduled procedure Upper Endoscopy (EGD) 3/31/25 and in order to ensure patient safety, we would like to confirm if he/she is medically optimized for the procedure.     Please fax clearance letter along with the last clinic visit and echo ASA to: [phone].       Thank you for your prompt reply.    [facility] Endoscopy Scheduling

## 2025-03-26 NOTE — TELEPHONE ENCOUNTER
----- Message from Beata sent at 3/26/2025  7:29 AM CDT -----  Regarding: FW: Consult/Advisory  Contact: [phone]    ----- Message -----  From: Shannon Rubalcava MA  Sent: 3/25/2025   1:59 PM CDT  To: [facility] Endoscopy Schedulers  Subject: FW: Consult/Advisory                             Stated in order for her cardiologist to clear her for her procedure on 3/31 they need a form sent for their office to fill out.   Amber  ----- Message -----  From: Atul Gracia  Sent: 3/25/2025   1:44 PM CDT  To: [facility] Staff  Subject: Consult/Advisory                                 Consult/Advisory Name Of Caller: pt   Contact Preference:  [phone] Nature of call: requesting prabhjot form be sent to cardio Dr. Rosalina Recio  Phone # [phone] fax # [phone] for procedure. Please call to advise thank you

## 2025-03-27 ENCOUNTER — TELEPHONE (OUTPATIENT)
Dept: ENDOSCOPY | Facility: HOSPITAL | Age: 65
End: 2025-03-27
Payer: MEDICARE

## 2025-03-27 NOTE — TELEPHONE ENCOUNTER
Referral for procedure from [facility] originally-R/S to 2nd floor      Spoke to patient to schedule procedure(s) Upper Endoscopy (EGD)       Physician to perform procedure(s) Dr. JASMINE Cobb  Date of Procedure (s) 4/4/25  Arrival Time 2:30 PM  Time of Procedure(s) 3:30 PM   Location of Procedure(s) [address]  Type of Rx Prep sent to patient: N/A  Instructions provided to patient via [facility]    Patient was informed on the following information and verbalized understanding. Screening questionnaire reviewed with patient and complete. If procedure requires anesthesia, a responsible adult needs to be present to accompany the patient home, patient cannot drive after receiving anesthesia. Appointment details are tentative, especially check-in time. Patient will receive a prep-op call 7 days prior to confirm check-in time for procedure. If applicable the patient should contact their pharmacy to verify Rx for procedure prep is ready for pick-up. Patient was advised to call the scheduling department at [phone] if pharmacy states no Rx is available. Patient was advised to call the endoscopy scheduling department if any questions or concerns arise.       Endoscopy Scheduling Department           ----- Message -----  From: Cornell Cobb MD  Sent: 3/12/2025   5:11 PM CDT  To: [facility] Endoscopist Clinic Patients     Procedure: EGD     Diagnosis:  Epigastric pain and GERD     Procedure Timing: Within 4 weeks (Urgent)     Location: Any Site     Additional Scheduling Information: No scheduling concerns     Prep Specifications:Standard prep     Is the patient taking a GLP-1 Agonist:no     Have you attached a patient to this message: yes

## 2025-04-01 ENCOUNTER — TELEPHONE (OUTPATIENT)
Dept: ENDOSCOPY | Facility: HOSPITAL | Age: 65
End: 2025-04-01
Payer: MEDICARE

## 2025-04-01 NOTE — TELEPHONE ENCOUNTER
Confirmed EGD with patient. Prep instructions reviewed in detail via phone. States will review instructions via MyChart also. Verbalized understanding of instructions.

## 2025-04-04 ENCOUNTER — ANESTHESIA EVENT (OUTPATIENT)
Dept: ENDOSCOPY | Facility: HOSPITAL | Age: 65
End: 2025-04-04
Payer: MEDICARE

## 2025-04-04 ENCOUNTER — DOCUMENTATION ONLY (OUTPATIENT)
Dept: ENDOSCOPY | Facility: HOSPITAL | Age: 65
End: 2025-04-04
Payer: MEDICARE

## 2025-04-04 ENCOUNTER — ANESTHESIA (OUTPATIENT)
Dept: ENDOSCOPY | Facility: HOSPITAL | Age: 65
End: 2025-04-04
Payer: MEDICARE

## 2025-04-04 ENCOUNTER — HOSPITAL ENCOUNTER (OUTPATIENT)
Facility: HOSPITAL | Age: 65
Discharge: HOME | End: 2025-04-04
Attending: INTERNAL MEDICINE | Admitting: INTERNAL MEDICINE
Payer: MEDICARE

## 2025-04-04 VITALS
OXYGEN SATURATION: 100 % | TEMPERATURE: 98 F | SYSTOLIC BLOOD PRESSURE: 115 MMHG | RESPIRATION RATE: 16 BRPM | HEART RATE: 60 BPM | DIASTOLIC BLOOD PRESSURE: 58 MMHG

## 2025-04-04 DIAGNOSIS — K21.9 GERD (GASTROESOPHAGEAL REFLUX DISEASE): ICD-10-CM

## 2025-04-04 PROCEDURE — 43235 EGD DIAGNOSTIC BRUSH WASH: CPT | Mod: 74 | Performed by: INTERNAL MEDICINE

## 2025-04-04 PROCEDURE — 43235 EGD DIAGNOSTIC BRUSH WASH: CPT | Mod: 52,,, | Performed by: INTERNAL MEDICINE

## 2025-04-04 PROCEDURE — 63600175 PHARM REV CODE 636 W HCPCS: Performed by: NURSE ANESTHETIST, CERTIFIED REGISTERED

## 2025-04-04 PROCEDURE — 37000009 HC ANESTHESIA EA ADD 15 MINS: Performed by: INTERNAL MEDICINE

## 2025-04-04 PROCEDURE — 37000008 HC ANESTHESIA 1ST 15 MINUTES: Performed by: INTERNAL MEDICINE

## 2025-04-04 RX ORDER — ONDANSETRON HYDROCHLORIDE 2 MG/ML
INJECTION, SOLUTION INTRAVENOUS
Status: DISCONTINUED | OUTPATIENT
Start: 2025-04-04 | End: 2025-04-04

## 2025-04-04 RX ORDER — SODIUM CHLORIDE 9 MG/ML
INJECTION, SOLUTION INTRAVENOUS CONTINUOUS
Status: DISCONTINUED | OUTPATIENT
Start: 2025-04-04 | End: 2025-04-04 | Stop reason: HOSPADM

## 2025-04-04 RX ORDER — GLUCAGON 1 MG
1 KIT INJECTION
OUTPATIENT
Start: 2025-04-04

## 2025-04-04 RX ORDER — ONDANSETRON HYDROCHLORIDE 2 MG/ML
4 INJECTION, SOLUTION INTRAVENOUS DAILY PRN
OUTPATIENT
Start: 2025-04-04

## 2025-04-04 RX ORDER — LIDOCAINE HYDROCHLORIDE 20 MG/ML
INJECTION INTRAVENOUS
Status: DISCONTINUED | OUTPATIENT
Start: 2025-04-04 | End: 2025-04-04

## 2025-04-04 RX ORDER — PROPOFOL 10 MG/ML
VIAL (ML) INTRAVENOUS CONTINUOUS PRN
Status: DISCONTINUED | OUTPATIENT
Start: 2025-04-04 | End: 2025-04-04

## 2025-04-04 RX ADMIN — ONDANSETRON 4 MG: 2 INJECTION INTRAMUSCULAR; INTRAVENOUS at 03:04

## 2025-04-04 RX ADMIN — LIDOCAINE HYDROCHLORIDE 50 MG: 20 INJECTION INTRAVENOUS at 03:04

## 2025-04-04 RX ADMIN — PROPOFOL 150 MCG/KG/MIN: 10 INJECTION, EMULSION INTRAVENOUS at 03:04

## 2025-04-04 NOTE — PROGRESS NOTES
Endoscopy schedulers referral placed.    Procedure: EGD     Diagnosis:  Iron deficiency anemia, Epigastric pain and GERD     Procedure Timing: Within 1-2  weeks (Urgent)     Location: Second floor [facility]     Additional Scheduling Information: stomach full of food on 4/4/2025     Prep Specifications:Gastroparesis extended full liquid diet.  Starting 4 days prior full liquids and day prior clear liquids.     Is the patient taking a GLP-1 Agonist:no     Have you attached a patient to this message: yes

## 2025-04-04 NOTE — H&P
[facility] - Endoscopy  History & Physical    Subjective:      Chief Complaint/Reason for Admission:     Procedure: EGD     Diagnosis:  Epigastric pain and GERD    Luciana Thurston is a 64 y.o. female.    Past Medical History:   Diagnosis Date    Anxiety     Arthritis     GERD (gastroesophageal reflux disease)     Neuromuscular disorder      Past Surgical History:   Procedure Laterality Date    COLONOSCOPY N/A 8/12/2020    Procedure: COLONOSCOPY;  Surgeon: Cornell Cobb MD;  Location: [facility] ([address] [place] [address]);  Service: Endoscopy;  Laterality: N/A;  Please order CBC day of case    ESOPHAGOGASTRODUODENOSCOPY N/A 8/12/2020    Procedure: EGD (ESOPHAGOGASTRODUODENOSCOPY);  Surgeon: Cornell Cobb MD;  Location: [facility] ([address] [place] [address]);  Service: Endoscopy;  Laterality: N/A;  covid test 8/9-[place] [facility]  labs prior    left breast surgery in 1989 Left      Social History[1]    No medications prior to admission.     Review of patient's allergies indicates:  No Known Allergies     Review of Systems   Constitutional:  Negative for fever.       Objective:      Vital Signs (Most Recent)       Vital Signs Range (Last 24H):       Physical Exam  Neurological:      Mental Status: She is alert and oriented to person, place, and time.             Assessment:    Procedure: EGD     Diagnosis:  Epigastric pain and GERD      Plan:    Procedure: EGD     Diagnosis:  Epigastric pain and GERD           [1]   Social History  Tobacco Use    Smoking status: Never    Smokeless tobacco: Never   Substance Use Topics    Alcohol use: No    Drug use: No

## 2025-04-04 NOTE — PROVATION PATIENT INSTRUCTIONS
Discharge Summary/Instructions after an Endoscopic Procedure  Patient Name: Luciana Corral  Patient MRN: [id number]  Patient   YOB: 1960  Friday, April 4, 2025  Cornell Cobb MD  Dear patient,  As a result of recent federal legislation (The Federal Cures Act), you may   receive lab or pathology results from your procedure in your [facility]   account before your physician is able to contact you. Your physician or   their representative will relay the results to you with their   recommendations at their soonest availability.  Thank you,  RESTRICTIONS:  During your procedure today, you received medications for sedation.  These   medications may affect your judgment, balance and coordination.  Therefore,   for 24 hours, you have the following restrictions:   - DO NOT drive a car, operate machinery, make legal/financial decisions,   sign important papers or drink alcohol.    ACTIVITY:  Today: no heavy lifting, straining or running due to procedural   sedation/anesthesia.  The following day: return to full activity including work.  DIET:  Eat and drink normally unless instructed otherwise.     TREATMENT FOR COMMON SIDE EFFECTS:  - Mild abdominal pain, nausea, belching, bloating or excessive gas:  rest,   eat lightly and use a heating pad.  - Sore Throat: treat with throat lozenges and/or gargle with warm salt   water.  - Because air was used during the procedure, expelling large amounts of air   from your rectum or belching is normal.  - If a bowel prep was taken, you may not have a bowel movement for 1-3 days.    This is normal.  SYMPTOMS TO WATCH FOR AND REPORT TO YOUR PHYSICIAN:  1. Abdominal pain or bloating, other than gas cramps.  2. Chest pain.  3. Back pain.  4. Signs of infection such as: chills or fever occurring within 24 hours   after the procedure.  5. Rectal bleeding, which would show as bright red, maroon, or black stools.   (A tablespoon of blood from the rectum is not serious,  especially if   hemorrhoids are present.)  6. Vomiting.  7. Weakness or dizziness.  GO DIRECTLY TO THE NEAREST EMERGENCY ROOM IF YOU HAVE ANY OF THE FOLLOWING:      Difficulty breathing              Chills and/or fever over 101 F   Persistent vomiting and/or vomiting blood   Severe abdominal pain   Severe chest pain   Black, tarry stools   Bleeding- more than one tablespoon   Any other symptom or condition that you feel may need urgent attention  Your doctor recommends these additional instructions:  If any biopsies were taken, your doctors clinic will contact you in 1 to 2   weeks with any results.  - Discharge patient to home.   - Repeat upper endoscopy at the next available appointment because the   preparation was poor. Referral placed for urgent case.  - The findings and recommendations were discussed with the patient.   - Return to GI clinic at the next available appointment.   - Return to primary care physician at the next available appointment.   - The findings and recommendations were discussed with the patient.  For questions, problems or results please call your physician - Cornell Cobb MD at Work:  [phone].  [facility], EMERGENCY ROOM PHONE NUMBER: [phone]  IF A COMPLICATION OR EMERGENCY SITUATION ARISES AND YOU ARE UNABLE TO REACH   YOUR PHYSICIAN - GO DIRECTLY TO THE EMERGENCY ROOM.  Cornell Cobb MD  4/4/2025 4:23:47 PM  This report has been verified and signed electronically.  Dear patient,  As a result of recent federal legislation (The Federal Cures Act), you may   receive lab or pathology results from your procedure in your [facility]   account before your physician is able to contact you. Your physician or   their representative will relay the results to you with their   recommendations at their soonest availability.  Thank you,  PROVATION

## 2025-04-04 NOTE — ANESTHESIA PREPROCEDURE EVALUATION
04/04/2025  Luciana Thurston is a 64 y.o., female.    Past Medical History:   Diagnosis Date    Anxiety     Arthritis     GERD (gastroesophageal reflux disease)     Neuromuscular disorder        Past Surgical History:   Procedure Laterality Date    COLONOSCOPY N/A 8/12/2020    Procedure: COLONOSCOPY;  Surgeon: Cornell Cobb MD;  Location: [facility] ([address] [place] [address]);  Service: Endoscopy;  Laterality: N/A;  Please order CBC day of case    ESOPHAGOGASTRODUODENOSCOPY N/A 8/12/2020    Procedure: EGD (ESOPHAGOGASTRODUODENOSCOPY);  Surgeon: Cornell Cobb MD;  Location: [facility] ([address] [place] [address]);  Service: Endoscopy;  Laterality: N/A;  covid test 8/9-[place] [facility]  labs prior    left breast surgery in 1989 Left            Pre-op Assessment          Review of Systems  Anesthesia Hx:  No problems with previous Anesthesia   History of prior surgery of interest to airway management or planning:          Denies Family Hx of Anesthesia complications.    Denies Personal Hx of Anesthesia complications.                    Cardiovascular:        Denies MI.        Denies Angina.       Denies PEREZ.   Echo 3/2025 in media- normal EF, mod MR, mod to severe TR, RVSP 43  Cleared by cardiologist Dr. Recio as low CV risk for this procedure                           Pulmonary:  Pulmonary Normal    Denies Asthma.    Denies Recent URI.                 Hepatic/GI:     GERD                    Physical Exam  General: Alert, Oriented and Well nourished    Airway:  Mallampati: II   Mouth Opening: Normal  TM Distance: Normal  Neck ROM: Normal ROM    Dental:    Chest/Lungs:  Normal Respiratory Rate    Heart:  Rate: Normal  Rhythm: Regular Rhythm        Anesthesia Plan  Type of Anesthesia, risks & benefits discussed:    Anesthesia Type: Gen Natural Airway, MAC  Intra-op Monitoring Plan: Standard ASA Monitors  Post Op Pain Control Plan:  multimodal analgesia and IV/PO Opioids PRN  Informed Consent: Informed consent signed with the Patient and all parties understand the risks and agree with anesthesia plan.  All questions answered.   ASA Score: 3  Day of Surgery Review of History & Physical: H&P Update referred to the surgeon/provider.    Ready For Surgery From Anesthesia Perspective.     .

## 2025-04-04 NOTE — TRANSFER OF CARE
Anesthesia Transfer of Care Note    Patient: Luciana Thurston    Procedure(s) Performed: Procedure(s) (LRB):  EGD (ESOPHAGOGASTRODUODENOSCOPY) (N/A)    Patient location: PACU    Anesthesia Type: general    Transport from OR: Transported from OR on 2-3 L/min O2 by NC with adequate spontaneous ventilation    Post pain: adequate analgesia    Post assessment: no apparent anesthetic complications and tolerated procedure well    Post vital signs: stable    Level of consciousness: awake, alert and oriented    Nausea/Vomiting: no nausea/vomiting    Complications: none    Transfer of care protocol was followed      Last vitals: Visit Vitals  /60 (BP Location: Left arm, Patient Position: Lying)   Pulse 66   Temp 36.7 °C (98.1 °F)   Resp 18   LMP 10/24/2013   SpO2 100%   Breastfeeding No

## 2025-04-07 ENCOUNTER — TELEPHONE (OUTPATIENT)
Dept: ENDOSCOPY | Facility: HOSPITAL | Age: 65
End: 2025-04-07
Payer: MEDICARE

## 2025-04-07 VITALS — WEIGHT: 99 LBS | HEIGHT: 63 IN | BODY MASS INDEX: 17.54 KG/M2

## 2025-04-07 DIAGNOSIS — R10.13 EPIGASTRIC PAIN: ICD-10-CM

## 2025-04-07 DIAGNOSIS — D50.9 IRON DEFICIENCY ANEMIA, UNSPECIFIED IRON DEFICIENCY ANEMIA TYPE: Primary | ICD-10-CM

## 2025-04-07 DIAGNOSIS — K21.9 GASTROESOPHAGEAL REFLUX DISEASE, UNSPECIFIED WHETHER ESOPHAGITIS PRESENT: ICD-10-CM

## 2025-04-07 RX ORDER — DICYCLOMINE HYDROCHLORIDE 10 MG/1
CAPSULE ORAL
Qty: 60 CAPSULE | Refills: 0 | Status: SHIPPED | OUTPATIENT
Start: 2025-04-07

## 2025-04-07 NOTE — TELEPHONE ENCOUNTER
Referral for procedure from Telephone call     Spoke to Luciana Thurston to schedule Upper Endoscopy (EGD)       Physician to perform procedure(s) Dr. JASMINE Cobb  Date of Procedure (s) 4/24/25  Arrival Time 11:30 AM  Time of Procedure(s) 12:30 PM   Location of Procedure(s) [address]  Instructions provided to patient via [facility]  Patient denies use of blood thinners, GLP-1 medications, and weight loss medications.  The following information was discussed with patient, and patient verbalized understanding:  Screening questionnaire reviewed with patient and complete. If procedure requires anesthesia, a responsible adult needs to be present to accompany the patient home. Appointment details are tentative, especially check-in time. Patient will receive a pre-op call 7 days prior to appointment to confirm check-in time for procedure. If applicable the patient should contact their pharmacy to verify Rx for procedure prep is ready for pick-up. Patient was instructed to call the scheduling department at [phone] if pharmacy states no Rx is available. Patient was also advised to call the endoscopy scheduling department if any questions or concerns arise.       Endoscopy Scheduling Department

## 2025-04-08 DIAGNOSIS — R10.9 ABDOMINAL CRAMPS: ICD-10-CM

## 2025-04-08 DIAGNOSIS — R11.0 NAUSEA: ICD-10-CM

## 2025-04-08 RX ORDER — DICYCLOMINE HYDROCHLORIDE 20 MG/1
20 TABLET ORAL EVERY 12 HOURS PRN
Qty: 60 TABLET | Refills: 1 | Status: SHIPPED | OUTPATIENT
Start: 2025-04-08

## 2025-04-08 RX ORDER — ONDANSETRON 4 MG/1
4 TABLET, ORALLY DISINTEGRATING ORAL EVERY 12 HOURS PRN
Qty: 60 TABLET | Refills: 1 | Status: SHIPPED | OUTPATIENT
Start: 2025-04-08

## 2025-04-24 ENCOUNTER — ANESTHESIA EVENT (OUTPATIENT)
Dept: ENDOSCOPY | Facility: HOSPITAL | Age: 65
End: 2025-04-24
Payer: MEDICARE

## 2025-04-24 ENCOUNTER — TELEPHONE (OUTPATIENT)
Dept: ENDOSCOPY | Facility: HOSPITAL | Age: 65
End: 2025-04-24
Payer: MEDICARE

## 2025-04-24 ENCOUNTER — HOSPITAL ENCOUNTER (OUTPATIENT)
Facility: HOSPITAL | Age: 65
Discharge: HOME | End: 2025-04-24
Attending: INTERNAL MEDICINE | Admitting: INTERNAL MEDICINE
Payer: MEDICARE

## 2025-04-24 ENCOUNTER — ANESTHESIA (OUTPATIENT)
Dept: ENDOSCOPY | Facility: HOSPITAL | Age: 65
End: 2025-04-24
Payer: MEDICARE

## 2025-04-24 VITALS
RESPIRATION RATE: 18 BRPM | OXYGEN SATURATION: 99 % | BODY MASS INDEX: 17.54 KG/M2 | TEMPERATURE: 98 F | HEIGHT: 63 IN | DIASTOLIC BLOOD PRESSURE: 52 MMHG | SYSTOLIC BLOOD PRESSURE: 103 MMHG | HEART RATE: 70 BPM | WEIGHT: 99 LBS

## 2025-04-24 VITALS — HEART RATE: 69 BPM

## 2025-04-24 DIAGNOSIS — D50.9 IRON DEFICIENCY ANEMIA, UNSPECIFIED IRON DEFICIENCY ANEMIA TYPE: ICD-10-CM

## 2025-04-24 DIAGNOSIS — K27.9 PUD (PEPTIC ULCER DISEASE): Primary | ICD-10-CM

## 2025-04-24 DIAGNOSIS — R10.13 EPIGASTRIC PAIN: ICD-10-CM

## 2025-04-24 DIAGNOSIS — K21.9 GERD (GASTROESOPHAGEAL REFLUX DISEASE): ICD-10-CM

## 2025-04-24 DIAGNOSIS — K21.9 GASTROESOPHAGEAL REFLUX DISEASE, UNSPECIFIED WHETHER ESOPHAGITIS PRESENT: ICD-10-CM

## 2025-04-24 PROCEDURE — 27201012 HC FORCEPS, HOT/COLD, DISP: Performed by: INTERNAL MEDICINE

## 2025-04-24 PROCEDURE — 37000008 HC ANESTHESIA 1ST 15 MINUTES: Performed by: INTERNAL MEDICINE

## 2025-04-24 PROCEDURE — 43239 EGD BIOPSY SINGLE/MULTIPLE: CPT | Performed by: INTERNAL MEDICINE

## 2025-04-24 PROCEDURE — 63600175 PHARM REV CODE 636 W HCPCS: Performed by: NURSE ANESTHETIST, CERTIFIED REGISTERED

## 2025-04-24 PROCEDURE — 25000003 PHARM REV CODE 250: Performed by: NURSE ANESTHETIST, CERTIFIED REGISTERED

## 2025-04-24 PROCEDURE — 43239 EGD BIOPSY SINGLE/MULTIPLE: CPT | Mod: ,,, | Performed by: INTERNAL MEDICINE

## 2025-04-24 PROCEDURE — 88305 TISSUE EXAM BY PATHOLOGIST: CPT | Mod: TC | Performed by: INTERNAL MEDICINE

## 2025-04-24 PROCEDURE — 37000009 HC ANESTHESIA EA ADD 15 MINS: Performed by: INTERNAL MEDICINE

## 2025-04-24 RX ORDER — LIDOCAINE HYDROCHLORIDE 20 MG/ML
INJECTION INTRAVENOUS
Status: DISCONTINUED | OUTPATIENT
Start: 2025-04-24 | End: 2025-04-24

## 2025-04-24 RX ORDER — RABEPRAZOLE SODIUM 20 MG/1
20 TABLET, DELAYED RELEASE ORAL EVERY 12 HOURS
Qty: 180 TABLET | Refills: 3 | Status: SHIPPED | OUTPATIENT
Start: 2025-04-24 | End: 2026-04-24

## 2025-04-24 RX ORDER — SODIUM CHLORIDE 0.9 % (FLUSH) 0.9 %
10 SYRINGE (ML) INJECTION
Status: DISCONTINUED | OUTPATIENT
Start: 2025-04-24 | End: 2025-04-24 | Stop reason: HOSPADM

## 2025-04-24 RX ORDER — SODIUM CHLORIDE 9 MG/ML
INJECTION, SOLUTION INTRAVENOUS CONTINUOUS
Status: DISCONTINUED | OUTPATIENT
Start: 2025-04-24 | End: 2025-04-24 | Stop reason: HOSPADM

## 2025-04-24 RX ORDER — HALOPERIDOL LACTATE 5 MG/ML
0.5 INJECTION, SOLUTION INTRAMUSCULAR EVERY 10 MIN PRN
Status: DISCONTINUED | OUTPATIENT
Start: 2025-04-24 | End: 2025-04-24 | Stop reason: HOSPADM

## 2025-04-24 RX ORDER — PROPOFOL 10 MG/ML
INJECTION, EMULSION INTRAVENOUS
Status: DISCONTINUED | OUTPATIENT
Start: 2025-04-24 | End: 2025-04-24

## 2025-04-24 RX ORDER — GLUCAGON 1 MG
1 KIT INJECTION
Status: DISCONTINUED | OUTPATIENT
Start: 2025-04-24 | End: 2025-04-24 | Stop reason: HOSPADM

## 2025-04-24 RX ADMIN — PROPOFOL 100 MG: 10 INJECTION, EMULSION INTRAVENOUS at 12:04

## 2025-04-24 RX ADMIN — SODIUM CHLORIDE: 0.9 INJECTION, SOLUTION INTRAVENOUS at 12:04

## 2025-04-24 RX ADMIN — LIDOCAINE HYDROCHLORIDE 50 MG: 20 INJECTION INTRAVENOUS at 12:04

## 2025-04-24 RX ADMIN — PROPOFOL 170 MCG/KG/MIN: 10 INJECTION, EMULSION INTRAVENOUS at 12:04

## 2025-04-24 NOTE — H&P
[facility] - [facility]oscopy  History & Physical    Subjective:      Chief Complaint/Reason for Admission:    Procedure: EGD     Diagnosis:  Epigastric pain and GERD    Luciana Thurston is a 64 y.o. female.    Past Medical History:   Diagnosis Date    Anxiety     Arthritis     GERD (gastroesophageal reflux disease)     Neuromuscular disorder      Past Surgical History:   Procedure Laterality Date    COLONOSCOPY N/A 8/12/2020    Procedure: COLONOSCOPY;  Surgeon: Cornell Cobb MD;  Location: [facility] (4TH FLR);  Service: Endoscopy;  Laterality: N/A;  Please order CBC day of case    ESOPHAGOGASTRODUODENOSCOPY N/A 8/12/2020    Procedure: EGD (ESOPHAGOGASTRODUODENOSCOPY);  Surgeon: Cornell Cobb MD;  Location: [facility] (4TH FLR);  Service: Endoscopy;  Laterality: N/A;  covid test 8/9-[place] [facility]  labs prior    ESOPHAGOGASTRODUODENOSCOPY N/A 4/4/2025    Procedure: EGD (ESOPHAGOGASTRODUODENOSCOPY);  Surgeon: Cornell Cobb MD;  Location: [facility] (2ND FLR);  Service: Endoscopy;  Laterality: N/A;  nelda/judy /prep inst sent to pt via portal  3/21: precall complete- MAC  3/27-R/S-needs 2nd floor-severe valve disease-cardiology clearance with echo from Dr Recio scanned into chart-GT  3/27 precall attempted/LVM/mleone  4/1: precall complete- MAC    left breast surgery in 1989 Left      Social History[1]    PTA Medications   Medication Sig    cholecalciferol, vitamin D3, (VITAMIN D3) 50 mcg (2,000 unit) Cap capsule Take 2 capsules (4,000 Units total) by mouth once daily.    cyanocobalamin 1,000 mcg/mL injection INJECT 1 ML INTO THE SKIN ONCE A WEEK    dicyclomine (BENTYL) 10 MG capsule TAKE 1 CAPSULE(10 MG) BY MOUTH EVERY 12 HOURS AS NEEDED FOR ABDOMINAL CRAMPS    dicyclomine (BENTYL) 20 mg tablet Take 1 tablet (20 mg total) by mouth every 12 (twelve) hours as needed (Abdominal cramps).    fluticasone propionate (FLONASE) 50 mcg/actuation nasal spray 1 spray by Each Nostril route as needed.     hydroxychloroquine (PLAQUENIL) 200 mg tablet Take 200 mg by mouth once daily.    lidocaine HCl 2% (XYLOCAINE) 2 % Soln GARGLE AND SPIT 1 TEASPOON EVERY 6 HOURS AS NEEDED FOR SORE THROAT    LORazepam (ATIVAN) 1 MG tablet Take 1 mg by mouth 2 (two) times daily.    methocarbamol (ROBAXIN) 500 MG Tab Take 500 mg by mouth 3 (three) times daily as needed.    neomycin-polymyxin-dexamethasone (MAXITROL) 3.5mg/mL-10,000 unit/mL-0.1 % DrpS Place 1 drop into both eyes.    ondansetron (ZOFRAN-ODT) 4 MG TbDL Take 1 tablet (4 mg total) by mouth every 12 (twelve) hours as needed (Nausea).    oxycodone-acetaminophen (PERCOCET) 5-325 mg per tablet Take 1 tablet by mouth every 4 (four) hours as needed for Pain.    RABEprazole (ACIPHEX) 20 mg tablet Take 1 tablet (20 mg total) by mouth before breakfast. Best taken 45-60 minutes before your 1st protein meal of the day breakfast    sotalol (BETAPACE) 80 MG tablet Take 80 mg by mouth 2 (two) times daily.    zolpidem (AMBIEN) 10 mg Tab Take 10 mg by mouth nightly as needed.     Review of patient's allergies indicates:  No Known Allergies     Review of Systems   Constitutional:  Positive for fever.       Objective:      Vital Signs (Most Recent)       Vital Signs Range (Last 24H):       Physical Exam  Neurological:      Mental Status: She is alert and oriented to person, place, and time.           Assessment:      Procedure: EGD     Diagnosis:  Epigastric pain and GERD      Plan:      Procedure: EGD     Diagnosis:  Epigastric pain and GERD         [1]   Social History  Tobacco Use    Smoking status: Never    Smokeless tobacco: Never   Substance Use Topics    Alcohol use: No    Drug use: No

## 2025-04-24 NOTE — TRANSFER OF CARE
"Anesthesia Transfer of Care Note    Patient: Luciana Thurston    Procedure(s) Performed: Procedure(s) (LRB):  EGD (ESOPHAGOGASTRODUODENOSCOPY) (N/A)    Patient location: [facility]    Anesthesia Type: general    Transport from OR: Transported from OR on room air with adequate spontaneous ventilation    Post pain: adequate analgesia    Post assessment: no apparent anesthetic complications and tolerated procedure well    Post vital signs: stable    Level of consciousness: awake and alert    Nausea/Vomiting: no nausea/vomiting    Complications: none    Transfer of care protocol was followed    Last vitals: Visit Vitals  BP (!) 107/52   Pulse 74   Temp 36.8 °C (98.2 °F) (Temporal)   Resp (!) 47   Ht 5' 3" (1.6 m)   Wt 44.9 kg (99 lb)   LMP 10/24/2013   SpO2 100%   Breastfeeding No   BMI 17.54 kg/m²     "

## 2025-04-24 NOTE — ANESTHESIA PREPROCEDURE EVALUATION
04/24/2025  Luciana Thurston is a 64 y.o., female with lupus, GERD, malnutrition here for EGD.     Past Medical History:   Diagnosis Date    Anxiety     Arthritis     GERD (gastroesophageal reflux disease)     Neuromuscular disorder        Past Surgical History:   Procedure Laterality Date    COLONOSCOPY N/A 8/12/2020    Procedure: COLONOSCOPY;  Surgeon: Cornell Cobb MD;  Location: [facility] (4TH FLR);  Service: Endoscopy;  Laterality: N/A;  Please order CBC day of case    ESOPHAGOGASTRODUODENOSCOPY N/A 8/12/2020    Procedure: EGD (ESOPHAGOGASTRODUODENOSCOPY);  Surgeon: Cornell Cobb MD;  Location: [facility] (4TH FLR);  Service: Endoscopy;  Laterality: N/A;  covid test 8/9-[place] [facility]  labs prior    ESOPHAGOGASTRODUODENOSCOPY N/A 4/4/2025    Procedure: EGD (ESOPHAGOGASTRODUODENOSCOPY);  Surgeon: Cornell Cobb MD;  Location: [facility] (2ND FLR);  Service: Endoscopy;  Laterality: N/A;  enlda/judy /prep inst sent to pt via portal  3/21: precall complete- MAC  3/27-R/S-needs 2nd floor-severe valve disease-cardiology clearance with echo from Dr Recio scanned into chart-GT  3/27 precall attempted/LVM/mleone  4/1: precall complete- MAC    left breast surgery in 1989 Left            Pre-op Assessment          Review of Systems  Anesthesia Hx:  No problems with previous Anesthesia   History of prior surgery of interest to airway management or planning:          Denies Family Hx of Anesthesia complications.    Denies Personal Hx of Anesthesia complications.                    Cardiovascular:        Denies MI.        Denies Angina.       Denies PERZE.   Echo 3/2025 in media- normal EF, mod MR, mod to severe TR, RVSP 43  Cleared by cardiologist Dr. Recio as low CV risk for this procedure                           Pulmonary:  Pulmonary Normal    Denies Asthma.    Denies Recent URI.                  Hepatic/GI:     GERD                    Physical Exam  General: Alert, Oriented and Well nourished    Airway:  Mallampati: II   Mouth Opening: Normal  TM Distance: Normal  Neck ROM: Normal ROM    Dental:    Chest/Lungs:  Normal Respiratory Rate    Heart:  Rate: Normal  Rhythm: Regular Rhythm        Anesthesia Plan  Type of Anesthesia, risks & benefits discussed:    Anesthesia Type: Gen Natural Airway, MAC  Intra-op Monitoring Plan: Standard ASA Monitors  Post Op Pain Control Plan: multimodal analgesia and IV/PO Opioids PRN  Informed Consent: Informed consent signed with the Patient and all parties understand the risks and agree with anesthesia plan.  All questions answered.   ASA Score: 3  Day of Surgery Review of History & Physical: H&P Update referred to the surgeon/provider.    Ready For Surgery From Anesthesia Perspective.     .

## 2025-04-24 NOTE — PROVATION PATIENT INSTRUCTIONS
Discharge Summary/Instructions after an Endoscopic Procedure  Patient Name: Luciana Corral  Patient MRN: [id number]  Patient   YOB: 1960  Thursday, April 24, 2025  Cornell Cobb MD  Dear patient,  As a result of recent federal legislation (The Federal Cures Act), you may   receive lab or pathology results from your procedure in your [facility]   account before your physician is able to contact you. Your physician or   their representative will relay the results to you with their   recommendations at their soonest availability.  Thank you,  RESTRICTIONS:  During your procedure today, you received medications for sedation.  These   medications may affect your judgment, balance and coordination.  Therefore,   for 24 hours, you have the following restrictions:   - DO NOT drive a car, operate machinery, make legal/financial decisions,   sign important papers or drink alcohol.    ACTIVITY:  Today: no heavy lifting, straining or running due to procedural   sedation/anesthesia.  The following day: return to full activity including work.  DIET:  Eat and drink normally unless instructed otherwise.     TREATMENT FOR COMMON SIDE EFFECTS:  - Mild abdominal pain, nausea, belching, bloating or excessive gas:  rest,   eat lightly and use a heating pad.  - Sore Throat: treat with throat lozenges and/or gargle with warm salt   water.  - Because air was used during the procedure, expelling large amounts of air   from your rectum or belching is normal.  - If a bowel prep was taken, you may not have a bowel movement for 1-3 days.    This is normal.  SYMPTOMS TO WATCH FOR AND REPORT TO YOUR PHYSICIAN:  1. Abdominal pain or bloating, other than gas cramps.  2. Chest pain.  3. Back pain.  4. Signs of infection such as: chills or fever occurring within 24 hours   after the procedure.  5. Rectal bleeding, which would show as bright red, maroon, or black stools.   (A tablespoon of blood from the rectum is not serious,  especially if   hemorrhoids are present.)  6. Vomiting.  7. Weakness or dizziness.  GO DIRECTLY TO THE NEAREST EMERGENCY ROOM IF YOU HAVE ANY OF THE FOLLOWING:      Difficulty breathing              Chills and/or fever over 101 F   Persistent vomiting and/or vomiting blood   Severe abdominal pain   Severe chest pain   Black, tarry stools   Bleeding- more than one tablespoon   Any other symptom or condition that you feel may need urgent attention  Your doctor recommends these additional instructions:  If any biopsies were taken, your doctors clinic will contact you in 1 to 2   weeks with any results.  - Discharge patient to home.   - Use Aciphex (rabeprazole) 20 mg by mouth every 12 hours best taken 45-60   minutes before breakfast and 45-60 minutes before dinner. New Rx sent.  - Follow an antireflux regimen indefinitely.   - Await pathology results.   - Telephone endoscopist for pathology results in 3 weeks.   - Repeat upper endoscopy in 8 weeks to check healing. Referral placed.  - Consider avoiding all non-steroidal anti-inflammatory drugs (aspirin,   ibuprofen, naproxen, etc.), unless needed for cardiovascular protection.    Recommend you discuss with your prescribing doctor (of your aspirin) to see   if cardiovascular benefits of your aspirin outweigh the risks of GI   bleeding. IT'S OK TO STAY ON ASPIRIN IF YOUR ASPIRIN IS NEEDED FOR   CARDIOVASCULAR PROTECTION.  - Return to primary care physician.   - Return to GI clinic at the next available appointment.   - The findings and recommendations were discussed with the patient.  For questions, problems or results please call your physician - Cornell Cobb MD at Work:  [phone].  [facility] [place], EMERGENCY ROOM PHONE NUMBER: [phone]  IF A COMPLICATION OR EMERGENCY SITUATION ARISES AND YOU ARE UNABLE TO REACH   YOUR PHYSICIAN - GO DIRECTLY TO THE EMERGENCY ROOM.  Cornell Cobb MD  4/24/2025 12:56:50 PM  This report has been verified and  signed electronically.  Dear patient,  As a result of recent federal legislation (The Federal Cures Act), you may   receive lab or pathology results from your procedure in your [facility]   account before your physician is able to contact you. Your physician or   their representative will relay the results to you with their   recommendations at their soonest availability.  Thank you,  PROVATION

## 2025-04-24 NOTE — PLAN OF CARE
Pt Aox4. VSS. No signs of distress. Patient educated and given discharge instructions at bedside. Dr. Cobb spoke to pt at bedside. All questions answered. IV removed. Pt ready for discharge.

## 2025-04-24 NOTE — ANESTHESIA POSTPROCEDURE EVALUATION
Anesthesia Post Evaluation    Patient: Luciana Thurston    Procedure(s) Performed: Procedure(s) (LRB):  EGD (ESOPHAGOGASTRODUODENOSCOPY) (N/A)    Final Anesthesia Type: general      Patient location during evaluation: [facility]  Patient participation: Yes- Able to Participate  Level of consciousness: awake and alert  Post-procedure vital signs: reviewed and stable  Pain management: adequate  Airway patency: patent    PONV status at discharge: No PONV  Anesthetic complications: no      Cardiovascular status: hemodynamically stable  Respiratory status: unassisted and spontaneous ventilation  Hydration status: euvolemic  Follow-up not needed.              Vitals Value Taken Time   /52 04/24/25 14:15   Temp 36.7 °C (98.1 °F) 04/24/25 14:15   Pulse 70 04/24/25 14:15   Resp 18 04/24/25 14:15   SpO2 99 % 04/24/25 14:15         No case tracking events are documented in the log.      Pain/Cecil Score: Cecil Score: 10 (4/24/2025  2:00 PM)

## 2025-04-29 ENCOUNTER — TELEPHONE (OUTPATIENT)
Dept: RHEUMATOLOGY | Facility: CLINIC | Age: 65
End: 2025-04-29
Payer: MEDICARE

## 2025-05-01 LAB
ESTRIOL SERPL-MCNC: NORMAL NG/ML
ESTROGEN SERPL-MCNC: NORMAL PG/ML
INSULIN SERPL-ACNC: NORMAL U[IU]/ML
LAB AP CLINICAL INFORMATION: NORMAL
LAB AP GROSS DESCRIPTION: NORMAL
LAB AP PERFORMING LOCATION(S): NORMAL
LAB AP REPORT FOOTNOTES: NORMAL

## 2025-05-14 ENCOUNTER — TELEPHONE (OUTPATIENT)
Dept: RHEUMATOLOGY | Facility: CLINIC | Age: 65
End: 2025-05-14
Payer: MEDICARE

## 2025-05-14 RX ORDER — DICYCLOMINE HYDROCHLORIDE 10 MG/1
10 CAPSULE ORAL EVERY 12 HOURS PRN
Qty: 60 CAPSULE | Refills: 0 | Status: SHIPPED | OUTPATIENT
Start: 2025-05-14

## 2025-05-14 NOTE — TELEPHONE ENCOUNTER
----- Message from Lashay sent at 5/14/2025 12:50 PM CDT -----  Type:  Patient Returning CallWho Called:ptWho Left Message for Patient:Tammi Pagan MADoes the patient know what this is regarding?:appointment Would the patient rather a call back or a response via [facility]? Call Best Call Back Number:[phone] Information:

## 2025-05-14 NOTE — TELEPHONE ENCOUNTER
Returned patients call. NA/LM for her to return my call.    ----- Message from Lashay sent at 5/14/2025 12:50 PM CDT -----  Type:  Patient Returning CallWho Called:ptWho Left Message for Patient:Tammi Pagan MADoes the patient know what this is regarding?:appointment Would the patient rather a call back or a response via [facility]? Call Best Call Back Number:[phone] Information:

## 2025-05-18 ENCOUNTER — PATIENT MESSAGE (OUTPATIENT)
Dept: GASTROENTEROLOGY | Facility: CLINIC | Age: 65
End: 2025-05-18
Payer: MEDICARE

## 2025-05-18 ENCOUNTER — RESULTS FOLLOW-UP (OUTPATIENT)
Dept: GASTROENTEROLOGY | Facility: CLINIC | Age: 65
End: 2025-05-18

## 2025-05-18 NOTE — PROGRESS NOTES
Luciana very important that you schedule your follow-up EGD 8 weeks from your last 1 to check for your gastric ulcer healing.    Your EGD pathology was benign    Supplemental Report   Part 2: Helicobacter pylori immunostain is negative.     IHC controls were reviewed and were adequate.    Addendum electronically signed by Christiano Elizabeth MD on 5/1/2025 at 2:45 PM    Final Diagnosis   1. Duodenum, biopsy:  Duodenal mucosa with no diagnostic abnormality.  Villous architecture is preserved with no intraepithelial lymphocytosis.     2. Stomach, biopsy:  Gastric antral and oxyntic mucosa with mild chronic inactive gastritis.  Helicobacter pylori immunostain is pending and will be reported in an addendum.   Electronically signed by Christiano Elizabeth MD on 4/28/2025

## 2025-06-05 DIAGNOSIS — R11.0 NAUSEA: ICD-10-CM

## 2025-06-05 RX ORDER — ONDANSETRON 4 MG/1
4 TABLET, ORALLY DISINTEGRATING ORAL EVERY 12 HOURS PRN
Qty: 60 TABLET | Refills: 1 | Status: SHIPPED | OUTPATIENT
Start: 2025-06-05

## 2025-06-09 ENCOUNTER — PATIENT MESSAGE (OUTPATIENT)
Dept: GASTROENTEROLOGY | Facility: CLINIC | Age: 65
End: 2025-06-09
Payer: MEDICARE

## 2025-06-09 DIAGNOSIS — R10.9 ABDOMINAL CRAMPS: Primary | ICD-10-CM

## 2025-06-09 RX ORDER — DICYCLOMINE HYDROCHLORIDE 10 MG/1
10 CAPSULE ORAL EVERY 12 HOURS PRN
Qty: 90 CAPSULE | Refills: 0 | Status: SHIPPED | OUTPATIENT
Start: 2025-06-09

## 2025-06-12 ENCOUNTER — TELEPHONE (OUTPATIENT)
Dept: GASTROENTEROLOGY | Facility: CLINIC | Age: 65
End: 2025-06-12
Payer: MEDICARE

## 2025-06-12 NOTE — TELEPHONE ENCOUNTER
Copied from CRM #[id number]. Topic: Medications - Medication Refill  >> Jun 12, 2025  4:08 PM Samira wrote:  .Type:  RX Refill Request    Who Called: pt   Refill or New Rx:refill  RX Name and Strength:needs the dicyclomine tablets not just the capsule , 20 mg  How is the patient currently taking it? (ex. 1XDay):in chart   Is this a 30 day or 90 day RX:in chart   Preferred Pharmacy with phone number:..  [id number] [address] [address]  Phone: [phone] Fax: [phone]    Local or Mail Order:local   Ordering Provider:this one   Would the patient rather a call back or a response via [facility]? Call   Best Call Back Number:[phone]

## 2025-06-26 ENCOUNTER — TELEPHONE (OUTPATIENT)
Dept: ENDOSCOPY | Facility: HOSPITAL | Age: 65
End: 2025-06-26
Payer: MEDICARE

## 2025-06-26 VITALS — HEIGHT: 63 IN | WEIGHT: 99 LBS | BODY MASS INDEX: 17.54 KG/M2

## 2025-06-26 DIAGNOSIS — K27.9 PUD (PEPTIC ULCER DISEASE): Primary | ICD-10-CM

## 2025-06-26 NOTE — TELEPHONE ENCOUNTER
"----- Message from Beata sent at 2025  4:16 PM CDT -----    ----- Message -----  From: Cornell Cobb MD  Sent: 2025  12:49 PM CDT  To: [facility]    Procedure: EGD    Diagnosis: Peptic ulcer disease    Procedure Timin-12 weeks    #If within 4 weeks selected, please vilma as high priority#    #If greater than 12 weeks, please select "5-12 weeks" and delay sending until 3 months prior to requested date#     Location: Hospital Based ([facility]-Endo,  endo, [facility])    Additional Scheduling Information: second floor due prior stomach full of food having to abort a 4th floor EGD case recently.  Patient should be on he Aciphex 20mg every 12 hours since last EGD which was done on 2025    Prep Specifications:Gastroparesis (Extended clear liquid)    Is the patient taking a GLP-1 Agonist:no but please ask    Have you attached a patient to this message: yes  "

## 2025-06-26 NOTE — TELEPHONE ENCOUNTER
Patient is scheduled for a Upper Endoscopy (EGD) on 8/22/25 with Dr. JASMINE Cobb  Referral for procedure from [facility]

## 2025-08-18 ENCOUNTER — PATIENT MESSAGE (OUTPATIENT)
Dept: ENDOSCOPY | Facility: HOSPITAL | Age: 65
End: 2025-08-18
Payer: MEDICARE

## 2025-08-20 ENCOUNTER — TELEPHONE (OUTPATIENT)
Dept: GASTROENTEROLOGY | Facility: CLINIC | Age: 65
End: 2025-08-20
Payer: MEDICARE

## 2025-08-21 ENCOUNTER — TELEPHONE (OUTPATIENT)
Dept: GASTROENTEROLOGY | Facility: CLINIC | Age: 65
End: 2025-08-21
Payer: MEDICARE

## 2025-08-21 DIAGNOSIS — R10.9 ABDOMINAL CRAMPS: ICD-10-CM

## 2025-08-21 RX ORDER — DICYCLOMINE HYDROCHLORIDE 10 MG/1
10 CAPSULE ORAL EVERY 12 HOURS PRN
Qty: 60 CAPSULE | Refills: 0 | Status: SHIPPED | OUTPATIENT
Start: 2025-08-21

## 2025-08-22 ENCOUNTER — TELEPHONE (OUTPATIENT)
Dept: ENDOSCOPY | Facility: HOSPITAL | Age: 65
End: 2025-08-22
Payer: MEDICARE

## 2025-08-22 DIAGNOSIS — K27.9 PUD (PEPTIC ULCER DISEASE): Primary | ICD-10-CM
